# Patient Record
Sex: MALE | Race: WHITE | NOT HISPANIC OR LATINO
[De-identification: names, ages, dates, MRNs, and addresses within clinical notes are randomized per-mention and may not be internally consistent; named-entity substitution may affect disease eponyms.]

---

## 2017-03-07 ENCOUNTER — APPOINTMENT (OUTPATIENT)
Dept: INTERNAL MEDICINE | Facility: CLINIC | Age: 67
End: 2017-03-07

## 2017-03-15 ENCOUNTER — APPOINTMENT (OUTPATIENT)
Dept: INTERNAL MEDICINE | Facility: CLINIC | Age: 67
End: 2017-03-15

## 2017-03-15 ENCOUNTER — LABORATORY RESULT (OUTPATIENT)
Age: 67
End: 2017-03-15

## 2017-03-15 VITALS
WEIGHT: 170 LBS | DIASTOLIC BLOOD PRESSURE: 80 MMHG | SYSTOLIC BLOOD PRESSURE: 130 MMHG | RESPIRATION RATE: 14 BRPM | BODY MASS INDEX: 27.32 KG/M2 | OXYGEN SATURATION: 95 % | HEART RATE: 80 BPM | HEIGHT: 66 IN | TEMPERATURE: 98.5 F

## 2017-03-15 DIAGNOSIS — M54.2 CERVICALGIA: ICD-10-CM

## 2017-03-15 DIAGNOSIS — I26.99 OTHER PULMONARY EMBOLISM W/OUT ACUTE COR PULMONALE: ICD-10-CM

## 2017-03-16 LAB
ALBUMIN SERPL ELPH-MCNC: 4.1 G/DL
ALP BLD-CCNC: 30 U/L
ALT SERPL-CCNC: 29 U/L
ANION GAP SERPL CALC-SCNC: 17 MMOL/L
AST SERPL-CCNC: 17 U/L
BASOPHILS # BLD AUTO: 0 K/UL
BASOPHILS NFR BLD AUTO: 0 %
BILIRUB SERPL-MCNC: 0.4 MG/DL
BUN SERPL-MCNC: 25 MG/DL
CALCIUM SERPL-MCNC: 8.9 MG/DL
CHLORIDE SERPL-SCNC: 98 MMOL/L
CHOLEST SERPL-MCNC: 327 MG/DL
CHOLEST/HDLC SERPL: 4.1 RATIO
CO2 SERPL-SCNC: 24 MMOL/L
CREAT SERPL-MCNC: 0.71 MG/DL
EOSINOPHIL # BLD AUTO: 0 K/UL
EOSINOPHIL NFR BLD AUTO: 0 %
GLUCOSE SERPL-MCNC: 80 MG/DL
HBA1C MFR BLD HPLC: 6.4 %
HCT VFR BLD CALC: 49.9 %
HDLC SERPL-MCNC: 79 MG/DL
HGB BLD-MCNC: 16.1 G/DL
LDLC SERPL CALC-MCNC: 205 MG/DL
LYMPHOCYTES # BLD AUTO: 3.46 K/UL
LYMPHOCYTES NFR BLD AUTO: 22.7 %
MAN DIFF?: NORMAL
MCHC RBC-ENTMCNC: 31.6 PG
MCHC RBC-ENTMCNC: 32.3 GM/DL
MCV RBC AUTO: 97.8 FL
MONOCYTES # BLD AUTO: 2.91 K/UL
MONOCYTES NFR BLD AUTO: 19.1 %
NEUTROPHILS # BLD AUTO: 8.18 K/UL
NEUTROPHILS NFR BLD AUTO: 53.7 %
PLATELET # BLD AUTO: 205 K/UL
POTASSIUM SERPL-SCNC: 3.6 MMOL/L
PROT SERPL-MCNC: 6.2 G/DL
PSA FREE FLD-MCNC: 6.3 %
PSA FREE SERPL-MCNC: 2.81 NG/ML
PSA SERPL-MCNC: 44.38 NG/ML
RBC # BLD: 5.1 M/UL
RBC # FLD: 14.3 %
SODIUM SERPL-SCNC: 139 MMOL/L
TRIGL SERPL-MCNC: 214 MG/DL
WBC # FLD AUTO: 15.23 K/UL

## 2017-04-26 ENCOUNTER — APPOINTMENT (OUTPATIENT)
Dept: UROLOGY | Facility: CLINIC | Age: 67
End: 2017-04-26

## 2017-05-15 ENCOUNTER — APPOINTMENT (OUTPATIENT)
Dept: INTERNAL MEDICINE | Facility: CLINIC | Age: 67
End: 2017-05-15

## 2017-05-19 ENCOUNTER — EMERGENCY (EMERGENCY)
Facility: HOSPITAL | Age: 67
LOS: 1 days | Discharge: HOME | End: 2017-05-19
Attending: INTERNAL MEDICINE | Admitting: INTERNAL MEDICINE
Payer: MEDICARE

## 2017-05-19 VITALS
HEART RATE: 114 BPM | RESPIRATION RATE: 16 BRPM | HEIGHT: 66 IN | DIASTOLIC BLOOD PRESSURE: 82 MMHG | OXYGEN SATURATION: 96 % | SYSTOLIC BLOOD PRESSURE: 122 MMHG | TEMPERATURE: 99 F | WEIGHT: 169.98 LBS

## 2017-05-19 VITALS
DIASTOLIC BLOOD PRESSURE: 80 MMHG | HEART RATE: 106 BPM | SYSTOLIC BLOOD PRESSURE: 125 MMHG | OXYGEN SATURATION: 97 % | TEMPERATURE: 99 F | RESPIRATION RATE: 16 BRPM

## 2017-05-19 PROCEDURE — 99282 EMERGENCY DEPT VISIT SF MDM: CPT

## 2017-05-19 PROCEDURE — 99284 EMERGENCY DEPT VISIT MOD MDM: CPT

## 2017-05-19 NOTE — ED PROVIDER NOTE - PHYSICAL EXAMINATION
left lower medial buttock area with slightly tender and erythematous,non fluctuant site...incision and drainage done but absolutely no pus noted,only bleeding from incision via #11 blade.packed to control bleeding.

## 2017-05-19 NOTE — ED PROVIDER NOTE - OBJECTIVE STATEMENT
66 yo wm,very poor historian,c/o 1 week of pain ? left buttock. feels this is similar to instance in past.denies fever or dm.wants "drain".doesnt know if he has had mrsa in past instance.allergic to pcn and sulfa.

## 2017-05-19 NOTE — ED ADULT NURSE NOTE - OBJECTIVE STATEMENT
Patient walked into ER c/o pain to left buttock for a week.  area very red looking and painful to touch.

## 2017-09-05 ENCOUNTER — EMERGENCY (EMERGENCY)
Facility: HOSPITAL | Age: 67
LOS: 1 days | Discharge: HOME | End: 2017-09-05
Attending: INTERNAL MEDICINE | Admitting: INTERNAL MEDICINE
Payer: MEDICARE

## 2017-09-05 VITALS
DIASTOLIC BLOOD PRESSURE: 78 MMHG | OXYGEN SATURATION: 98 % | SYSTOLIC BLOOD PRESSURE: 133 MMHG | WEIGHT: 160.06 LBS | HEART RATE: 109 BPM | TEMPERATURE: 98 F | HEIGHT: 66 IN

## 2017-09-05 VITALS
RESPIRATION RATE: 16 BRPM | OXYGEN SATURATION: 98 % | DIASTOLIC BLOOD PRESSURE: 76 MMHG | HEART RATE: 92 BPM | SYSTOLIC BLOOD PRESSURE: 130 MMHG

## 2017-09-05 PROCEDURE — 10060 I&D ABSCESS SIMPLE/SINGLE: CPT

## 2017-09-05 PROCEDURE — 99283 EMERGENCY DEPT VISIT LOW MDM: CPT | Mod: 25

## 2017-09-05 NOTE — ED PROVIDER NOTE - OBJECTIVE STATEMENT
68 y/o M pt presents to ED c/o abscess and  infection to  right lower back. He had previous abscess to same area which required  I&D. no hx of diabetes. no fevers, no chills. no further complaints at this time. 68 y/o M pt presents to ED c/o  infection to right lower back. He had previous abscess but not near this area which required  I&D. no hx of diabetes. no fevers, no chills. no further complaints at this time.

## 2017-09-05 NOTE — ED ADULT NURSE NOTE - OBJECTIVE STATEMENT
Amb to ED. Pt c/o rt lower back tenderness for past 4 days with area of redness that is warm to the touch.

## 2017-09-05 NOTE — ED PROVIDER NOTE - MEDICAL DECISION MAKING DETAILS
5x2 inch abscess right lower back above buttock...incised and drained.. copious amount of pus..will return for packing check tomorrow.pt didn't want to be on ab and will reconsider when he has wound check with me tomorrow

## 2017-09-05 NOTE — ED PROVIDER NOTE - NS_ ATTENDINGSCRIBEDETAILS _ED_A_ED_FT
Anam Lim MD - The scribe's documentation has been prepared under my direction and personally reviewed by me in its entirety. I confirm that the note above accurately reflects all work, treatment, procedures, and medical decision making performed by me.

## 2017-09-06 ENCOUNTER — EMERGENCY (EMERGENCY)
Facility: HOSPITAL | Age: 67
LOS: 1 days | End: 2017-09-06
Attending: INTERNAL MEDICINE | Admitting: INTERNAL MEDICINE
Payer: MEDICARE

## 2017-09-06 VITALS
SYSTOLIC BLOOD PRESSURE: 121 MMHG | HEIGHT: 66 IN | OXYGEN SATURATION: 97 % | RESPIRATION RATE: 16 BRPM | TEMPERATURE: 98 F | HEART RATE: 88 BPM | DIASTOLIC BLOOD PRESSURE: 74 MMHG | WEIGHT: 160.06 LBS

## 2017-09-06 PROCEDURE — G0463: CPT

## 2017-09-06 NOTE — ED PROVIDER NOTE - PHYSICAL EXAMINATION
wound has mild amount of serous material only. induration and erythema is much less. wound has mild amount of serous material only draining from it. induration and erythema are much less.

## 2017-09-06 NOTE — ED PROVIDER NOTE - MEDICAL DECISION MAKING DETAILS
abscess I and d yesterday...wound much improved today..no more pus..no need to repack.can f/u with pmd as nec.

## 2017-09-06 NOTE — ED ADULT NURSE NOTE - OBJECTIVE STATEMENT
pt for wound check s/p i&d of abcess yesterday wound to right flank no d/c now area looks good no warmth or erythema no pains

## 2017-09-06 NOTE — ED PROVIDER NOTE - OBJECTIVE STATEMENT
66 y/o M pt with history of HTN who was seen in the ED yesterday for an abscess on his right lower back as it's approaching the buttock that was incised and drained (300cc of pus) and packed. Packing placed yesterday was accidentally removed by the pt. Pt was told to come back to the ED today for a wound check. Denies fever, pain, vomiting, diarrhea. No further complaints at this time. 66 y/o M pt with history of HTN who was seen in the ED yesterday for an abscess on his right lower back  that was incised and drained of copious pus and packed. Packing placed yesterday was accidentally removed by the pt. Pt was told to come back to the ED today for a wound check. Denies fever, pain, vomiting, diarrhea. No further complaints at this time.

## 2017-09-07 LAB
-  AMPICILLIN/SULBACTAM: SIGNIFICANT CHANGE UP
-  CEFAZOLIN: SIGNIFICANT CHANGE UP
-  CIPROFLOXACIN: SIGNIFICANT CHANGE UP
-  CLINDAMYCIN: SIGNIFICANT CHANGE UP
-  ERYTHROMYCIN: SIGNIFICANT CHANGE UP
-  GENTAMICIN: SIGNIFICANT CHANGE UP
-  LEVOFLOXACIN: SIGNIFICANT CHANGE UP
-  MOXIFLOXACIN(AEROBIC): SIGNIFICANT CHANGE UP
-  OXACILLIN: SIGNIFICANT CHANGE UP
-  PENICILLIN: SIGNIFICANT CHANGE UP
-  RIFAMPIN: SIGNIFICANT CHANGE UP
-  TETRACYCLINE: SIGNIFICANT CHANGE UP
-  TRIMETHOPRIM/SULFAMETHOXAZOLE: SIGNIFICANT CHANGE UP
-  VANCOMYCIN: SIGNIFICANT CHANGE UP
CULTURE RESULTS: SIGNIFICANT CHANGE UP
METHOD TYPE: SIGNIFICANT CHANGE UP
ORGANISM # SPEC MICROSCOPIC CNT: SIGNIFICANT CHANGE UP
ORGANISM # SPEC MICROSCOPIC CNT: SIGNIFICANT CHANGE UP
SPECIMEN SOURCE: SIGNIFICANT CHANGE UP

## 2018-01-29 ENCOUNTER — APPOINTMENT (OUTPATIENT)
Dept: INTERNAL MEDICINE | Facility: CLINIC | Age: 68
End: 2018-01-29
Payer: MEDICARE

## 2018-01-29 VITALS
RESPIRATION RATE: 14 BRPM | TEMPERATURE: 97.5 F | HEART RATE: 87 BPM | DIASTOLIC BLOOD PRESSURE: 80 MMHG | WEIGHT: 177 LBS | HEIGHT: 66 IN | OXYGEN SATURATION: 98 % | BODY MASS INDEX: 28.45 KG/M2 | SYSTOLIC BLOOD PRESSURE: 140 MMHG

## 2018-01-29 PROCEDURE — 99214 OFFICE O/P EST MOD 30 MIN: CPT

## 2018-01-29 RX ORDER — RIVAROXABAN 20 MG/1
20 TABLET, FILM COATED ORAL
Qty: 30 | Refills: 2 | Status: DISCONTINUED | COMMUNITY
Start: 2017-03-15 | End: 2018-01-29

## 2018-01-30 LAB
25(OH)D3 SERPL-MCNC: 29.6 NG/ML
ALBUMIN SERPL ELPH-MCNC: 4.1 G/DL
ALP BLD-CCNC: 38 U/L
ALT SERPL-CCNC: 23 U/L
ANION GAP SERPL CALC-SCNC: 12 MMOL/L
APPEARANCE: CLEAR
AST SERPL-CCNC: 19 U/L
BASOPHILS # BLD AUTO: 0.01 K/UL
BASOPHILS NFR BLD AUTO: 0.1 %
BILIRUB SERPL-MCNC: 0.4 MG/DL
BILIRUBIN URINE: NEGATIVE
BLOOD URINE: NEGATIVE
BUN SERPL-MCNC: 33 MG/DL
CALCIUM SERPL-MCNC: 8.5 MG/DL
CHLORIDE SERPL-SCNC: 104 MMOL/L
CHOLEST SERPL-MCNC: 301 MG/DL
CHOLEST/HDLC SERPL: 3.9 RATIO
CO2 SERPL-SCNC: 25 MMOL/L
COLOR: YELLOW
CREAT SERPL-MCNC: 0.72 MG/DL
EOSINOPHIL # BLD AUTO: 0.04 K/UL
EOSINOPHIL NFR BLD AUTO: 0.3 %
FOLATE SERPL-MCNC: 14.7 NG/ML
GLUCOSE QUALITATIVE U: NEGATIVE MG/DL
GLUCOSE SERPL-MCNC: 120 MG/DL
HBA1C MFR BLD HPLC: 6 %
HCT VFR BLD CALC: 47.7 %
HDLC SERPL-MCNC: 78 MG/DL
HGB BLD-MCNC: 15.3 G/DL
IMM GRANULOCYTES NFR BLD AUTO: 0.9 %
KETONES URINE: NEGATIVE
LDLC SERPL CALC-MCNC: 211 MG/DL
LEUKOCYTE ESTERASE URINE: NEGATIVE
LYMPHOCYTES # BLD AUTO: 0.82 K/UL
LYMPHOCYTES NFR BLD AUTO: 7 %
MAN DIFF?: NORMAL
MCHC RBC-ENTMCNC: 31.1 PG
MCHC RBC-ENTMCNC: 32.1 GM/DL
MCV RBC AUTO: 97 FL
MONOCYTES # BLD AUTO: 0.1 K/UL
MONOCYTES NFR BLD AUTO: 0.9 %
NEUTROPHILS # BLD AUTO: 10.62 K/UL
NEUTROPHILS NFR BLD AUTO: 90.8 %
NITRITE URINE: NEGATIVE
PH URINE: 5.5
PLATELET # BLD AUTO: 171 K/UL
POTASSIUM SERPL-SCNC: 4.5 MMOL/L
PROT SERPL-MCNC: 6.9 G/DL
PROTEIN URINE: NEGATIVE MG/DL
PSA SERPL-MCNC: 61.97 NG/ML
RBC # BLD: 4.92 M/UL
RBC # FLD: 15.2 %
SODIUM SERPL-SCNC: 141 MMOL/L
SPECIFIC GRAVITY URINE: 1.03
TRIGL SERPL-MCNC: 61 MG/DL
TSH SERPL-ACNC: 2.61 UIU/ML
URATE SERPL-MCNC: 5.6 MG/DL
UROBILINOGEN URINE: NEGATIVE MG/DL
VIT B12 SERPL-MCNC: 402 PG/ML
WBC # FLD AUTO: 11.7 K/UL

## 2018-03-30 ENCOUNTER — RX RENEWAL (OUTPATIENT)
Age: 68
End: 2018-03-30

## 2018-04-20 ENCOUNTER — RX RENEWAL (OUTPATIENT)
Age: 68
End: 2018-04-20

## 2018-07-03 ENCOUNTER — MEDICATION RENEWAL (OUTPATIENT)
Age: 68
End: 2018-07-03

## 2018-07-16 ENCOUNTER — APPOINTMENT (OUTPATIENT)
Dept: INTERNAL MEDICINE | Facility: CLINIC | Age: 68
End: 2018-07-16
Payer: MEDICARE

## 2018-07-16 ENCOUNTER — LABORATORY RESULT (OUTPATIENT)
Age: 68
End: 2018-07-16

## 2018-07-16 VITALS
HEART RATE: 92 BPM | TEMPERATURE: 98.6 F | HEIGHT: 66 IN | RESPIRATION RATE: 14 BRPM | SYSTOLIC BLOOD PRESSURE: 140 MMHG | DIASTOLIC BLOOD PRESSURE: 92 MMHG | BODY MASS INDEX: 28.45 KG/M2 | WEIGHT: 177 LBS | OXYGEN SATURATION: 98 %

## 2018-07-16 VITALS — SYSTOLIC BLOOD PRESSURE: 152 MMHG | DIASTOLIC BLOOD PRESSURE: 100 MMHG

## 2018-07-16 DIAGNOSIS — I82.409 ACUTE EMBOLISM AND THROMBOSIS OF UNSPECIFIED DEEP VEINS OF UNSPECIFIED LOWER EXTREMITY: ICD-10-CM

## 2018-07-16 PROBLEM — I10 ESSENTIAL (PRIMARY) HYPERTENSION: Chronic | Status: ACTIVE | Noted: 2017-09-05

## 2018-07-16 LAB — GLUCOSE BLDC GLUCOMTR-MCNC: 104

## 2018-07-16 PROCEDURE — 82962 GLUCOSE BLOOD TEST: CPT

## 2018-07-16 PROCEDURE — 99214 OFFICE O/P EST MOD 30 MIN: CPT | Mod: 25

## 2018-07-16 NOTE — PHYSICAL EXAM
[No Acute Distress] : no acute distress [Well Nourished] : well nourished [Well Developed] : well developed [Normal Outer Ear/Nose] : the outer ears and nose were normal in appearance [Normal Oropharynx] : the oropharynx was normal [Normal TMs] : both tympanic membranes were normal [Supple] : supple [No Lymphadenopathy] : no lymphadenopathy [No Respiratory Distress] : no respiratory distress  [Clear to Auscultation] : lungs were clear to auscultation bilaterally [No Accessory Muscle Use] : no accessory muscle use [Normal Rate] : normal rate  [Regular Rhythm] : with a regular rhythm [Normal S1, S2] : normal S1 and S2 [Soft] : abdomen soft [Non Tender] : non-tender

## 2018-07-16 NOTE — HISTORY OF PRESENT ILLNESS
[Congestion] : congestion [FreeTextEntry8] : c/o ears feeling clogged today. Has had allergy, runny nose. No cough or s/t.\par \par He has h/o DVT right leg and chronic edema now. Denies pain. \par \par His BP is good at home 120-130/. He doesn't feel good today and nervous in office.

## 2018-07-17 LAB
25(OH)D3 SERPL-MCNC: 41.4 NG/ML
ALBUMIN SERPL ELPH-MCNC: 4 G/DL
ALP BLD-CCNC: 31 U/L
ALT SERPL-CCNC: 19 U/L
ANION GAP SERPL CALC-SCNC: 12 MMOL/L
AST SERPL-CCNC: 19 U/L
BASOPHILS # BLD AUTO: 0.03 K/UL
BASOPHILS NFR BLD AUTO: 0.3 %
BILIRUB SERPL-MCNC: 0.6 MG/DL
BUN SERPL-MCNC: 15 MG/DL
CALCIUM SERPL-MCNC: 8.6 MG/DL
CHLORIDE SERPL-SCNC: 98 MMOL/L
CHOLEST SERPL-MCNC: 259 MG/DL
CHOLEST/HDLC SERPL: 3.7 RATIO
CO2 SERPL-SCNC: 22 MMOL/L
CREAT SERPL-MCNC: 0.72 MG/DL
EOSINOPHIL # BLD AUTO: 0.11 K/UL
EOSINOPHIL NFR BLD AUTO: 1.2 %
FOLATE SERPL-MCNC: 11 NG/ML
GLUCOSE SERPL-MCNC: 105 MG/DL
HBA1C MFR BLD HPLC: 6 %
HCT VFR BLD CALC: 44.6 %
HDLC SERPL-MCNC: 70 MG/DL
HGB BLD-MCNC: 15.2 G/DL
IMM GRANULOCYTES NFR BLD AUTO: 1.2 %
LDLC SERPL CALC-MCNC: 170 MG/DL
LYMPHOCYTES # BLD AUTO: 2.03 K/UL
LYMPHOCYTES NFR BLD AUTO: 21.8 %
MAGNESIUM SERPL-MCNC: 1.8 MG/DL
MAN DIFF?: NORMAL
MCHC RBC-ENTMCNC: 32 PG
MCHC RBC-ENTMCNC: 34.1 GM/DL
MCV RBC AUTO: 93.9 FL
MONOCYTES # BLD AUTO: 0.86 K/UL
MONOCYTES NFR BLD AUTO: 9.2 %
NEUTROPHILS # BLD AUTO: 6.16 K/UL
NEUTROPHILS NFR BLD AUTO: 66.3 %
PLATELET # BLD AUTO: 162 K/UL
POTASSIUM SERPL-SCNC: 3.9 MMOL/L
PROT SERPL-MCNC: 5.9 G/DL
PSA SERPL-MCNC: 63.5 NG/ML
RBC # BLD: 4.75 M/UL
RBC # FLD: 14.2 %
SODIUM SERPL-SCNC: 132 MMOL/L
THYROGLOB AB SERPL-ACNC: <20 IU/ML
THYROPEROXIDASE AB SERPL IA-ACNC: <10 IU/ML
TRIGL SERPL-MCNC: 93 MG/DL
TSH SERPL-ACNC: 4.3 UIU/ML
URATE SERPL-MCNC: 4.3 MG/DL
VIT B12 SERPL-MCNC: 423 PG/ML
WBC # FLD AUTO: 9.3 K/UL

## 2018-07-18 ENCOUNTER — APPOINTMENT (OUTPATIENT)
Dept: INTERNAL MEDICINE | Facility: CLINIC | Age: 68
End: 2018-07-18

## 2018-08-27 ENCOUNTER — MEDICATION RENEWAL (OUTPATIENT)
Age: 68
End: 2018-08-27

## 2018-08-27 ENCOUNTER — APPOINTMENT (OUTPATIENT)
Dept: INTERNAL MEDICINE | Facility: CLINIC | Age: 68
End: 2018-08-27
Payer: MEDICARE

## 2018-08-27 VITALS
HEART RATE: 96 BPM | OXYGEN SATURATION: 98 % | BODY MASS INDEX: 28.61 KG/M2 | SYSTOLIC BLOOD PRESSURE: 130 MMHG | DIASTOLIC BLOOD PRESSURE: 80 MMHG | WEIGHT: 178 LBS | RESPIRATION RATE: 14 BRPM | TEMPERATURE: 98 F | HEIGHT: 66 IN

## 2018-08-27 DIAGNOSIS — M26.609 UNSPECIFIED TEMPOROMANDIBULAR JOINT DISORDER: ICD-10-CM

## 2018-08-27 PROCEDURE — 99214 OFFICE O/P EST MOD 30 MIN: CPT

## 2018-08-27 NOTE — PHYSICAL EXAM

## 2018-10-10 ENCOUNTER — APPOINTMENT (OUTPATIENT)
Dept: INTERNAL MEDICINE | Facility: CLINIC | Age: 68
End: 2018-10-10
Payer: MEDICARE

## 2018-10-10 ENCOUNTER — LABORATORY RESULT (OUTPATIENT)
Age: 68
End: 2018-10-10

## 2018-10-10 VITALS
BODY MASS INDEX: 25.71 KG/M2 | TEMPERATURE: 97.7 F | OXYGEN SATURATION: 98 % | SYSTOLIC BLOOD PRESSURE: 160 MMHG | HEART RATE: 106 BPM | RESPIRATION RATE: 14 BRPM | WEIGHT: 160 LBS | HEIGHT: 66 IN | DIASTOLIC BLOOD PRESSURE: 90 MMHG

## 2018-10-10 PROCEDURE — G0439: CPT

## 2018-10-10 PROCEDURE — 96372 THER/PROPH/DIAG INJ SC/IM: CPT

## 2018-10-10 RX ORDER — CYANOCOBALAMIN 1000 UG/ML
1000 INJECTION INTRAMUSCULAR; SUBCUTANEOUS
Qty: 0 | Refills: 0 | Status: COMPLETED | OUTPATIENT
Start: 2018-10-10

## 2018-10-10 RX ADMIN — CYANOCOBALAMIN 0 MCG/ML: 1000 INJECTION INTRAMUSCULAR; SUBCUTANEOUS at 00:00

## 2018-10-10 NOTE — HEALTH RISK ASSESSMENT
[] : No [No falls in past year] : Patient reported no falls in the past year [0] : 2) Feeling down, depressed, or hopeless: Not at all (0) [None] : None [] :  [Feels Safe at Home] : Feels safe at home [Fully functional (bathing, dressing, toileting, transferring, walking, feeding)] : Fully functional (bathing, dressing, toileting, transferring, walking, feeding) [Fully functional (using the telephone, shopping, preparing meals, housekeeping, doing laundry, using] : Fully functional and needs no help or supervision to perform IADLs (using the telephone, shopping, preparing meals, housekeeping, doing laundry, using transportation, managing medications and managing finances) [Reports changes in hearing] : Reports no changes in hearing [Reports changes in vision] : Reports no changes in vision [Reports changes in dental health] : Reports no changes in dental health

## 2018-10-10 NOTE — PHYSICAL EXAM
[No Acute Distress] : no acute distress [Well Nourished] : well nourished [Well Developed] : well developed [Well-Appearing] : well-appearing [Normal Sclera/Conjunctiva] : normal sclera/conjunctiva [PERRL] : pupils equal round and reactive to light [Normal Outer Ear/Nose] : the outer ears and nose were normal in appearance [Normal Oropharynx] : the oropharynx was normal [Normal TMs] : both tympanic membranes were normal [No JVD] : no jugular venous distention [Supple] : supple [No Lymphadenopathy] : no lymphadenopathy [Thyroid Normal, No Nodules] : the thyroid was normal and there were no nodules present [No Respiratory Distress] : no respiratory distress  [Clear to Auscultation] : lungs were clear to auscultation bilaterally [No Accessory Muscle Use] : no accessory muscle use [Normal Rate] : normal rate  [Regular Rhythm] : with a regular rhythm [Normal S1, S2] : normal S1 and S2 [No Murmur] : no murmur heard [No Edema] : there was no peripheral edema [Soft] : abdomen soft [Non Tender] : non-tender [Normal Sphincter Tone] : normal sphincter tone [Prostate Tenderness] : the prostate was not tender [No Joint Swelling] : no joint swelling [Grossly Normal Strength/Tone] : grossly normal strength/tone [Normal Gait] : normal gait [Coordination Grossly Intact] : coordination grossly intact [No Focal Deficits] : no focal deficits [Deep Tendon Reflexes (DTR)] : deep tendon reflexes were 2+ and symmetric [Normal Affect] : the affect was normal [Normal Insight/Judgement] : insight and judgment were intact [FreeTextEntry1] : external hemorrhoids - no bleeding noted

## 2018-10-11 LAB
ALBUMIN SERPL ELPH-MCNC: 3.4 G/DL
ALP BLD-CCNC: 63 U/L
ALT SERPL-CCNC: 19 U/L
ANION GAP SERPL CALC-SCNC: 13 MMOL/L
AST SERPL-CCNC: 19 U/L
BASOPHILS # BLD AUTO: 0.02 K/UL
BASOPHILS NFR BLD AUTO: 0.1 %
BILIRUB SERPL-MCNC: 0.6 MG/DL
BUN SERPL-MCNC: 23 MG/DL
CALCIUM SERPL-MCNC: 8.5 MG/DL
CHLORIDE SERPL-SCNC: 101 MMOL/L
CHOLEST SERPL-MCNC: 279 MG/DL
CHOLEST/HDLC SERPL: 3.8 RATIO
CO2 SERPL-SCNC: 24 MMOL/L
CREAT SERPL-MCNC: 0.9 MG/DL
EOSINOPHIL # BLD AUTO: 0.11 K/UL
EOSINOPHIL NFR BLD AUTO: 0.8 %
FOLATE SERPL-MCNC: 10.9 NG/ML
GLUCOSE SERPL-MCNC: 81 MG/DL
HBA1C MFR BLD HPLC: 6.2 %
HCT VFR BLD CALC: 47.9 %
HDLC SERPL-MCNC: 73 MG/DL
HGB BLD-MCNC: 15.5 G/DL
IMM GRANULOCYTES NFR BLD AUTO: 2.1 %
LDLC SERPL CALC-MCNC: 174 MG/DL
LYMPHOCYTES # BLD AUTO: 3 K/UL
LYMPHOCYTES NFR BLD AUTO: 21.3 %
MAN DIFF?: NORMAL
MCHC RBC-ENTMCNC: 31.7 PG
MCHC RBC-ENTMCNC: 32.4 GM/DL
MCV RBC AUTO: 98 FL
MONOCYTES # BLD AUTO: 1.3 K/UL
MONOCYTES NFR BLD AUTO: 9.2 %
NEUTROPHILS # BLD AUTO: 9.33 K/UL
NEUTROPHILS NFR BLD AUTO: 66.5 %
PLATELET # BLD AUTO: 218 K/UL
POTASSIUM SERPL-SCNC: 3.8 MMOL/L
PROT SERPL-MCNC: 6 G/DL
PSA SERPL-MCNC: 75.45 NG/ML
RBC # BLD: 4.89 M/UL
RBC # FLD: 14.3 %
SODIUM SERPL-SCNC: 138 MMOL/L
TRIGL SERPL-MCNC: 159 MG/DL
TSH SERPL-ACNC: 6.44 UIU/ML
VIT B12 SERPL-MCNC: 412 PG/ML
WBC # FLD AUTO: 14.06 K/UL

## 2018-10-15 ENCOUNTER — EMERGENCY (EMERGENCY)
Facility: HOSPITAL | Age: 68
LOS: 1 days | Discharge: HOME | End: 2018-10-15
Attending: EMERGENCY MEDICINE | Admitting: EMERGENCY MEDICINE
Payer: MEDICARE

## 2018-10-15 VITALS
WEIGHT: 160.06 LBS | HEART RATE: 89 BPM | SYSTOLIC BLOOD PRESSURE: 144 MMHG | OXYGEN SATURATION: 98 % | TEMPERATURE: 98 F | RESPIRATION RATE: 16 BRPM | DIASTOLIC BLOOD PRESSURE: 89 MMHG | HEIGHT: 66 IN

## 2018-10-15 VITALS
SYSTOLIC BLOOD PRESSURE: 138 MMHG | HEART RATE: 82 BPM | RESPIRATION RATE: 15 BRPM | OXYGEN SATURATION: 99 % | DIASTOLIC BLOOD PRESSURE: 75 MMHG

## 2018-10-15 DIAGNOSIS — K62.5 HEMORRHAGE OF ANUS AND RECTUM: ICD-10-CM

## 2018-10-15 LAB
ALBUMIN SERPL ELPH-MCNC: 3.1 G/DL — LOW (ref 3.3–5)
ALP SERPL-CCNC: 66 U/L — SIGNIFICANT CHANGE UP (ref 30–120)
ALT FLD-CCNC: 27 U/L DA — SIGNIFICANT CHANGE UP (ref 10–60)
ANION GAP SERPL CALC-SCNC: 8 MMOL/L — SIGNIFICANT CHANGE UP (ref 5–17)
APTT BLD: 28.8 SEC — SIGNIFICANT CHANGE UP (ref 27.5–37.4)
AST SERPL-CCNC: 16 U/L — SIGNIFICANT CHANGE UP (ref 10–40)
BASOPHILS # BLD AUTO: 0 K/UL — SIGNIFICANT CHANGE UP (ref 0–0.2)
BASOPHILS NFR BLD AUTO: 0 % — SIGNIFICANT CHANGE UP (ref 0–2)
BILIRUB SERPL-MCNC: 0.6 MG/DL — SIGNIFICANT CHANGE UP (ref 0.2–1.2)
BUN SERPL-MCNC: 23 MG/DL — SIGNIFICANT CHANGE UP (ref 7–23)
CALCIUM SERPL-MCNC: 8.5 MG/DL — SIGNIFICANT CHANGE UP (ref 8.4–10.5)
CHLORIDE SERPL-SCNC: 102 MMOL/L — SIGNIFICANT CHANGE UP (ref 96–108)
CO2 SERPL-SCNC: 26 MMOL/L — SIGNIFICANT CHANGE UP (ref 22–31)
CREAT SERPL-MCNC: 0.77 MG/DL — SIGNIFICANT CHANGE UP (ref 0.5–1.3)
EOSINOPHIL # BLD AUTO: 0 K/UL — SIGNIFICANT CHANGE UP (ref 0–0.5)
EOSINOPHIL NFR BLD AUTO: 0 % — SIGNIFICANT CHANGE UP (ref 0–6)
GLUCOSE SERPL-MCNC: 125 MG/DL — HIGH (ref 70–99)
HCT VFR BLD CALC: 44.4 % — SIGNIFICANT CHANGE UP (ref 39–50)
HGB BLD-MCNC: 14.8 G/DL — SIGNIFICANT CHANGE UP (ref 13–17)
INR BLD: 1.05 RATIO — SIGNIFICANT CHANGE UP (ref 0.88–1.16)
LYMPHOCYTES # BLD AUTO: 0.46 K/UL — LOW (ref 1–3.3)
LYMPHOCYTES # BLD AUTO: 4 % — LOW (ref 13–44)
MCHC RBC-ENTMCNC: 31.6 PG — SIGNIFICANT CHANGE UP (ref 27–34)
MCHC RBC-ENTMCNC: 33.3 GM/DL — SIGNIFICANT CHANGE UP (ref 32–36)
MCV RBC AUTO: 94.9 FL — SIGNIFICANT CHANGE UP (ref 80–100)
METAMYELOCYTES # FLD: 2 % — HIGH (ref 0–0)
MONOCYTES # BLD AUTO: 0.11 K/UL — SIGNIFICANT CHANGE UP (ref 0–0.9)
MONOCYTES NFR BLD AUTO: 1 % — LOW (ref 2–14)
NEUTROPHILS # BLD AUTO: 10.69 K/UL — HIGH (ref 1.8–7.4)
NEUTROPHILS NFR BLD AUTO: 92 % — HIGH (ref 43–77)
NEUTS BAND # BLD: 1 % — SIGNIFICANT CHANGE UP (ref 0–8)
NRBC # BLD: 0 — SIGNIFICANT CHANGE UP
NRBC # BLD: SIGNIFICANT CHANGE UP /100 WBCS (ref 0–0)
OB PNL STL: POSITIVE
PLAT MORPH BLD: NORMAL — SIGNIFICANT CHANGE UP
PLATELET # BLD AUTO: 190 K/UL — SIGNIFICANT CHANGE UP (ref 150–400)
POTASSIUM SERPL-MCNC: 4.3 MMOL/L — SIGNIFICANT CHANGE UP (ref 3.5–5.3)
POTASSIUM SERPL-SCNC: 4.3 MMOL/L — SIGNIFICANT CHANGE UP (ref 3.5–5.3)
PROT SERPL-MCNC: 6.2 G/DL — SIGNIFICANT CHANGE UP (ref 6–8.3)
PROTHROM AB SERPL-ACNC: 11.5 SEC — SIGNIFICANT CHANGE UP (ref 9.8–12.7)
RBC # BLD: 4.68 M/UL — SIGNIFICANT CHANGE UP (ref 4.2–5.8)
RBC # FLD: 13.4 % — SIGNIFICANT CHANGE UP (ref 10.3–14.5)
RBC BLD AUTO: NORMAL — SIGNIFICANT CHANGE UP
SODIUM SERPL-SCNC: 136 MMOL/L — SIGNIFICANT CHANGE UP (ref 135–145)
WBC # BLD: 11.49 K/UL — HIGH (ref 3.8–10.5)
WBC # FLD AUTO: 11.49 K/UL — HIGH (ref 3.8–10.5)

## 2018-10-15 PROCEDURE — 99284 EMERGENCY DEPT VISIT MOD MDM: CPT

## 2018-10-15 PROCEDURE — 85610 PROTHROMBIN TIME: CPT

## 2018-10-15 PROCEDURE — 80053 COMPREHEN METABOLIC PANEL: CPT

## 2018-10-15 PROCEDURE — 85730 THROMBOPLASTIN TIME PARTIAL: CPT

## 2018-10-15 PROCEDURE — 82272 OCCULT BLD FECES 1-3 TESTS: CPT

## 2018-10-15 PROCEDURE — 36415 COLL VENOUS BLD VENIPUNCTURE: CPT

## 2018-10-15 PROCEDURE — 85027 COMPLETE CBC AUTOMATED: CPT

## 2018-10-15 RX ORDER — HYDROCORTISONE 1 %
1 OINTMENT (GRAM) TOPICAL
Qty: 7 | Refills: 0
Start: 2018-10-15 | End: 2018-10-21

## 2018-10-15 RX ORDER — LISINOPRIL 2.5 MG/1
1 TABLET ORAL
Qty: 0 | Refills: 0 | COMMUNITY

## 2018-10-15 NOTE — ED PROVIDER NOTE - NONTENDER LOCATION
periumbilical/left costovertebral angle/left upper quadrant/right upper quadrant/left lower quadrant/right lower quadrant/right costovertebral angle

## 2018-10-15 NOTE — ED PROVIDER NOTE - OBJECTIVE STATEMENT
67 y/o M pt with hx of HTN and hemorrhoids presents to the ED c/o intermittent rectal bleeding with bowel movements for 4 days. Pt states that he noticed blood in the toilet bowl and mixed with the stool. He has not had a colonoscopy recently. Denies abd pain, diarrhea, constipation, nausea, vomiting, fever, or chills. No other complaints at this time.     Dr. Jewell- du

## 2018-10-15 NOTE — ED PROVIDER NOTE - MEDICAL DECISION MAKING DETAILS
69 y/o M pt c/o intermittent rectal bleeding with bowel movements for a couple of days.   Plan: labs, gi

## 2018-10-15 NOTE — ED ADULT NURSE NOTE - OBJECTIVE STATEMENT
patient has c/o rectal bleeding x 4 days, normal bm, denies dizziness or sob, no hx of rectal bleeding, skin is warm to touch and intact. MD at bedside, will continue to monitor.

## 2018-10-15 NOTE — CONSULT NOTE ADULT - PROBLEM SELECTOR RECOMMENDATION 9
Penny-Colace (50/8.6) 2 tabs po bid  Miralax or Glycolax podow, # 527g/1bottle, sig. 17g , dissolve in 1 glass of water, po daily, prn    Analpram-HC (1* cortison, 1* pramoxine) rectal cream  Anosol-HC (1*) rectal cream daily to bid (after BM and after shower)  AnaMantle HC (lidocaine 3*, Hydrocortidsone 0.5*) cream x 30, tobi 1 pr daily    Senna 1-2 tabs qhs and colace 100mg three times a day  More vagetables and fluits, less fine foods.  Drink more water and excercise more  Avoid long time sitting position  Manaul message lower rectal-johnny area when taking shower.  TUCKs medicated pads and sits marker study  If symptoms persist will need colorectal surgery follow up      colonsaocvopy as outpt

## 2018-10-15 NOTE — CONSULT NOTE ADULT - SUBJECTIVE AND OBJECTIVE BOX
Chief Complaint:  Patient is a 68y old  Male who presents with a chief complaint of   · Chief Complaint: The patient is a 68y Male complaining of rectal bleeding.	  · HPI Objective Statement: 67 y/o M pt with hx of HTN and hemorrhoids presents to the ED c/o intermittent rectal bleeding with bowel movements for 4 days. Pt states that he noticed blood in the toilet bowl and mixed with the stool. He has not had a colonoscopy recently. Denies abd pain, diarrhea, constipation, nausea, vomiting, fever, or chills. No other complaints at this time.    Dr. Jewell- pmd	  · Presenting Symptoms: rectal bleeding	  · Negative Findings: no chills, no diarrhea, no fever, no nausea, no vomiting, no abd pain, constipation	  · Location: rectum	  · Timing: gradual onset, intermittent	  · Duration: day(s)  4	  · Quality: blood in the toilet bowl	  · Severity: MODERATE	  · Context: unknown	  · Recent Exposure To: none known	  · Aggravated Factors: none	  · Relieving Factors: none	  never had colonosocpy done no weight loss no melena    Allergies:  Bactrim (Anaphylaxis)  penicillin (Anaphylaxis)  penicillin (Unknown)  sulfa drugs (Unknown)      Medications:      PMHX/PSHX:  Hypertension  Hypertension  Abscess  No significant past surgical history  No significant past surgical history      Family history:      Social History:     ROS:     General:  No wt loss, fevers, chills, night sweats, fatigue,   Eyes:  Good vision, no reported pain  ENT:  No sore throat, pain, runny nose, dysphagia  CV:  No pain, palpitations, hypo/hypertension  Resp:  No dyspnea, cough, tachypnea, wheezing  GI:  No pain, No nausea, No vomiting, No diarrhea, No constipation, No weight loss, No fever, No pruritis, No rectal bleeding, No tarry stools, No dysphagia,  :  No pain, bleeding, incontinence, nocturia  Muscle:  No pain, weakness  Neuro:  No weakness, tingling, memory problems  Psych:  No fatigue, insomnia, mood problems, depression  Endocrine:  No polyuria, polydipsia, cold/heat intolerance  Heme:  No petechiae, ecchymosis, easy bruisability  Skin:  No rash, tattoos, scars, edema      PHYSICAL EXAM:   Vital Signs:  Vital Signs Last 24 Hrs  T(C): 36.7 (15 Oct 2018 15:50), Max: 36.7 (15 Oct 2018 15:50)  T(F): 98 (15 Oct 2018 15:50), Max: 98 (15 Oct 2018 15:50)  HR: 89 (15 Oct 2018 15:50) (89 - 89)  BP: 144/89 (15 Oct 2018 15:50) (144/89 - 144/89)  BP(mean): --  RR: 16 (15 Oct 2018 15:50) (16 - 16)  SpO2: 98% (15 Oct 2018 15:50) (98% - 98%)  Daily Height in cm: 167.64 (15 Oct 2018 15:50)    Daily     GENERAL:  Appears stated age, well-groomed, well-nourished, no distress  HEENT:  NC/AT,  conjunctivae clear and pink, no thyromegaly, nodules, adenopathy, no JVD, sclera -anicteric  CHEST:  Full & symmetric excursion, no increased effort, breath sounds clear  HEART:  Regular rhythm, S1, S2, no murmur/rub/S3/S4, no abdominal bruit, no edema  ABDOMEN:  Soft, non-tender, non-distended, normoactive bowel sounds,  no masses ,no hepato-splenomegaly, no signs of chronic liver disease  EXTEREMITIES:  no cyanosis,clubbing or edema  SKIN:  No rash/erythema/ecchymoses/petechiae/wounds/abscess/warm/dry  NEURO:  Alert, oriented, no asterixis, no tremor, no encephalopathy    LABS:                        14.8   11.49 )-----------( 190      ( 15 Oct 2018 17:05 )             44.4     10-15    136  |  102  |  23  ----------------------------<  125<H>  4.3   |  26  |  0.77    Ca    8.5      15 Oct 2018 17:05    TPro  6.2  /  Alb  3.1<L>  /  TBili  0.6  /  DBili  x   /  AST  16  /  ALT  27  /  AlkPhos  66  10-15    LIVER FUNCTIONS - ( 15 Oct 2018 17:05 )  Alb: 3.1 g/dL / Pro: 6.2 g/dL / ALK PHOS: 66 U/L / ALT: 27 U/L DA / AST: 16 U/L / GGT: x           PT/INR - ( 15 Oct 2018 17:05 )   PT: 11.5 sec;   INR: 1.05 ratio         PTT - ( 15 Oct 2018 17:05 )  PTT:28.8 sec        Imaging:

## 2018-10-17 ENCOUNTER — APPOINTMENT (OUTPATIENT)
Dept: INTERNAL MEDICINE | Facility: CLINIC | Age: 68
End: 2018-10-17
Payer: MEDICARE

## 2018-10-17 ENCOUNTER — NON-APPOINTMENT (OUTPATIENT)
Age: 68
End: 2018-10-17

## 2018-10-17 ENCOUNTER — APPOINTMENT (OUTPATIENT)
Dept: COLORECTAL SURGERY | Facility: CLINIC | Age: 68
End: 2018-10-17
Payer: MEDICARE

## 2018-10-17 VITALS
WEIGHT: 160 LBS | DIASTOLIC BLOOD PRESSURE: 90 MMHG | RESPIRATION RATE: 14 BRPM | TEMPERATURE: 98.9 F | BODY MASS INDEX: 25.71 KG/M2 | HEART RATE: 118 BPM | HEIGHT: 66 IN | OXYGEN SATURATION: 94 % | SYSTOLIC BLOOD PRESSURE: 160 MMHG

## 2018-10-17 VITALS — DIASTOLIC BLOOD PRESSURE: 90 MMHG | SYSTOLIC BLOOD PRESSURE: 158 MMHG

## 2018-10-17 DIAGNOSIS — R00.0 TACHYCARDIA, UNSPECIFIED: ICD-10-CM

## 2018-10-17 PROCEDURE — 46221 LIGATION OF HEMORRHOID(S): CPT

## 2018-10-17 PROCEDURE — 99213 OFFICE O/P EST LOW 20 MIN: CPT | Mod: 25

## 2018-10-17 PROCEDURE — 93000 ELECTROCARDIOGRAM COMPLETE: CPT

## 2018-10-17 PROCEDURE — 99203 OFFICE O/P NEW LOW 30 MIN: CPT

## 2018-10-17 NOTE — HISTORY OF PRESENT ILLNESS
[FreeTextEntry8] : rectal bleeding\par \par Pt went to the hospital 2 days ago for rectal bleeding. He continues to have it. No N/V. He is eating well. Had BM today and had red blood in the toilet. \par \par No chest pain or sob.

## 2018-10-17 NOTE — PHYSICAL EXAM
[No Acute Distress] : no acute distress [Well Nourished] : well nourished [Well Developed] : well developed [No Respiratory Distress] : no respiratory distress  [Clear to Auscultation] : lungs were clear to auscultation bilaterally [Normal Rate] : normal rate  [Regular Rhythm] : with a regular rhythm [No Edema] : there was no peripheral edema [Soft] : abdomen soft [Non Tender] : non-tender [Normal Affect] : the affect was normal [Normal Insight/Judgement] : insight and judgment were intact

## 2018-11-06 ENCOUNTER — APPOINTMENT (OUTPATIENT)
Dept: COLORECTAL SURGERY | Facility: CLINIC | Age: 68
End: 2018-11-06
Payer: MEDICARE

## 2018-11-06 VITALS
DIASTOLIC BLOOD PRESSURE: 105 MMHG | BODY MASS INDEX: 25.71 KG/M2 | SYSTOLIC BLOOD PRESSURE: 172 MMHG | HEART RATE: 87 BPM | TEMPERATURE: 98.1 F | WEIGHT: 160 LBS | HEIGHT: 66 IN

## 2018-11-06 PROCEDURE — 46221 LIGATION OF HEMORRHOID(S): CPT

## 2018-11-06 PROCEDURE — 99214 OFFICE O/P EST MOD 30 MIN: CPT | Mod: 25

## 2018-11-14 ENCOUNTER — APPOINTMENT (OUTPATIENT)
Dept: INTERNAL MEDICINE | Facility: CLINIC | Age: 68
End: 2018-11-14
Payer: MEDICARE

## 2018-11-14 ENCOUNTER — MEDICATION RENEWAL (OUTPATIENT)
Age: 68
End: 2018-11-14

## 2018-11-14 VITALS
RESPIRATION RATE: 14 BRPM | OXYGEN SATURATION: 98 % | WEIGHT: 167 LBS | BODY MASS INDEX: 26.84 KG/M2 | TEMPERATURE: 97.7 F | HEART RATE: 102 BPM | DIASTOLIC BLOOD PRESSURE: 82 MMHG | HEIGHT: 66 IN | SYSTOLIC BLOOD PRESSURE: 150 MMHG

## 2018-11-14 VITALS — SYSTOLIC BLOOD PRESSURE: 146 MMHG | DIASTOLIC BLOOD PRESSURE: 86 MMHG

## 2018-11-14 PROCEDURE — 99214 OFFICE O/P EST MOD 30 MIN: CPT

## 2018-11-14 NOTE — PHYSICAL EXAM
[No Acute Distress] : no acute distress [Well Nourished] : well nourished [Well Developed] : well developed [No Respiratory Distress] : no respiratory distress  [Clear to Auscultation] : lungs were clear to auscultation bilaterally [Normal Rate] : normal rate  [Regular Rhythm] : with a regular rhythm [No Edema] : there was no peripheral edema [Soft] : abdomen soft [Non Tender] : non-tender [Normal Gait] : normal gait [Coordination Grossly Intact] : coordination grossly intact [No Focal Deficits] : no focal deficits [de-identified] : no calf edema or tenderness [de-identified] : points to right hip and lateral right thigh and lower right back where pain is. good rom of hip.

## 2018-11-14 NOTE — HISTORY OF PRESENT ILLNESS
[FreeTextEntry8] : cc: leg pain\par \par c/o leg pain for 1-2 weeks. It is in right leg from the hip. h/o DVT right leg. Denies any pain or swelling in the calf. \par \par BP at home has been 120-130/. \par \par His cholesterol is high. He wants me to speak to his daughter before he will start medication. I tried to call while he was in the office and there was no answer. \par \par His rectal bleeding has stopped and he plans to f/u with colorectal specialist. \par \par

## 2018-12-04 ENCOUNTER — APPOINTMENT (OUTPATIENT)
Dept: COLORECTAL SURGERY | Facility: CLINIC | Age: 68
End: 2018-12-04
Payer: MEDICARE

## 2018-12-04 VITALS
WEIGHT: 167 LBS | BODY MASS INDEX: 26.84 KG/M2 | DIASTOLIC BLOOD PRESSURE: 92 MMHG | HEART RATE: 97 BPM | HEIGHT: 66 IN | TEMPERATURE: 98.1 F | SYSTOLIC BLOOD PRESSURE: 166 MMHG

## 2018-12-04 PROCEDURE — 99214 OFFICE O/P EST MOD 30 MIN: CPT | Mod: 25

## 2018-12-04 PROCEDURE — 46221 LIGATION OF HEMORRHOID(S): CPT

## 2019-01-07 ENCOUNTER — APPOINTMENT (OUTPATIENT)
Dept: COLORECTAL SURGERY | Facility: CLINIC | Age: 69
End: 2019-01-07
Payer: MEDICARE

## 2019-01-07 VITALS
DIASTOLIC BLOOD PRESSURE: 104 MMHG | SYSTOLIC BLOOD PRESSURE: 180 MMHG | WEIGHT: 167 LBS | BODY MASS INDEX: 26.84 KG/M2 | TEMPERATURE: 97.8 F | RESPIRATION RATE: 14 BRPM | HEART RATE: 97 BPM | HEIGHT: 66 IN

## 2019-01-07 PROCEDURE — 46221 LIGATION OF HEMORRHOID(S): CPT

## 2019-01-07 PROCEDURE — 99213 OFFICE O/P EST LOW 20 MIN: CPT | Mod: 25

## 2019-01-07 NOTE — PHYSICAL EXAM
[Normal rectal exam] : exam was normal [Reduce Spontaneously] : a spontaneously reducible (grade II) [Skin Tags] : there were no residual hemorrhoidal skin tags seen [Normal] : was normal [None] : there was no rectal mass  [Respiratory Effort] : normal respiratory effort [Calm] : calm [de-identified] : grade 2 internal hemorrhoids, mildly friable [de-identified] : well appearing, in no distress [de-identified] : normocephalic, atraumatic [de-identified] : moves extremities without difficulty [de-identified] : warm and dry [de-identified] : alert and oriented x 3

## 2019-01-07 NOTE — HISTORY OF PRESENT ILLNESS
[FreeTextEntry1] : 68 year old male who presents for follow up of rectal bleeding from hemorrhoids. He has undergone hemorrhoid banding with improvement in symptoms but noticed recurrent bleeding several days prior. He denies constipation or straining to evacuate stools. He states BMs are passed daily and are soft in consistency. No significant hemorrhoidal burning or itching is reported.

## 2019-01-07 NOTE — ASSESSMENT
[FreeTextEntry1] : Mr. De Paz presents to the office with rectal bleeding after his BMs. He denies constipation or hard stools. Anoscopy in office reveals mildly inflamed internal hemorrhoids and the most prominent column appeared to be in the right posterior location. A band was applied to that site and he was advised on what to expect post procedure. We will also renew his proctosol cream to help allevaite any inflammation that may be contributing to his hemorrhoidal bleeding. He will followup in office in 2 -3 weeks' time if needed.

## 2019-01-07 NOTE — PROCEDURE
[FreeTextEntry1] : Risks and benefit of hemorrhoid banding reviewed. A right posterior hemorrhoid column was banded above the dentate line. He tolerated the procedure

## 2019-01-21 ENCOUNTER — RX RENEWAL (OUTPATIENT)
Age: 69
End: 2019-01-21

## 2019-01-21 ENCOUNTER — APPOINTMENT (OUTPATIENT)
Dept: COLORECTAL SURGERY | Facility: CLINIC | Age: 69
End: 2019-01-21
Payer: MEDICARE

## 2019-01-21 VITALS
DIASTOLIC BLOOD PRESSURE: 90 MMHG | HEIGHT: 66 IN | WEIGHT: 167 LBS | TEMPERATURE: 98.1 F | SYSTOLIC BLOOD PRESSURE: 178 MMHG | HEART RATE: 97 BPM | BODY MASS INDEX: 26.84 KG/M2

## 2019-01-21 PROCEDURE — 99213 OFFICE O/P EST LOW 20 MIN: CPT | Mod: 25

## 2019-01-21 PROCEDURE — XXXXX: CPT

## 2019-01-21 PROCEDURE — 46221 LIGATION OF HEMORRHOID(S): CPT

## 2019-01-21 NOTE — PHYSICAL EXAM
[Normal rectal exam] : exam was normal [Reduce Spontaneously] : a spontaneously reducible (grade II) [Skin Tags] : there were no residual hemorrhoidal skin tags seen [Normal] : was normal [None] : there was no rectal mass  [Respiratory Effort] : normal respiratory effort [Calm] : calm [de-identified] : grade 2 internal hemorrhoids, mildly friable in right anterior position [de-identified] : well appearing, in no distress [de-identified] : normocephalic, atraumatic [de-identified] : moves extremities without difficulty [de-identified] : warm and dry [de-identified] : alert and oriented x 3

## 2019-01-21 NOTE — PROCEDURE
[FreeTextEntry1] : Risks and benefit of hemorrhoid banding reviewed. Right lateral hemorrhoid column was banded above the dentate line x 2. He tolerated the procedure

## 2019-01-21 NOTE — HISTORY OF PRESENT ILLNESS
[FreeTextEntry1] : 68 year old male who presents for follow up of rectal bleeding from hemorrhoids. He has undergone hemorrhoid banding with improvement in symptoms but noticed recurrent bleeding x 1 episode last week. . He denies constipation or straining to evacuate stools. He states BMs are passed daily and are soft in consistency. No significant hemorrhoidal burning or itching is reported.

## 2019-02-27 ENCOUNTER — APPOINTMENT (OUTPATIENT)
Dept: COLORECTAL SURGERY | Facility: CLINIC | Age: 69
End: 2019-02-27
Payer: MEDICARE

## 2019-02-27 VITALS
BODY MASS INDEX: 26.84 KG/M2 | HEIGHT: 66 IN | WEIGHT: 167 LBS | DIASTOLIC BLOOD PRESSURE: 95 MMHG | RESPIRATION RATE: 14 BRPM | HEART RATE: 84 BPM | TEMPERATURE: 98.1 F | SYSTOLIC BLOOD PRESSURE: 170 MMHG

## 2019-02-27 PROCEDURE — 99213 OFFICE O/P EST LOW 20 MIN: CPT | Mod: 25

## 2019-02-27 PROCEDURE — 46221 LIGATION OF HEMORRHOID(S): CPT

## 2019-02-27 PROCEDURE — XXXXX: CPT

## 2019-02-27 NOTE — PROCEDURE
[FreeTextEntry1] : Risks and benefit of hemorrhoid banding reviewed. Right posterior hemorrhoid column was banded above the dentate line x 2. He tolerated the procedure

## 2019-02-27 NOTE — HISTORY OF PRESENT ILLNESS
[FreeTextEntry1] : 68 year old male who presents for follow up of rectal bleeding from hemorrhoids. He has undergone hemorrhoid banding with improvement in symptoms but noticed recurrent bleeding over the last 2 days. He states that he did have to strain a little more than usual to evacuate stools.

## 2019-02-27 NOTE — PHYSICAL EXAM
[Normal rectal exam] : exam was normal [Reduce Spontaneously] : a spontaneously reducible (grade II) [Skin Tags] : there were no residual hemorrhoidal skin tags seen [Normal] : was normal [None] : there was no rectal mass  [Respiratory Effort] : normal respiratory effort [Calm] : calm [de-identified] : grade 2 internal hemorrhoids, friable and bleeding in right posterior location [de-identified] : well appearing, in no distress [de-identified] : normocephalic, atraumatic [de-identified] : moves extremities without difficulty [de-identified] : warm and dry [de-identified] : alert and oriented x 3

## 2019-02-27 NOTE — ASSESSMENT
[FreeTextEntry1] : Mr. De Paz presents to the office with rectal bleeding after his BMs. . Anoscopy in office reveals mildly inflamed internal hemorrhoids and the most prominent column appeared to be in the right posterior location. Two bands were applied to that site and he was advised on what to expect post procedure. We will renew his prescription for hydrocortisone cream to help with hemorrhoidal inflammation. He will followup in office in 2 -3 weeks' time if needed.

## 2019-03-11 ENCOUNTER — RX RENEWAL (OUTPATIENT)
Age: 69
End: 2019-03-11

## 2019-03-13 ENCOUNTER — APPOINTMENT (OUTPATIENT)
Dept: COLORECTAL SURGERY | Facility: CLINIC | Age: 69
End: 2019-03-13
Payer: MEDICARE

## 2019-03-13 PROCEDURE — XXXXX: CPT

## 2019-03-13 PROCEDURE — 99213 OFFICE O/P EST LOW 20 MIN: CPT | Mod: 25

## 2019-03-13 PROCEDURE — 46221 LIGATION OF HEMORRHOID(S): CPT

## 2019-03-13 NOTE — HISTORY OF PRESENT ILLNESS
[FreeTextEntry1] : 68 year old male who presents for follow up of rectal bleeding from hemorrhoids. He has undergone hemorrhoid banding with improvement in symptoms and notices only occasional bleeding. His BMs have been fairly regular.

## 2019-03-13 NOTE — PHYSICAL EXAM
[Normal rectal exam] : exam was normal [Reduce Spontaneously] : a spontaneously reducible (grade II) [Skin Tags] : there were no residual hemorrhoidal skin tags seen [Normal] : was normal [None] : there was no rectal mass  [Respiratory Effort] : normal respiratory effort [Calm] : calm [de-identified] : grade 2 internal hemorrhoids, friable and bleeding in right posterior location [de-identified] : well appearing, in no distress [de-identified] : normocephalic, atraumatic [de-identified] : moves extremities without difficulty [de-identified] : warm and dry [de-identified] : alert and oriented x 3

## 2019-03-13 NOTE — PROCEDURE
[FreeTextEntry1] : Risks and benefit of hemorrhoid banding reviewed. Right posterior and left lateral hemorrhoid column was banded above the dentate line x 2. He tolerated the procedure

## 2019-03-13 NOTE — ASSESSMENT
[FreeTextEntry1] : Mr. De Paz presents to the office with rectal bleeding after his BMs. Anoscopy in office reveals mildly inflamed internal hemorrhoids and the most prominent column appeared to be in the right posterior location. One band was applied to that site as well as the left lateral site and he was advised on what to expect post procedure. We will renew his prescription for hydrocortisone cream to help with hemorrhoidal inflammation. He will followup in office in 2 -3 weeks' time if needed.

## 2019-03-25 ENCOUNTER — MEDICATION RENEWAL (OUTPATIENT)
Age: 69
End: 2019-03-25

## 2019-04-03 ENCOUNTER — OTHER (OUTPATIENT)
Age: 69
End: 2019-04-03

## 2019-04-04 ENCOUNTER — APPOINTMENT (OUTPATIENT)
Dept: COLORECTAL SURGERY | Facility: CLINIC | Age: 69
End: 2019-04-04

## 2019-04-22 ENCOUNTER — MEDICATION RENEWAL (OUTPATIENT)
Age: 69
End: 2019-04-22

## 2019-04-29 ENCOUNTER — MEDICATION RENEWAL (OUTPATIENT)
Age: 69
End: 2019-04-29

## 2019-05-02 ENCOUNTER — APPOINTMENT (OUTPATIENT)
Dept: COLORECTAL SURGERY | Facility: CLINIC | Age: 69
End: 2019-05-02

## 2019-06-03 ENCOUNTER — MEDICATION RENEWAL (OUTPATIENT)
Age: 69
End: 2019-06-03

## 2019-06-18 ENCOUNTER — RX RENEWAL (OUTPATIENT)
Age: 69
End: 2019-06-18

## 2019-06-26 ENCOUNTER — RX RENEWAL (OUTPATIENT)
Age: 69
End: 2019-06-26

## 2019-06-26 ENCOUNTER — APPOINTMENT (OUTPATIENT)
Dept: COLORECTAL SURGERY | Facility: CLINIC | Age: 69
End: 2019-06-26

## 2019-07-01 ENCOUNTER — APPOINTMENT (OUTPATIENT)
Dept: COLORECTAL SURGERY | Facility: CLINIC | Age: 69
End: 2019-07-01

## 2019-07-09 ENCOUNTER — RX RENEWAL (OUTPATIENT)
Age: 69
End: 2019-07-09

## 2019-07-14 ENCOUNTER — INPATIENT (INPATIENT)
Facility: HOSPITAL | Age: 69
LOS: 0 days | Discharge: HOME | DRG: 352 | End: 2019-07-15
Attending: SPECIALIST | Admitting: SPECIALIST
Payer: COMMERCIAL

## 2019-07-14 ENCOUNTER — TRANSCRIPTION ENCOUNTER (OUTPATIENT)
Age: 69
End: 2019-07-14

## 2019-07-14 VITALS
WEIGHT: 160.06 LBS | SYSTOLIC BLOOD PRESSURE: 152 MMHG | DIASTOLIC BLOOD PRESSURE: 100 MMHG | TEMPERATURE: 99 F | HEIGHT: 66 IN | OXYGEN SATURATION: 100 % | RESPIRATION RATE: 18 BRPM | HEART RATE: 100 BPM

## 2019-07-14 DIAGNOSIS — K40.20 BILATERAL INGUINAL HERNIA, WITHOUT OBSTRUCTION OR GANGRENE, NOT SPECIFIED AS RECURRENT: ICD-10-CM

## 2019-07-14 LAB
ALBUMIN SERPL ELPH-MCNC: 3.4 G/DL — SIGNIFICANT CHANGE UP (ref 3.3–5)
ALP SERPL-CCNC: 58 U/L — SIGNIFICANT CHANGE UP (ref 30–120)
ALT FLD-CCNC: 30 U/L DA — SIGNIFICANT CHANGE UP (ref 10–60)
ANION GAP SERPL CALC-SCNC: 3 MMOL/L — LOW (ref 5–17)
APPEARANCE UR: CLEAR — SIGNIFICANT CHANGE UP
APTT BLD: 31.1 SEC — SIGNIFICANT CHANGE UP (ref 28.5–37)
AST SERPL-CCNC: 19 U/L — SIGNIFICANT CHANGE UP (ref 10–40)
BASOPHILS # BLD AUTO: 0.06 K/UL — SIGNIFICANT CHANGE UP (ref 0–0.2)
BASOPHILS NFR BLD AUTO: 0.5 % — SIGNIFICANT CHANGE UP (ref 0–2)
BILIRUB SERPL-MCNC: 0.8 MG/DL — SIGNIFICANT CHANGE UP (ref 0.2–1.2)
BILIRUB UR-MCNC: NEGATIVE — SIGNIFICANT CHANGE UP
BUN SERPL-MCNC: 21 MG/DL — SIGNIFICANT CHANGE UP (ref 7–23)
CALCIUM SERPL-MCNC: 8.3 MG/DL — LOW (ref 8.4–10.5)
CHLORIDE SERPL-SCNC: 99 MMOL/L — SIGNIFICANT CHANGE UP (ref 96–108)
CO2 SERPL-SCNC: 29 MMOL/L — SIGNIFICANT CHANGE UP (ref 22–31)
COLOR SPEC: YELLOW — SIGNIFICANT CHANGE UP
CREAT SERPL-MCNC: 0.71 MG/DL — SIGNIFICANT CHANGE UP (ref 0.5–1.3)
DIFF PNL FLD: NEGATIVE — SIGNIFICANT CHANGE UP
EOSINOPHIL # BLD AUTO: 0.21 K/UL — SIGNIFICANT CHANGE UP (ref 0–0.5)
EOSINOPHIL NFR BLD AUTO: 1.6 % — SIGNIFICANT CHANGE UP (ref 0–6)
GLUCOSE SERPL-MCNC: 118 MG/DL — HIGH (ref 70–99)
GLUCOSE UR QL: NEGATIVE MG/DL — SIGNIFICANT CHANGE UP
HCT VFR BLD CALC: 43 % — SIGNIFICANT CHANGE UP (ref 39–50)
HGB BLD-MCNC: 14.5 G/DL — SIGNIFICANT CHANGE UP (ref 13–17)
IMM GRANULOCYTES NFR BLD AUTO: 0.6 % — SIGNIFICANT CHANGE UP (ref 0–1.5)
INR BLD: 1.12 RATIO — SIGNIFICANT CHANGE UP (ref 0.88–1.16)
KETONES UR-MCNC: NEGATIVE — SIGNIFICANT CHANGE UP
LACTATE SERPL-SCNC: 1 MMOL/L — SIGNIFICANT CHANGE UP (ref 0.7–2)
LEUKOCYTE ESTERASE UR-ACNC: ABNORMAL
LYMPHOCYTES # BLD AUTO: 1.92 K/UL — SIGNIFICANT CHANGE UP (ref 1–3.3)
LYMPHOCYTES # BLD AUTO: 14.4 % — SIGNIFICANT CHANGE UP (ref 13–44)
MCHC RBC-ENTMCNC: 31.5 PG — SIGNIFICANT CHANGE UP (ref 27–34)
MCHC RBC-ENTMCNC: 33.7 GM/DL — SIGNIFICANT CHANGE UP (ref 32–36)
MCV RBC AUTO: 93.5 FL — SIGNIFICANT CHANGE UP (ref 80–100)
MONOCYTES # BLD AUTO: 0.68 K/UL — SIGNIFICANT CHANGE UP (ref 0–0.9)
MONOCYTES NFR BLD AUTO: 5.1 % — SIGNIFICANT CHANGE UP (ref 2–14)
NEUTROPHILS # BLD AUTO: 10.36 K/UL — HIGH (ref 1.8–7.4)
NEUTROPHILS NFR BLD AUTO: 77.8 % — HIGH (ref 43–77)
NITRITE UR-MCNC: NEGATIVE — SIGNIFICANT CHANGE UP
NRBC # BLD: 0 /100 WBCS — SIGNIFICANT CHANGE UP (ref 0–0)
PH UR: 7 — SIGNIFICANT CHANGE UP (ref 5–8)
PLATELET # BLD AUTO: 190 K/UL — SIGNIFICANT CHANGE UP (ref 150–400)
POTASSIUM SERPL-MCNC: 3.6 MMOL/L — SIGNIFICANT CHANGE UP (ref 3.5–5.3)
POTASSIUM SERPL-SCNC: 3.6 MMOL/L — SIGNIFICANT CHANGE UP (ref 3.5–5.3)
PROT SERPL-MCNC: 6.3 G/DL — SIGNIFICANT CHANGE UP (ref 6–8.3)
PROT UR-MCNC: NEGATIVE MG/DL — SIGNIFICANT CHANGE UP
PROTHROM AB SERPL-ACNC: 12.3 SEC — SIGNIFICANT CHANGE UP (ref 10–12.9)
RBC # BLD: 4.6 M/UL — SIGNIFICANT CHANGE UP (ref 4.2–5.8)
RBC # FLD: 13 % — SIGNIFICANT CHANGE UP (ref 10.3–14.5)
SODIUM SERPL-SCNC: 131 MMOL/L — LOW (ref 135–145)
SP GR SPEC: 1.01 — SIGNIFICANT CHANGE UP (ref 1.01–1.02)
UROBILINOGEN FLD QL: NEGATIVE MG/DL — SIGNIFICANT CHANGE UP
WBC # BLD: 13.31 K/UL — HIGH (ref 3.8–10.5)
WBC # FLD AUTO: 13.31 K/UL — HIGH (ref 3.8–10.5)

## 2019-07-14 PROCEDURE — 99284 EMERGENCY DEPT VISIT MOD MDM: CPT

## 2019-07-14 PROCEDURE — 99222 1ST HOSP IP/OBS MODERATE 55: CPT

## 2019-07-14 PROCEDURE — 71045 X-RAY EXAM CHEST 1 VIEW: CPT | Mod: 26

## 2019-07-14 PROCEDURE — 74176 CT ABD & PELVIS W/O CONTRAST: CPT | Mod: 26

## 2019-07-14 PROCEDURE — 93010 ELECTROCARDIOGRAM REPORT: CPT

## 2019-07-14 RX ORDER — SODIUM CHLORIDE 9 MG/ML
1000 INJECTION INTRAMUSCULAR; INTRAVENOUS; SUBCUTANEOUS ONCE
Refills: 0 | Status: COMPLETED | OUTPATIENT
Start: 2019-07-14 | End: 2019-07-14

## 2019-07-14 RX ORDER — LISINOPRIL 2.5 MG/1
20 TABLET ORAL DAILY
Refills: 0 | Status: DISCONTINUED | OUTPATIENT
Start: 2019-07-14 | End: 2019-07-15

## 2019-07-14 RX ORDER — SODIUM CHLORIDE 9 MG/ML
1000 INJECTION INTRAMUSCULAR; INTRAVENOUS; SUBCUTANEOUS
Refills: 0 | Status: DISCONTINUED | OUTPATIENT
Start: 2019-07-14 | End: 2019-07-15

## 2019-07-14 RX ORDER — PANTOPRAZOLE SODIUM 20 MG/1
40 TABLET, DELAYED RELEASE ORAL
Refills: 0 | Status: DISCONTINUED | OUTPATIENT
Start: 2019-07-14 | End: 2019-07-15

## 2019-07-14 RX ADMIN — SODIUM CHLORIDE 1000 MILLILITER(S): 9 INJECTION INTRAMUSCULAR; INTRAVENOUS; SUBCUTANEOUS at 13:51

## 2019-07-14 RX ADMIN — SODIUM CHLORIDE 1000 MILLILITER(S): 9 INJECTION INTRAMUSCULAR; INTRAVENOUS; SUBCUTANEOUS at 15:20

## 2019-07-14 NOTE — ED PROVIDER NOTE - CARE PLAN
Principal Discharge DX:	Bilateral inguinal hernia without obstruction or gangrene, recurrence not specified

## 2019-07-14 NOTE — H&P ADULT - NSHPPHYSICALEXAM_GEN_ALL_CORE
Afeb  /100   BMI 25  Incarcerated RIH which I was able to reduce.    Large chronic LIH which is nonreducible but nontender  Abd: nontender, nondistended

## 2019-07-14 NOTE — ED PROVIDER NOTE - OBJECTIVE STATEMENT
Patient present to ED with CT results from 7/14/19, IMPRESSION: large inguinal hernias, left >right. Right inguinal hernia contains loop of small bowel as well as fat. oral contrast is seen proximal and distal to hernia. The left inguinal hernia contains a portion  of the bladder proximally as well as loops of non-distended small bowel and fat (distal to the bladder). 68 yo male present to ED c/o right inguinal pain. Patient saw his PCP Dr. Lopez today. Dr Lopez sent patient to [facility] for CT abdomen and pelvis with oral contrast. Patient present to ED with CT results from 7/14/19, IMPRESSION: large inguinal hernias, left >right. Right inguinal hernia contains loop of small bowel as well as fat. oral contrast is seen proximal and distal to hernia. The left inguinal hernia contains a portion  of the bladder proximally as well as loops of non-distended small bowel and fat (distal to the bladder). patient denies fever chills, n/v/d/c. Last BM this morning and was normal. NO urinary symptoms. No frequency, hesitancy or dysuria, normal urine flow.

## 2019-07-14 NOTE — ED PROVIDER NOTE - GASTROINTESTINAL NEGATIVE STATEMENT, MLM
no abdominal pain, no bloating, no constipation, no diarrhea, no nausea and no vomiting. moving bowels normally.

## 2019-07-14 NOTE — H&P ADULT - HISTORY OF PRESENT ILLNESS
Pt is a 69 year old man who had onset this morning, as he was going to work, of right groin bulge and swelling.  He has a chronic painless left inguinal hernia but has never been aware of a RIH   No nausea

## 2019-07-14 NOTE — ED PROVIDER NOTE - CLINICAL SUMMARY MEDICAL DECISION MAKING FREE TEXT BOX
69 male with b/l inguinal hernia with right groin pain. CT as per HPI. Labs, consult surgery Dr. Haley who will see patient in ED.

## 2019-07-14 NOTE — H&P ADULT - NSHPLABSRESULTS_GEN_ALL_CORE
WBC 13  CT done as outpatient and repeated here just now: loop of small intestine in RIH without obstruction, bowel,fat and bladder in large LIH  lactate wnl

## 2019-07-14 NOTE — ED PROVIDER NOTE - PROGRESS NOTE DETAILS
Bozena OSUNA for ED Attending Dr. Fitzgerald: 68 y/o male with PMHx of HTN presents to the ED c/o R sided groin pain beginning this AM consistent with hernia. +mild chills. Seen by PCP Dr. Lopez who sent pt for CT scan which showed b/l inguinal hernias. Denies N/V, fever, change in BMs. Allergic to Penicillin, Sulfa. Nonsmoker.  On exam, Well developed, well nourished, NAD. abd soft, nontender, nondistended RUQ, RLQ, LUQ, LLQ. no CVAT. TTP R inguinal hernia. Bozena OSUNA for ED Attending Dr. Fitzgerald: 68 y/o male with PMHx of HTN presents to the ED c/o R sided groin pain beginning this AM at site of hernia. +mild chills. Seen by PCP Dr. Lopez who sent pt for CT scan which showed b/l inguinal hernias. Denies N/V, fever, change in BMs. Allergic to Penicillin, Sulfa. Nonsmoker.  On exam, Well developed, well nourished, NAD. abd soft, nontender, nondistended RUQ, RLQ, LUQ, LLQ. no CVAT. TTP R inguinal hernia.

## 2019-07-14 NOTE — ED PROVIDER NOTE - CHPI ED SYMPTOMS NEG
no fever/no diarrhea/no vomiting/no hematuria/no nausea/no blood in stool/no abdominal distension/no dysuria/no burning urination/no chills

## 2019-07-14 NOTE — H&P ADULT - ASSESSMENT
Incarcerated RIH, now reduced, but likely to reincarcerate.  Pt wants to have surgery and wants to stay to have it done tomorrow.  Today is Sunday and I am on call to 3 ER's, and cannot do non-emergency surgery today.

## 2019-07-15 ENCOUNTER — TRANSCRIPTION ENCOUNTER (OUTPATIENT)
Age: 69
End: 2019-07-15

## 2019-07-15 ENCOUNTER — RESULT REVIEW (OUTPATIENT)
Age: 69
End: 2019-07-15

## 2019-07-15 VITALS
TEMPERATURE: 98 F | SYSTOLIC BLOOD PRESSURE: 179 MMHG | RESPIRATION RATE: 16 BRPM | HEART RATE: 58 BPM | OXYGEN SATURATION: 97 % | DIASTOLIC BLOOD PRESSURE: 97 MMHG

## 2019-07-15 LAB
ANION GAP SERPL CALC-SCNC: 7 MMOL/L — SIGNIFICANT CHANGE UP (ref 5–17)
BUN SERPL-MCNC: 15 MG/DL — SIGNIFICANT CHANGE UP (ref 7–23)
CALCIUM SERPL-MCNC: 8.5 MG/DL — SIGNIFICANT CHANGE UP (ref 8.4–10.5)
CHLORIDE SERPL-SCNC: 105 MMOL/L — SIGNIFICANT CHANGE UP (ref 96–108)
CO2 SERPL-SCNC: 28 MMOL/L — SIGNIFICANT CHANGE UP (ref 22–31)
CREAT SERPL-MCNC: 0.62 MG/DL — SIGNIFICANT CHANGE UP (ref 0.5–1.3)
GLUCOSE SERPL-MCNC: 95 MG/DL — SIGNIFICANT CHANGE UP (ref 70–99)
HCT VFR BLD CALC: 43.4 % — SIGNIFICANT CHANGE UP (ref 39–50)
HGB BLD-MCNC: 14.3 G/DL — SIGNIFICANT CHANGE UP (ref 13–17)
MCHC RBC-ENTMCNC: 31.2 PG — SIGNIFICANT CHANGE UP (ref 27–34)
MCHC RBC-ENTMCNC: 32.9 GM/DL — SIGNIFICANT CHANGE UP (ref 32–36)
MCV RBC AUTO: 94.6 FL — SIGNIFICANT CHANGE UP (ref 80–100)
NRBC # BLD: 0 /100 WBCS — SIGNIFICANT CHANGE UP (ref 0–0)
PLATELET # BLD AUTO: 185 K/UL — SIGNIFICANT CHANGE UP (ref 150–400)
POTASSIUM SERPL-MCNC: 3.9 MMOL/L — SIGNIFICANT CHANGE UP (ref 3.5–5.3)
POTASSIUM SERPL-SCNC: 3.9 MMOL/L — SIGNIFICANT CHANGE UP (ref 3.5–5.3)
RBC # BLD: 4.59 M/UL — SIGNIFICANT CHANGE UP (ref 4.2–5.8)
RBC # FLD: 13 % — SIGNIFICANT CHANGE UP (ref 10.3–14.5)
SODIUM SERPL-SCNC: 140 MMOL/L — SIGNIFICANT CHANGE UP (ref 135–145)
WBC # BLD: 8.19 K/UL — SIGNIFICANT CHANGE UP (ref 3.8–10.5)
WBC # FLD AUTO: 8.19 K/UL — SIGNIFICANT CHANGE UP (ref 3.8–10.5)

## 2019-07-15 PROCEDURE — 49505 PRP I/HERN INIT REDUC >5 YR: CPT | Mod: AS

## 2019-07-15 PROCEDURE — 80048 BASIC METABOLIC PNL TOTAL CA: CPT

## 2019-07-15 PROCEDURE — 80053 COMPREHEN METABOLIC PANEL: CPT

## 2019-07-15 PROCEDURE — C1781: CPT

## 2019-07-15 PROCEDURE — 85610 PROTHROMBIN TIME: CPT

## 2019-07-15 PROCEDURE — 85027 COMPLETE CBC AUTOMATED: CPT

## 2019-07-15 PROCEDURE — 88302 TISSUE EXAM BY PATHOLOGIST: CPT

## 2019-07-15 PROCEDURE — 99285 EMERGENCY DEPT VISIT HI MDM: CPT | Mod: 25

## 2019-07-15 PROCEDURE — 86900 BLOOD TYPING SEROLOGIC ABO: CPT

## 2019-07-15 PROCEDURE — 36415 COLL VENOUS BLD VENIPUNCTURE: CPT

## 2019-07-15 PROCEDURE — 83605 ASSAY OF LACTIC ACID: CPT

## 2019-07-15 PROCEDURE — 86901 BLOOD TYPING SEROLOGIC RH(D): CPT

## 2019-07-15 PROCEDURE — 88302 TISSUE EXAM BY PATHOLOGIST: CPT | Mod: 26

## 2019-07-15 PROCEDURE — 85730 THROMBOPLASTIN TIME PARTIAL: CPT

## 2019-07-15 PROCEDURE — 86850 RBC ANTIBODY SCREEN: CPT

## 2019-07-15 PROCEDURE — 81001 URINALYSIS AUTO W/SCOPE: CPT

## 2019-07-15 PROCEDURE — 55520 REMOVAL OF SPERM CORD LESION: CPT | Mod: AS,59

## 2019-07-15 PROCEDURE — 99232 SBSQ HOSP IP/OBS MODERATE 35: CPT

## 2019-07-15 PROCEDURE — 93005 ELECTROCARDIOGRAM TRACING: CPT

## 2019-07-15 PROCEDURE — 74176 CT ABD & PELVIS W/O CONTRAST: CPT

## 2019-07-15 PROCEDURE — 93010 ELECTROCARDIOGRAM REPORT: CPT

## 2019-07-15 PROCEDURE — 71045 X-RAY EXAM CHEST 1 VIEW: CPT

## 2019-07-15 RX ORDER — HYDROMORPHONE HYDROCHLORIDE 2 MG/ML
0.5 INJECTION INTRAMUSCULAR; INTRAVENOUS; SUBCUTANEOUS
Refills: 0 | Status: DISCONTINUED | OUTPATIENT
Start: 2019-07-15 | End: 2019-07-15

## 2019-07-15 RX ORDER — SODIUM CHLORIDE 9 MG/ML
1000 INJECTION, SOLUTION INTRAVENOUS
Refills: 0 | Status: DISCONTINUED | OUTPATIENT
Start: 2019-07-15 | End: 2019-07-15

## 2019-07-15 RX ORDER — HYDRALAZINE HCL 50 MG
25 TABLET ORAL EVERY 6 HOURS
Refills: 0 | Status: DISCONTINUED | OUTPATIENT
Start: 2019-07-15 | End: 2019-07-15

## 2019-07-15 RX ORDER — ONDANSETRON 8 MG/1
4 TABLET, FILM COATED ORAL ONCE
Refills: 0 | Status: DISCONTINUED | OUTPATIENT
Start: 2019-07-15 | End: 2019-07-15

## 2019-07-15 RX ORDER — OXYCODONE AND ACETAMINOPHEN 5; 325 MG/1; MG/1
1 TABLET ORAL EVERY 4 HOURS
Refills: 0 | Status: DISCONTINUED | OUTPATIENT
Start: 2019-07-15 | End: 2019-07-15

## 2019-07-15 RX ADMIN — LISINOPRIL 20 MILLIGRAM(S): 2.5 TABLET ORAL at 06:12

## 2019-07-15 RX ADMIN — HYDROMORPHONE HYDROCHLORIDE 0.5 MILLIGRAM(S): 2 INJECTION INTRAMUSCULAR; INTRAVENOUS; SUBCUTANEOUS at 13:45

## 2019-07-15 RX ADMIN — SODIUM CHLORIDE 80 MILLILITER(S): 9 INJECTION INTRAMUSCULAR; INTRAVENOUS; SUBCUTANEOUS at 06:12

## 2019-07-15 RX ADMIN — SODIUM CHLORIDE 75 MILLILITER(S): 9 INJECTION, SOLUTION INTRAVENOUS at 13:36

## 2019-07-15 RX ADMIN — PANTOPRAZOLE SODIUM 40 MILLIGRAM(S): 20 TABLET, DELAYED RELEASE ORAL at 06:12

## 2019-07-15 RX ADMIN — HYDROMORPHONE HYDROCHLORIDE 0.5 MILLIGRAM(S): 2 INJECTION INTRAMUSCULAR; INTRAVENOUS; SUBCUTANEOUS at 13:27

## 2019-07-15 NOTE — PROGRESS NOTE ADULT - SUBJECTIVE AND OBJECTIVE BOX
CC.  Groin pain  HPI.  Patient reports pain is controlled.  Offers no other complaints              Constitutional: No fever, fatigue or weight loss.  Skin: No rash.  Eyes: No recent vision problems or eye pain.  ENT: No congestion, ear pain, or sore throat.  Endocrine: No thyroid problems.  Cardiovascular: No chest pain or palpation.  Respiratory: No cough, shortness of breath, congestion, or wheezing.  Gastrointestinal: No abdominal pain, nausea, vomiting, or diarrhea.  Genitourinary: No dysuria.  Musculoskeletal: No joint swelling.  Neurologic: No headache.      Vital Signs Last 24 Hrs  T(C): 36.7 (07-15-19 @ 07:09), Max: 37 (19 @ 13:01)  T(F): 98 (07-15-19 @ 07:09), Max: 98.6 (19 @ 13:01)  HR: 78 (07-15-19 @ 07:09) (68 - 100)  BP: 173/81 (07-15-19 @ 07:09) (152/100 - 173/81)  BP(mean): --  RR: 16 (07-15-19 @ 07:09) (16 - 18)  SpO2: 98% (07-15-19 @ 07:09) (97% - 100%)        PHYSICAL EXAM-  GENERAL: NAD, well-groomed, well-developed  HEAD:  Atraumatic, Normocephalic  EYES: EOMI, PERRLA, conjunctiva and sclera clear  NECK: Supple, No JVD, Normal thyroid  NERVOUS SYSTEM:  Alert & Oriented X3, Motor Strength 5/5 B/L upper and lower extremities; DTRs 2+ intact and symmetric  CHEST/LUNG: Clear to percussion bilaterally; No rales, rhonchi, wheezing, or rubs  HEART: Regular rate and rhythm; No murmurs, rubs, or gallops  ABDOMEN: Soft, Nontender, Nondistended; Bowel sounds present  EXTREMITIES:  2+ Peripheral Pulses, No clubbing, cyanosis, or edema  Right inguinal hernia.  reducible                                  14.3   8.19  )-----------( 185      ( 15 Jul 2019 06:20 )             43.4     07-15    140  |  105  |  15  ----------------------------<  95  3.9   |  28  |  0.62    Ca    8.5      15 Jul 2019 06:20    TPro  6.3  /  Alb  3.4  /  TBili  0.8  /  DBili  x   /  AST  19  /  ALT  30  /  AlkPhos  58  07-14          Urinalysis Basic - ( 2019 14:23 )    Color: Yellow / Appearance: Clear / S.010 / pH: x  Gluc: x / Ketone: Negative  / Bili: Negative / Urobili: Negative mg/dL   Blood: x / Protein: Negative mg/dL / Nitrite: Negative   Leuk Esterase: Trace / RBC: 0-2 /HPF / WBC 3-5   Sq Epi: x / Non Sq Epi: Occasional / Bacteria: Few      PT/INR - ( 2019 13:32 )   PT: 12.3 sec;   INR: 1.12 ratio         PTT - ( 2019 13:32 )  PTT:31.1 sec        MEDICATIONS  (STANDING):  lisinopril 20 milliGRAM(s) Oral daily  pantoprazole    Tablet 40 milliGRAM(s) Oral before breakfast  sodium chloride 0.9%. 1000 milliLiter(s) (80 mL/Hr) IV Continuous <Continuous>    MEDICATIONS  (PRN):          RADIOLOGY RESULTS:

## 2019-07-15 NOTE — CONSULT NOTE ADULT - SUBJECTIVE AND OBJECTIVE BOX
CC.  Right inguinal pain  HPI.  Patient is 68 yo male with hx of HTN, and HLD presenting with right inguinal pain that has been going on for the past several day.  Patient saw his PCP Dr. Lopez today. Dr Lopez sent patient to [facility] for CT abdomen and pelvis with oral contrast. Patient present to ED with CT results from 19, IMPRESSION: large inguinal hernias, left >right. Right inguinal hernia contains loop of small bowel as well as fat. oral contrast is seen proximal and distal to hernia. The left inguinal hernia contains a portion  of the bladder proximally as well as loops of non-distended small bowel and fat (distal to the bladder). patient denies fever chills, n/v/d/c. Last BM this morning and was normal. NO urinary symptoms. No frequency, hesitancy or dysuria, normal urine flow.    Seens by surgery, and scheduled for hernia repair in the am    Constitutional: No fever, fatigue or weight loss.  Skin: No rash.  Eyes: No recent vision problems or eye pain.  ENT: No congestion, ear pain, or sore throat.  Endocrine: No thyroid problems.  Cardiovascular: No chest pain or palpation.  Respiratory: No cough, shortness of breath, congestion, or wheezing.  Gastrointestinal: No abdominal pain, nausea, vomiting, or diarrhea.  Genitourinary: No dysuria.  Musculoskeletal: No joint swelling.  Neurologic: No headache.       PAST MEDICAL/SURGICAL/FAMILY/SOCIAL HISTORY:    Past Medical History:  Abscess    Hypertension    Hypertension.  GERD  Medication  Prilosec 20 mg po daily  lisinopril 2o mg po daily    allergy UVALDO LONGO works as [employer] denies any ETOH/Tob/Illicit drug usage    Vital Signs Last 24 Hrs  T(C): 37 (2019 13:01), Max: 37 (2019 13:01)  T(F): 98.6 (2019 13:01), Max: 98.6 (2019 13:01)  HR: 100 (2019 13:) (100 - 100)  BP: 152/100 (2019 13:01) (152/100 - 152/100)  BP(mean): --  RR: 18 (2019 13:01) (18 - 18)  SpO2: 100% (2019 13:01) (100% - 100%)    PHYSICAL EXAM-  GENERAL: NAD, well-groomed, well-developed  HEAD:  Atraumatic, Normocephalic  EYES: EOMI, PERRLA, conjunctiva and sclera clear  NECK: Supple, No JVD, Normal thyroid  NERVOUS SYSTEM:  Alert & Oriented X3, Motor Strength 5/5 B/L upper and lower extremities; DTRs 2+ intact and symmetric  CHEST/LUNG: Clear to percussion bilaterally; No rales, rhonchi, wheezing, or rubs  HEART: Regular rate and rhythm; No murmurs, rubs, or gallops  ABDOMEN: Soft, Nontender, Nondistended; Bowel sounds present  EXTREMITIES:  2+ Peripheral Pulses, No clubbing, cyanosis, or edema  Right inguinal hernia.  reducible                              14.5   13.31 )-----------( 190      ( 2019 13:32 )             43.0     07-14    131<L>  |  99  |  21  ----------------------------<  118<H>  3.6   |  29  |  0.71    Ca    8.3<L>      2019 13:32    TPro  6.3  /  Alb  3.4  /  TBili  0.8  /  DBili  x   /  AST  19  /  ALT  30  /  AlkPhos  58  07-14          Urinalysis Basic - ( 2019 14:23 )    Color: Yellow / Appearance: Clear / S.010 / pH: x  Gluc: x / Ketone: Negative  / Bili: Negative / Urobili: Negative mg/dL   Blood: x / Protein: Negative mg/dL / Nitrite: Negative   Leuk Esterase: Trace / RBC: 0-2 /HPF / WBC 3-5   Sq Epi: x / Non Sq Epi: Occasional / Bacteria: Few      PT/INR - ( 2019 13:32 )   PT: 12.3 sec;   INR: 1.12 ratio         PTT - ( 2019 13:32 )  PTT:31.1 sec      Imaging Personally Reviewed:     [x ] YES  [ ] NO    Consultant(s) Notes Reviewed:  [x ] YES  [ ] NO    Care Discussed with Consultants/Other Providers [x ] YES  [ ] No medical contraindication for discharge
History of Present Illness: The patient is a 69 year old male with a history of HTN who presents with groin pain. He was found to have an incarcerated hernia and plan is for surgery. He has no other complaints. He denies chest pain, shortness of breath, dizziness, palpitations. No prior cardiac testing.    Past Medical/Surgical History:  HTN    Medications:  Home Medications:  lisinopril 20 mg oral tablet: 1 tab(s) orally once a day (14 Jul 2019 13:04)  PriLOSEC 40 mg oral delayed release capsule: 1 cap(s) orally once a day (14 Jul 2019 13:04)      Family History: Non-contributory family history of premature cardiovascular atherosclerotic disease    Social History: No tobacco, alcohol or drug use    Review of Systems:  General: No fevers, chills, weight loss or gain  Skin: No rashes, color changes  Cardiovascular: No chest pain, orthopnea  Respiratory: No shortness of breath, cough  Gastrointestinal: No nausea, abdominal pain  Genitourinary: No incontinence, pain with urination  Musculoskeletal: No pain, swelling, decreased range of motion  Neurological: No headache, weakness  Psychiatric: No depression, anxiety  Endocrine: No weight loss or gain, increased thirst  All other systems are comprehensively negative.    Physical Exam:  Vitals:        Vital Signs Last 24 Hrs  T(C): 36.7 (15 Jul 2019 07:09), Max: 37 (14 Jul 2019 13:01)  T(F): 98 (15 Jul 2019 07:09), Max: 98.6 (14 Jul 2019 13:01)  HR: 78 (15 Jul 2019 07:09) (68 - 100)  BP: 173/81 (15 Jul 2019 07:09) (152/100 - 173/81)  BP(mean): --  RR: 16 (15 Jul 2019 07:09) (16 - 18)  SpO2: 98% (15 Jul 2019 07:09) (97% - 100%)  General: NAD  HEENT: MMM  Neck: No JVD, no carotid bruit  Lungs: CTAB  CV: RRR, nl S1/S2, no M/R/G  Abdomen: S/NT/ND, +BS  Extremities: No LE edema, no cyanosis  Neuro: AAOx3, non-focal  Skin: No rash    Labs:                        14.3   8.19  )-----------( 185      ( 15 Jul 2019 06:20 )             43.4     07-15    140  |  105  |  15  ----------------------------<  95  3.9   |  28  |  0.62    Ca    8.5      15 Jul 2019 06:20    TPro  6.3  /  Alb  3.4  /  TBili  0.8  /  DBili  x   /  AST  19  /  ALT  30  /  AlkPhos  58  07-14        PT/INR - ( 14 Jul 2019 13:32 )   PT: 12.3 sec;   INR: 1.12 ratio         PTT - ( 14 Jul 2019 13:32 )  PTT:31.1 sec    ECG: NSR, normal axis, no ST abnormality

## 2019-07-15 NOTE — DISCHARGE NOTE PROVIDER - NSDCACTIVITY_GEN_ALL_CORE
No heavy lifting/straining/Do not make important decisions/Showering allowed/Do not drive or operate machinery

## 2019-07-15 NOTE — DISCHARGE NOTE PROVIDER - HOSPITAL COURSE
70 y/o male admitted through ER for incarcerated RIGHT hernia.  Seen by Dr. Haley who was able to reduce the hernia. Patient had uneventful surgical course. Following post op recovery, patient will be discharged with follow-p on Tuesday 7/23 with Dr. Haley. 70 y/o male admitted through ER for incarcerated RIGHT hernia.  Seen by Dr. Haley who was able to reduce the RIGHT inguinal hernia. Patient had uneventful surgical course. Following post op recovery, patient will be discharged with follow-p on Tuesday 7/23 with Dr. Haley. 70 y/o male admitted through ER for incarcerated RIGHT hernia.  Seen by Dr. Haley who was able to reduce the RIGHT inguinal hernia. Patient had uneventful surgical course, after RIGHT inguinal hernia was repaired with mesh patch and plug, and removal of large lipoma of cord. Following post op recovery, patient will be discharged with follow-up on Tuesday 7/23 with Dr. Haley. 70 y/o male admitted through ER for incarcerated RIGHT inguinal hernia.  Seen by Dr. Haley who was able to reduce the RIGHT inguinal hernia. Patient had uneventful surgical course, after RIGHT inguinal hernia was repaired with mesh patch and plug, and removal of large lipoma of cord. Following post op recovery, patient will be discharged with follow-up on Tuesday 7/23 with Dr. Haley.

## 2019-07-15 NOTE — DISCHARGE NOTE NURSING/CASE MANAGEMENT/SOCIAL WORK - NSDCDPATPORTLINK_GEN_ALL_CORE
You can access the "[employer] Patient Portal, offered by [facility], by registering with the following website: [email or web address]

## 2019-07-15 NOTE — PROGRESS NOTE ADULT - ASSESSMENT
Patient is 70 yo male presenting with     1. Inguinal hernia.  continue with pain management, and further care as per surgery  EKG shows NSR  Otherwise patient is still active.  walks daily w/o any symptoms  Cardiology consult for clearance    2. HTN. Continue with home medications, and Monitor BP      3. GERD Continue with PPI    Plan of care was discussed with patient  in great details, All questions were answered to their satisfication.  Seems to understand, and in agreement

## 2019-07-15 NOTE — BRIEF OPERATIVE NOTE - NSICDXBRIEFPROCEDURE_GEN_ALL_CORE_FT
PROCEDURES:  Excision, lipoma, spermatic cord 15-Jul-2019 13:26:03 Large lipoma RIGHT side Rika Miller  Repair, hernia, inguinal, open, using mesh, adult 15-Jul-2019 13:25:50 RIGHT side Rika Miller

## 2019-07-15 NOTE — DISCHARGE NOTE PROVIDER - NSDCFUADDINST_GEN_ALL_CORE_FT
Keep incision site dry    May shower after 48 hours.  Cover dressing with plastic to keep it dry when showering. DO NOT get dressing wet.  Dr. Haley will remove dressing in office    Follow all verbal and written instructions. Take medications as prescribed. DO NOT drive, operate machinery, and/or make important decisions while on prescription pain medication. DO NOT hesitate to call Doctor's office with questions or concerns. Certain prescription pain medication can cause constipation; take a stool softener such as Colace 100mg 3 x a day, to avoid the constipating effects of prescription pain medication.    For the next 24-48 hours while awake, alternate ice pack 15 minutes on & 15 minutes off.  Keep site dry. Keep incision site dry    May shower after 48 hours.  Cover dressing with plastic to keep it dry when showering. DO NOT get dressing wet.  Dr. Haley will remove dressing in office    Follow all verbal and written instructions. Take medications as prescribed. DO NOT drive, operate machinery, and/or make important decisions while on prescription pain medication. DO NOT hesitate to call Doctor's office with questions or concerns. Certain prescription pain medication can cause constipation; take a stool softener such as Colace 100mg 3 x a day, to avoid the constipating effects of prescription pain medication.    Oxycodone/acetaminophen prescription ordered for SEVERE pain if needed.    For MODERATE pain take Advil 200mg - 2 tabs with Tylenol 325 mg - 2 tabs, both every  4-6 hours    For the next 24-48 hours while awake, alternate ice pack 15 minutes on & 15 minutes off.  Keep site dry.

## 2019-07-15 NOTE — DISCHARGE NOTE PROVIDER - CARE PROVIDER_API CALL
Russ Haley)  [employer] [facility]  [address], [address]  [place] [address]  Phone: [phone]  Fax: [phone]  Follow Up Time:

## 2019-07-15 NOTE — DISCHARGE NOTE PROVIDER - NSDCCPTREATMENT_GEN_ALL_CORE_FT
PRINCIPAL PROCEDURE  Procedure: Repair, hernia, inguinal, open, using mesh, adult  Findings and Treatment: RIGHT side - with excision of large lipoma of RIGHT cord

## 2019-07-15 NOTE — CONSULT NOTE ADULT - ASSESSMENT
Patient is 68 yo male presenting with     1. Inguinal hernia.  continue with pain management, and further care as per surgery  EKG for medical clearance  Otherwise patient is still active.  walks daily w/o any symptoms    2. HTN. Continue with home medications, and Monitor BP      3. GERD Continue with PPI    Plan of care was discussed with patient  in great details, All questions were answered to their satisfication.  Seems to understand, and in agreement
The patient is a 69 year old male with a history of HTN who is admitted with incarcerated hernia.    Plan:  - ECG with no evidence of ischemia/infarction  - Continue lisinopril 20 mg daily  - Add prn hydralazine  - The patient is asymptomatic from a cardiac standpoint and there are no active cardiac issues. He is low/intermediate risk for cardiac events for an intermediate risk surgery. He is optimized to proceed without additional cardiac testing.

## 2019-07-15 NOTE — DISCHARGE NOTE PROVIDER - NSDCCPCAREPLAN_GEN_ALL_CORE_FT
PRINCIPAL DISCHARGE DIAGNOSIS  Diagnosis: Right inguinal hernia  Assessment and Plan of Treatment: PRINCIPAL DISCHARGE DIAGNOSIS  Diagnosis: Right inguinal hernia  Assessment and Plan of Treatment: Large lipoma of cord

## 2019-07-16 ENCOUNTER — RX RENEWAL (OUTPATIENT)
Age: 69
End: 2019-07-16

## 2019-07-24 ENCOUNTER — APPOINTMENT (OUTPATIENT)
Dept: INTERNAL MEDICINE | Facility: CLINIC | Age: 69
End: 2019-07-24
Payer: MEDICARE

## 2019-07-24 VITALS
BODY MASS INDEX: 25.88 KG/M2 | OXYGEN SATURATION: 97 % | HEART RATE: 86 BPM | RESPIRATION RATE: 14 BRPM | WEIGHT: 161 LBS | TEMPERATURE: 98 F | SYSTOLIC BLOOD PRESSURE: 160 MMHG | HEIGHT: 66 IN | DIASTOLIC BLOOD PRESSURE: 90 MMHG

## 2019-07-24 PROCEDURE — 99495 TRANSJ CARE MGMT MOD F2F 14D: CPT

## 2019-07-24 NOTE — PHYSICAL EXAM
[de-identified] : well healing right inguinal hernia surgical site [Coordination Grossly Intact] : coordination grossly intact [Normal] : soft, non-tender, non-distended, no masses palpated, no HSM and normal bowel sounds [de-identified] : moderate kyphosis of the spine

## 2019-07-24 NOTE — HISTORY OF PRESENT ILLNESS
[Post-hospitalization from ___ Hospital] : Post-hospitalization from [unfilled] Hospital [Discharged on ___] : discharged on [unfilled] [Admitted on: ___] : The patient was admitted on [unfilled] [Patient Contacted By: ____] : and contacted by [unfilled] [FreeTextEntry2] : Pt went to [facility] on 7/14 and needed hernia surgery. He was noted to have multiple thoracolumbar compression fxs on ct of abd/pelvis. He denies any pain.

## 2019-08-05 ENCOUNTER — MEDICATION RENEWAL (OUTPATIENT)
Age: 69
End: 2019-08-05

## 2019-09-09 ENCOUNTER — RX RENEWAL (OUTPATIENT)
Age: 69
End: 2019-09-09

## 2019-09-24 ENCOUNTER — RX RENEWAL (OUTPATIENT)
Age: 69
End: 2019-09-24

## 2019-10-07 ENCOUNTER — APPOINTMENT (OUTPATIENT)
Dept: INTERNAL MEDICINE | Facility: CLINIC | Age: 69
End: 2019-10-07
Payer: MEDICARE

## 2019-10-07 VITALS — SYSTOLIC BLOOD PRESSURE: 200 MMHG | DIASTOLIC BLOOD PRESSURE: 118 MMHG

## 2019-10-07 VITALS
OXYGEN SATURATION: 99 % | SYSTOLIC BLOOD PRESSURE: 190 MMHG | HEIGHT: 66 IN | TEMPERATURE: 98 F | RESPIRATION RATE: 14 BRPM | HEART RATE: 88 BPM | DIASTOLIC BLOOD PRESSURE: 110 MMHG

## 2019-10-07 PROCEDURE — 99214 OFFICE O/P EST MOD 30 MIN: CPT

## 2019-10-07 NOTE — PHYSICAL EXAM
[Normal Sclera/Conjunctiva] : normal sclera/conjunctiva [PERRL] : pupils equal round and reactive to light [EOMI] : extraocular movements intact [No Edema] : there was no peripheral edema [Normal Affect] : the affect was normal [Normal] : soft, non-tender, non-distended, no masses palpated, no HSM and normal bowel sounds [Normal Insight/Judgement] : insight and judgment were intact

## 2019-10-07 NOTE — HISTORY OF PRESENT ILLNESS
[de-identified] : c/o his BP being high. He went to urgent care today for a cough. His BP was very high. He has been coughing for 3 days. He was given rx for prednisone 10 mg 2 daily for 5 days. His BP was 194/109 at [facility] today and it was 182/116 9/28/19. He feels a little dizzy. He took tylenol today for headache. No chest pain or sob. He was in [facility] today and sent her to be seen.  [FreeTextEntry1] : BP check

## 2019-10-08 LAB
25(OH)D3 SERPL-MCNC: 44.3 NG/ML
ALBUMIN SERPL ELPH-MCNC: 4.4 G/DL
ALP BLD-CCNC: 69 U/L
ALT SERPL-CCNC: 29 U/L
ANION GAP SERPL CALC-SCNC: 15 MMOL/L
APPEARANCE: CLEAR
AST SERPL-CCNC: 26 U/L
B2 MICROGLOB SERPL-MCNC: 2 MG/L
BASOPHILS # BLD AUTO: 0.06 K/UL
BASOPHILS NFR BLD AUTO: 0.6 %
BILIRUB SERPL-MCNC: 0.4 MG/DL
BILIRUBIN URINE: NEGATIVE
BLOOD URINE: NEGATIVE
BUN SERPL-MCNC: 29 MG/DL
CALCIUM SERPL-MCNC: 9 MG/DL
CHLORIDE SERPL-SCNC: 103 MMOL/L
CHOLEST SERPL-MCNC: 233 MG/DL
CHOLEST/HDLC SERPL: 3.4 RATIO
CO2 SERPL-SCNC: 22 MMOL/L
COLOR: COLORLESS
CREAT SERPL-MCNC: 0.79 MG/DL
EOSINOPHIL # BLD AUTO: 0.29 K/UL
EOSINOPHIL NFR BLD AUTO: 2.8 %
ESTIMATED AVERAGE GLUCOSE: 120 MG/DL
FOLATE SERPL-MCNC: 12.4 NG/ML
GLUCOSE QUALITATIVE U: NEGATIVE
GLUCOSE SERPL-MCNC: 104 MG/DL
HBA1C MFR BLD HPLC: 5.8 %
HCT VFR BLD CALC: 48.5 %
HDLC SERPL-MCNC: 68 MG/DL
HGB BLD-MCNC: 15.6 G/DL
IMM GRANULOCYTES NFR BLD AUTO: 0.6 %
KETONES URINE: NEGATIVE
LDLC SERPL CALC-MCNC: 147 MG/DL
LEUKOCYTE ESTERASE URINE: NEGATIVE
LYMPHOCYTES # BLD AUTO: 1.15 K/UL
LYMPHOCYTES NFR BLD AUTO: 11.2 %
MAN DIFF?: NORMAL
MCHC RBC-ENTMCNC: 31.3 PG
MCHC RBC-ENTMCNC: 32.2 GM/DL
MCV RBC AUTO: 97.2 FL
MONOCYTES # BLD AUTO: 0.19 K/UL
MONOCYTES NFR BLD AUTO: 1.9 %
NEUTROPHILS # BLD AUTO: 8.52 K/UL
NEUTROPHILS NFR BLD AUTO: 82.9 %
NITRITE URINE: NEGATIVE
PH URINE: 6
PLATELET # BLD AUTO: 191 K/UL
POTASSIUM SERPL-SCNC: 4.1 MMOL/L
PROT SERPL-MCNC: 6.2 G/DL
PROTEIN URINE: NEGATIVE
PSA SERPL-MCNC: 113 NG/ML
RBC # BLD: 4.99 M/UL
RBC # FLD: 14.3 %
SODIUM SERPL-SCNC: 139 MMOL/L
SPECIFIC GRAVITY URINE: 1.01
TRIGL SERPL-MCNC: 89 MG/DL
TSH SERPL-ACNC: 2.61 UIU/ML
URATE SERPL-MCNC: 3.8 MG/DL
UROBILINOGEN URINE: NORMAL
VIT B12 SERPL-MCNC: 531 PG/ML
WBC # FLD AUTO: 10.27 K/UL

## 2019-10-09 LAB
ALBUMIN MFR SERPL ELPH: 63.6 %
ALBUMIN SERPL-MCNC: 3.9 G/DL
ALBUMIN/GLOB SERPL: 1.7 RATIO
ALPHA1 GLOB MFR SERPL ELPH: 5.1 %
ALPHA1 GLOB SERPL ELPH-MCNC: 0.3 G/DL
ALPHA2 GLOB MFR SERPL ELPH: 11.4 %
ALPHA2 GLOB SERPL ELPH-MCNC: 0.7 G/DL
B-GLOBULIN MFR SERPL ELPH: 12.4 %
B-GLOBULIN SERPL ELPH-MCNC: 0.8 G/DL
DEPRECATED KAPPA LC FREE/LAMBDA SER: 2.55 RATIO
GAMMA GLOB FLD ELPH-MCNC: 0.5 G/DL
GAMMA GLOB MFR SERPL ELPH: 7.5 %
INTERPRETATION SERPL IEP-IMP: NORMAL
KAPPA LC CSF-MCNC: 0.4 MG/DL
KAPPA LC SERPL-MCNC: 1.02 MG/DL
PROT SERPL-MCNC: 6.2 G/DL
PROT SERPL-MCNC: 6.2 G/DL

## 2019-10-10 ENCOUNTER — APPOINTMENT (OUTPATIENT)
Dept: INTERNAL MEDICINE | Facility: CLINIC | Age: 69
End: 2019-10-10
Payer: MEDICARE

## 2019-10-10 VITALS — DIASTOLIC BLOOD PRESSURE: 100 MMHG | SYSTOLIC BLOOD PRESSURE: 178 MMHG

## 2019-10-10 VITALS
TEMPERATURE: 97.8 F | BODY MASS INDEX: 25.39 KG/M2 | RESPIRATION RATE: 14 BRPM | HEART RATE: 102 BPM | OXYGEN SATURATION: 97 % | SYSTOLIC BLOOD PRESSURE: 144 MMHG | HEIGHT: 66 IN | WEIGHT: 158 LBS | DIASTOLIC BLOOD PRESSURE: 82 MMHG

## 2019-10-10 VITALS — SYSTOLIC BLOOD PRESSURE: 178 MMHG | DIASTOLIC BLOOD PRESSURE: 100 MMHG

## 2019-10-10 PROCEDURE — 99214 OFFICE O/P EST MOD 30 MIN: CPT

## 2019-10-10 RX ORDER — MELOXICAM 15 MG/1
15 TABLET ORAL
Qty: 20 | Refills: 0 | Status: DISCONTINUED | COMMUNITY
Start: 2018-08-27 | End: 2019-10-10

## 2019-10-10 NOTE — HISTORY OF PRESENT ILLNESS
[FreeTextEntry1] : BP check [de-identified] : Pt is here to recheck his BP. He is taking 3 meds now for BP. He feels good except for some allergy and sinus congestion. He is eating better and drinks green tea to make his cholesterol go down. The readings at [facility] for BP are 140-145/.

## 2019-10-14 ENCOUNTER — RX RENEWAL (OUTPATIENT)
Age: 69
End: 2019-10-14

## 2019-10-21 ENCOUNTER — RX RENEWAL (OUTPATIENT)
Age: 69
End: 2019-10-21

## 2019-10-21 ENCOUNTER — OTHER (OUTPATIENT)
Age: 69
End: 2019-10-21

## 2019-11-06 ENCOUNTER — APPOINTMENT (OUTPATIENT)
Dept: COLORECTAL SURGERY | Facility: CLINIC | Age: 69
End: 2019-11-06
Payer: MEDICARE

## 2019-11-06 VITALS
DIASTOLIC BLOOD PRESSURE: 93 MMHG | WEIGHT: 158 LBS | HEIGHT: 66 IN | TEMPERATURE: 97.6 F | BODY MASS INDEX: 25.39 KG/M2 | HEART RATE: 93 BPM | SYSTOLIC BLOOD PRESSURE: 140 MMHG

## 2019-11-06 PROCEDURE — 46221 LIGATION OF HEMORRHOID(S): CPT

## 2019-11-06 PROCEDURE — 99214 OFFICE O/P EST MOD 30 MIN: CPT | Mod: 25

## 2019-11-07 NOTE — HISTORY OF PRESENT ILLNESS
[FreeTextEntry1] : 69-year-old male with a history of prolapsing bleeding internal hemorrhoids presented for followup. He isn't complaining of significant rectal bleeding which is painless. He's had previous banding procedures done with good outcomes.

## 2019-11-07 NOTE — ASSESSMENT
[FreeTextEntry1] : Mr. De Paz presents to the office with rectal bleeding after his BMs. Anoscopy in office reveals inflamed internal hemorrhoids. Discussed with patient that risks of procedure include but not limited to bleeding, infection, urinary retention, and less likely pelvic sepsis. After informed consent was obtained, anoscopy was performed with a lighted anoscope which demonstrated multiple large grade 3 internal hemorrhoids. Rubber band ligation procedure was done to 2 internal hemorrhoids above the dentate line without any complications. Patient was given post rubber band ligation instructions The patient tolerated the procedure well and was advised to perform sitz baths for discomfort. Patient advised to call the office if experiencing fever, pain or trouble urinating.\par He'll followup in 3 weeks\par

## 2019-11-07 NOTE — PHYSICAL EXAM
[Reduce Spontaneously] : a spontaneously reducible (grade II) [Tender, Swollen] : tender, swollen [Normal] : was normal [Respiratory Effort] : normal respiratory effort [Normal Rate and Rhythm] : normal rate and rhythm [Alert] : alert [No Rash or Lesion] : No rash or lesion [Oriented to Person] : oriented to person [Oriented to Place] : oriented to place [Calm] : calm [Abdomen Masses] : No abdominal masses [Tender] : nontender [Thrombosed] : that was not thrombosed [JVD] : no jugular venous distention  [de-identified] : nad [de-identified] :  normocephalic, atraumatic.

## 2019-11-10 ENCOUNTER — EMERGENCY (EMERGENCY)
Facility: HOSPITAL | Age: 69
LOS: 1 days | Discharge: HOME | End: 2019-11-10
Attending: EMERGENCY MEDICINE | Admitting: EMERGENCY MEDICINE
Payer: MEDICARE

## 2019-11-10 VITALS
OXYGEN SATURATION: 98 % | DIASTOLIC BLOOD PRESSURE: 87 MMHG | RESPIRATION RATE: 16 BRPM | TEMPERATURE: 98 F | HEART RATE: 88 BPM | SYSTOLIC BLOOD PRESSURE: 137 MMHG | WEIGHT: 160.06 LBS | HEIGHT: 66 IN

## 2019-11-10 VITALS — HEART RATE: 78 BPM | SYSTOLIC BLOOD PRESSURE: 118 MMHG | DIASTOLIC BLOOD PRESSURE: 78 MMHG | RESPIRATION RATE: 18 BRPM

## 2019-11-10 LAB
ALBUMIN SERPL ELPH-MCNC: 3.6 G/DL — SIGNIFICANT CHANGE UP (ref 3.3–5)
ALP SERPL-CCNC: 53 U/L — SIGNIFICANT CHANGE UP (ref 30–120)
ALT FLD-CCNC: 27 U/L DA — SIGNIFICANT CHANGE UP (ref 10–60)
ANION GAP SERPL CALC-SCNC: 9 MMOL/L — SIGNIFICANT CHANGE UP (ref 5–17)
APTT BLD: 28.7 SEC — SIGNIFICANT CHANGE UP (ref 28.5–37)
AST SERPL-CCNC: 17 U/L — SIGNIFICANT CHANGE UP (ref 10–40)
BASOPHILS # BLD AUTO: 0.07 K/UL — SIGNIFICANT CHANGE UP (ref 0–0.2)
BASOPHILS NFR BLD AUTO: 0.6 % — SIGNIFICANT CHANGE UP (ref 0–2)
BILIRUB SERPL-MCNC: 0.5 MG/DL — SIGNIFICANT CHANGE UP (ref 0.2–1.2)
BUN SERPL-MCNC: 36 MG/DL — HIGH (ref 7–23)
CALCIUM SERPL-MCNC: 8.7 MG/DL — SIGNIFICANT CHANGE UP (ref 8.4–10.5)
CHLORIDE SERPL-SCNC: 100 MMOL/L — SIGNIFICANT CHANGE UP (ref 96–108)
CO2 SERPL-SCNC: 27 MMOL/L — SIGNIFICANT CHANGE UP (ref 22–31)
CREAT SERPL-MCNC: 0.83 MG/DL — SIGNIFICANT CHANGE UP (ref 0.5–1.3)
EOSINOPHIL # BLD AUTO: 0.22 K/UL — SIGNIFICANT CHANGE UP (ref 0–0.5)
EOSINOPHIL NFR BLD AUTO: 1.9 % — SIGNIFICANT CHANGE UP (ref 0–6)
GLUCOSE SERPL-MCNC: 99 MG/DL — SIGNIFICANT CHANGE UP (ref 70–99)
HCT VFR BLD CALC: 44.6 % — SIGNIFICANT CHANGE UP (ref 39–50)
HGB BLD-MCNC: 14.9 G/DL — SIGNIFICANT CHANGE UP (ref 13–17)
IMM GRANULOCYTES NFR BLD AUTO: 1.1 % — SIGNIFICANT CHANGE UP (ref 0–1.5)
INR BLD: 1.08 RATIO — SIGNIFICANT CHANGE UP (ref 0.88–1.16)
LYMPHOCYTES # BLD AUTO: 28.1 % — SIGNIFICANT CHANGE UP (ref 13–44)
LYMPHOCYTES # BLD AUTO: 3.21 K/UL — SIGNIFICANT CHANGE UP (ref 1–3.3)
MCHC RBC-ENTMCNC: 30.7 PG — SIGNIFICANT CHANGE UP (ref 27–34)
MCHC RBC-ENTMCNC: 33.4 GM/DL — SIGNIFICANT CHANGE UP (ref 32–36)
MCV RBC AUTO: 92 FL — SIGNIFICANT CHANGE UP (ref 80–100)
MONOCYTES # BLD AUTO: 0.87 K/UL — SIGNIFICANT CHANGE UP (ref 0–0.9)
MONOCYTES NFR BLD AUTO: 7.6 % — SIGNIFICANT CHANGE UP (ref 2–14)
NEUTROPHILS # BLD AUTO: 6.93 K/UL — SIGNIFICANT CHANGE UP (ref 1.8–7.4)
NEUTROPHILS NFR BLD AUTO: 60.7 % — SIGNIFICANT CHANGE UP (ref 43–77)
NRBC # BLD: 0 /100 WBCS — SIGNIFICANT CHANGE UP (ref 0–0)
PLATELET # BLD AUTO: 215 K/UL — SIGNIFICANT CHANGE UP (ref 150–400)
POTASSIUM SERPL-MCNC: 3.7 MMOL/L — SIGNIFICANT CHANGE UP (ref 3.5–5.3)
POTASSIUM SERPL-SCNC: 3.7 MMOL/L — SIGNIFICANT CHANGE UP (ref 3.5–5.3)
PROT SERPL-MCNC: 6.4 G/DL — SIGNIFICANT CHANGE UP (ref 6–8.3)
PROTHROM AB SERPL-ACNC: 11.8 SEC — SIGNIFICANT CHANGE UP (ref 10–12.9)
RBC # BLD: 4.85 M/UL — SIGNIFICANT CHANGE UP (ref 4.2–5.8)
RBC # FLD: 13.3 % — SIGNIFICANT CHANGE UP (ref 10.3–14.5)
SODIUM SERPL-SCNC: 136 MMOL/L — SIGNIFICANT CHANGE UP (ref 135–145)
WBC # BLD: 11.43 K/UL — HIGH (ref 3.8–10.5)
WBC # FLD AUTO: 11.43 K/UL — HIGH (ref 3.8–10.5)

## 2019-11-10 PROCEDURE — 85610 PROTHROMBIN TIME: CPT

## 2019-11-10 PROCEDURE — 99284 EMERGENCY DEPT VISIT MOD MDM: CPT | Mod: 25

## 2019-11-10 PROCEDURE — 74177 CT ABD & PELVIS W/CONTRAST: CPT

## 2019-11-10 PROCEDURE — 85730 THROMBOPLASTIN TIME PARTIAL: CPT

## 2019-11-10 PROCEDURE — 99284 EMERGENCY DEPT VISIT MOD MDM: CPT

## 2019-11-10 PROCEDURE — 80053 COMPREHEN METABOLIC PANEL: CPT

## 2019-11-10 PROCEDURE — 85027 COMPLETE CBC AUTOMATED: CPT

## 2019-11-10 PROCEDURE — 74177 CT ABD & PELVIS W/CONTRAST: CPT | Mod: 26

## 2019-11-10 PROCEDURE — 36415 COLL VENOUS BLD VENIPUNCTURE: CPT

## 2019-11-10 RX ORDER — HYDROCORTISONE 1 %
1 OINTMENT (GRAM) TOPICAL
Qty: 28 | Refills: 0
Start: 2019-11-10 | End: 2019-11-23

## 2019-11-10 RX ORDER — SODIUM CHLORIDE 9 MG/ML
3 INJECTION INTRAMUSCULAR; INTRAVENOUS; SUBCUTANEOUS ONCE
Refills: 0 | Status: COMPLETED | OUTPATIENT
Start: 2019-11-10 | End: 2019-11-10

## 2019-11-10 RX ADMIN — SODIUM CHLORIDE 3 MILLILITER(S): 9 INJECTION INTRAMUSCULAR; INTRAVENOUS; SUBCUTANEOUS at 15:49

## 2019-11-10 NOTE — ED ADULT NURSE NOTE - ED STAT RN HANDOFF DETAILS
Endorse pt to next shift RN. Awaiting reevaluation and disposition. Awaiting Cat Scan abd and pelvis.

## 2019-11-10 NOTE — ED PROVIDER NOTE - GASTROINTESTINAL, MLM
+Small non thrombosed external +hemorrhoids. No active bleeding. Dry blood around area of rectum. +Small non thrombosed external +hemorrhoids. No active bleeding. Dry blood around area of rectum.  No significant internal hemorrhoid noted on exam

## 2019-11-10 NOTE — ED PROVIDER NOTE - OBJECTIVE STATEMENT
68 y/o male with a PMHx of abscess, htn, and gerd, presents to the ED c/o hemorrhoids bleeding for 4 days. Pt went to see his provider, and found 4 hemorrhoids. Denies lightheadedness, diarrhea, constipation, or other sx. Never had a colonoscopy. Provider: Dr. Rey. No other complaints at this time. 68 y/o male with a PMHx of abscess, htn, and gerd, presents to the ED c/o rectal bleeding for 4 days. Reports that he has had prior similar episodes of rectal bleeding and that those episodes were related to hemorrhoids which he had banded. Only notes the bleeding after having a bowel movement and reports that it is bright red.  Pt has not followed with GI.   Denies lightheadedness, diarrhea, constipation, N/V, CP, SOB, abd pain. Never had a colonoscopy. Provider: Dr. Rey. No other complaints at this time.  Pt is not on blood thinners

## 2019-11-10 NOTE — ED ADULT NURSE NOTE - CHPI ED NUR SYMPTOMS NEG
no fever/no nausea/no dizziness/no vomiting/no chills/no weakness/no decreased eating/drinking/no tingling

## 2019-11-10 NOTE — ED PROVIDER NOTE - CARE PROVIDER_API CALL
[employer] ()  Internal Medicine  [address] [place] [address]  Phone: [phone]  Fax: [phone]  Follow Up Time: 4-6 Days

## 2019-11-10 NOTE — ED ADULT NURSE NOTE - PMH
Abscess    Hypertension    Hypertension Abscess    GERD (gastroesophageal reflux disease)    Hypertension    Hypertension

## 2019-11-10 NOTE — ED ADULT NURSE NOTE - NSIMPLEMENTINTERV_GEN_ALL_ED
Implemented All Universal Safety Interventions:  [place] to call system. Call bell, personal items and telephone within reach. Instruct patient to call for assistance. Room bathroom lighting operational. Non-slip footwear when patient is off stretcher. Physically safe environment: no spills, clutter or unnecessary equipment. Stretcher in lowest position, wheels locked, appropriate side rails in place.

## 2019-11-10 NOTE — ED PROVIDER NOTE - CLINICAL SUMMARY MEDICAL DECISION MAKING FREE TEXT BOX
Concern for possible GI bleed versus high bleed. Plan: Will obtain labs, CT scan, and monitor. Concern for possible GI bleed versus high hemorrhoid bleed. Plan: Will obtain labs, CT scan, and monitor. Advised that bleeding could still be secondary to internal hemorrhoids not noted on exam, stable at this time

## 2019-11-10 NOTE — ED PROVIDER NOTE - PATIENT PORTAL LINK FT
You can access the FollowMyHealth Patient Portal offered by [facility] by registering at the following website: [email or web address]. By joining [employer]’s FollowMyHealth portal, you will also be able to view your health information using other applications (apps) compatible with our system.

## 2019-11-11 PROBLEM — K21.9 GASTRO-ESOPHAGEAL REFLUX DISEASE WITHOUT ESOPHAGITIS: Chronic | Status: ACTIVE | Noted: 2019-11-10

## 2019-11-13 ENCOUNTER — APPOINTMENT (OUTPATIENT)
Dept: COLORECTAL SURGERY | Facility: CLINIC | Age: 69
End: 2019-11-13
Payer: MEDICARE

## 2019-11-13 VITALS
DIASTOLIC BLOOD PRESSURE: 99 MMHG | WEIGHT: 158 LBS | HEIGHT: 66 IN | RESPIRATION RATE: 14 BRPM | TEMPERATURE: 97.9 F | HEART RATE: 93 BPM | BODY MASS INDEX: 25.39 KG/M2 | SYSTOLIC BLOOD PRESSURE: 180 MMHG

## 2019-11-13 PROCEDURE — 99214 OFFICE O/P EST MOD 30 MIN: CPT | Mod: 25

## 2019-11-13 PROCEDURE — 46221 LIGATION OF HEMORRHOID(S): CPT

## 2019-11-13 NOTE — HISTORY OF PRESENT ILLNESS
[FreeTextEntry1] : Mr. King presents to the office for follow up. Approximately one week earlier, he was seen in office by Dr. Chow for rectal bleeding and underwent rubber band ligation. Five days later, because of continued rectal bleeding, he sought urgent evaluation at [place] ED. At that visit, his hemoglobin was noted to be 16 and a CTA A/P was negative for any evidence of active bleeding. Since that visit 2 days earlier, he reports resolution of rectal bleeding. After the ED visit, I did speak personally with the patient's daughter over the telephone regarding his hemorrhoidal disease as well as options for ongoing treatment and care. We had tentatively agreed that surgical treatment may be warranted, and she was to accompany him to the office visit to help facilitate this, however, she is not with him today at time of evaluation.\par He states that his last colonoscopy was recent, within the last year.

## 2019-11-13 NOTE — PHYSICAL EXAM
[Normal rectal exam] : exam was normal [Reduce Spontaneously] : a spontaneously reducible (grade II) [Normal] : was normal [None] : there was no rectal abscess [Alert] : alert [No Rash or Lesion] : No rash or lesion [Oriented to Place] : oriented to place [Oriented to Person] : oriented to person [Oriented to Time] : oriented to time [Calm] : calm [de-identified] : No apparent distress [Skin Tags] : there were no residual hemorrhoidal skin tags seen [de-identified] : Normocephalic atraumatic [de-identified] : Moving all extremities x4

## 2019-11-13 NOTE — ASSESSMENT
[FreeTextEntry1] : Mr. De Paz presented to the office for followup after ED evaluation for rectal bleeding. In office today, he underwent additional rubber band ligation to the posterior column. He was advised to expect residual rectal bleeding for the following week, and if persistent or worsening, to return to the office for evaluation rather than return to the ED. He is requesting creams and suppositories, and I will renew these. At this juncture, he does not appear willing to proceed with surgery, and I am not willing to proceed with discussion and scheduling of surgery unless a family member is present to help guide and facilitate this. He is to followup if and when symptomatic in the near future.

## 2019-11-18 ENCOUNTER — APPOINTMENT (OUTPATIENT)
Dept: COLORECTAL SURGERY | Facility: CLINIC | Age: 69
End: 2019-11-18

## 2019-11-19 ENCOUNTER — APPOINTMENT (OUTPATIENT)
Dept: COLORECTAL SURGERY | Facility: CLINIC | Age: 69
End: 2019-11-19

## 2019-11-19 ENCOUNTER — APPOINTMENT (OUTPATIENT)
Dept: COLORECTAL SURGERY | Facility: CLINIC | Age: 69
End: 2019-11-19
Payer: MEDICARE

## 2019-11-19 VITALS
HEART RATE: 130 BPM | TEMPERATURE: 97.7 F | HEIGHT: 66 IN | SYSTOLIC BLOOD PRESSURE: 149 MMHG | WEIGHT: 158 LBS | BODY MASS INDEX: 25.39 KG/M2 | DIASTOLIC BLOOD PRESSURE: 95 MMHG | RESPIRATION RATE: 14 BRPM

## 2019-11-19 DIAGNOSIS — Z12.11 ENCOUNTER FOR SCREENING FOR MALIGNANT NEOPLASM OF COLON: ICD-10-CM

## 2019-11-19 PROCEDURE — 99215 OFFICE O/P EST HI 40 MIN: CPT | Mod: 25

## 2019-11-19 PROCEDURE — 46221 LIGATION OF HEMORRHOID(S): CPT

## 2019-11-19 NOTE — ASSESSMENT
[FreeTextEntry1] : 69-year-old male with an extensive history of perirectal bleeding presents for followup. Exam, he has large internal hemorrhoids. However he continues to have large volume per blood per rectum and has never had a colonoscopy because he has refused until now. Recommend colonoscopy at this time.\par \par The risks/benefits/alternatives for a colonoscopy were discussed. The risks and benefits of colorectal cancer screening with colonoscopy was discussed including, but not limited to, bleeding, infection, perforation of colon, injury to surrounding organs and missed polyp or cancer. These include a less than 1% risk of bleeding should any polyps be biopsied and/or removed. There is also a less than 0.1% risk of perforation. The patient understands the need to adhere to a clear liquid diet the day prior to procedure as well as having to perform a bowel prep (clenipiq) in order to allow for adequate visualization of the mucosal surfaces. Followup colonoscopies will be scheduled based on the findings that are seen at the time of the procedure. Patient understands and is agreeable, and will proceed with consent and scheduling.\par \par \par Patient will need the following preoperative testings: \par has recent lab\par \par Patient will need the following preoperative risk assessment evaluation:\par internal medicine\par

## 2019-11-19 NOTE — PHYSICAL EXAM
[Nonprolapsing] : a nonprolapsing (grade I) [Normal Rate and Rhythm] : normal rate and rhythm [Respiratory Effort] : normal respiratory effort [No Rash or Lesion] : No rash or lesion [Alert] : alert [Oriented to Person] : oriented to person [Calm] : calm [Oriented to Place] : oriented to place [Abdomen Masses] : No abdominal masses [JVD] : no jugular venous distention  [Tender, Swollen] : nontender, non-swollen [Tender] : nontender [de-identified] :  normocephalic, atraumatic. [de-identified] : NAD

## 2019-11-19 NOTE — HISTORY OF PRESENT ILLNESS
[FreeTextEntry1] : 69-year-old male presented for followup for rectal bleeding. He's been treated multiple times with rubber band ligation as well as suppositories. He continues to have significant red blood per rectum. The patient never had a colonoscopy.

## 2019-11-20 ENCOUNTER — APPOINTMENT (OUTPATIENT)
Dept: INTERNAL MEDICINE | Facility: CLINIC | Age: 69
End: 2019-11-20
Payer: MEDICARE

## 2019-11-20 ENCOUNTER — NON-APPOINTMENT (OUTPATIENT)
Age: 69
End: 2019-11-20

## 2019-11-20 VITALS
WEIGHT: 16 LBS | TEMPERATURE: 97.9 F | BODY MASS INDEX: 2.57 KG/M2 | RESPIRATION RATE: 14 BRPM | DIASTOLIC BLOOD PRESSURE: 98 MMHG | OXYGEN SATURATION: 97 % | SYSTOLIC BLOOD PRESSURE: 148 MMHG | HEIGHT: 66 IN | HEART RATE: 109 BPM

## 2019-11-20 VITALS — BODY MASS INDEX: 30.03 KG/M2 | HEIGHT: 60.5 IN | WEIGHT: 157 LBS

## 2019-11-20 DIAGNOSIS — K62.5 HEMORRHAGE OF ANUS AND RECTUM: ICD-10-CM

## 2019-11-20 DIAGNOSIS — Z86.79 PERSONAL HISTORY OF OTHER DISEASES OF THE CIRCULATORY SYSTEM: ICD-10-CM

## 2019-11-20 PROCEDURE — 99214 OFFICE O/P EST MOD 30 MIN: CPT | Mod: 25

## 2019-11-20 PROCEDURE — 93000 ELECTROCARDIOGRAM COMPLETE: CPT

## 2019-11-20 RX ORDER — OXYCODONE AND ACETAMINOPHEN 5; 325 MG/1; MG/1
5-325 TABLET ORAL
Qty: 12 | Refills: 0 | Status: DISCONTINUED | COMMUNITY
Start: 2019-07-15 | End: 2019-11-20

## 2019-11-20 RX ORDER — PREDNISONE 10 MG/1
10 TABLET ORAL
Qty: 10 | Refills: 0 | Status: DISCONTINUED | COMMUNITY
Start: 2019-10-07 | End: 2019-11-20

## 2019-11-20 RX ORDER — HYDROCORTISONE ACETATE 25 MG/1
25 SUPPOSITORY RECTAL
Qty: 10 | Refills: 0 | Status: DISCONTINUED | COMMUNITY
Start: 2019-11-13 | End: 2019-11-20

## 2019-11-20 NOTE — PHYSICAL EXAM
[No Edema] : there was no peripheral edema [Normal] : soft, non-tender, non-distended, no masses palpated, no HSM and normal bowel sounds [No Rash] : no rash [Coordination Grossly Intact] : coordination grossly intact [de-identified] : kyphosis

## 2019-11-20 NOTE — HISTORY OF PRESENT ILLNESS
[No Pertinent Cardiac History] : no history of aortic stenosis, atrial fibrillation, coronary artery disease, recent myocardial infarction, or implantable device/pacemaker [No Pertinent Pulmonary History] : no history of asthma, COPD, sleep apnea, or smoking [No Adverse Anesthesia Reaction] : no adverse anesthesia reaction in self or family member [(Patient denies any chest pain, claudication, dyspnea on exertion, orthopnea, palpitations or syncope)] : Patient denies any chest pain, claudication, dyspnea on exertion, orthopnea, palpitations or syncope [Chronic Anticoagulation] : no chronic anticoagulation [Chronic Kidney Disease] : no chronic kidney disease [Diabetes] : no diabetes [FreeTextEntry1] : colonoscopy [FreeTextEntry2] : 11/20/19 [FreeTextEntry4] : Pt c/o cold symptoms and postnasal drip for a few days. He was coughing all night. Has some mucus. He is requesting something for cough and antibiotic if symptoms persist.

## 2019-11-21 ENCOUNTER — RESULT REVIEW (OUTPATIENT)
Age: 69
End: 2019-11-21

## 2019-12-04 ENCOUNTER — MEDICATION RENEWAL (OUTPATIENT)
Age: 69
End: 2019-12-04

## 2019-12-10 ENCOUNTER — OTHER (OUTPATIENT)
Age: 69
End: 2019-12-10

## 2019-12-30 ENCOUNTER — APPOINTMENT (OUTPATIENT)
Dept: COLORECTAL SURGERY | Facility: CLINIC | Age: 69
End: 2019-12-30
Payer: MEDICARE

## 2019-12-30 VITALS
TEMPERATURE: 98.2 F | BODY MASS INDEX: 26.16 KG/M2 | WEIGHT: 157 LBS | DIASTOLIC BLOOD PRESSURE: 80 MMHG | HEIGHT: 65 IN | SYSTOLIC BLOOD PRESSURE: 126 MMHG

## 2019-12-30 PROCEDURE — 99213 OFFICE O/P EST LOW 20 MIN: CPT

## 2020-01-01 NOTE — ED PROVIDER NOTE - CPE EDP GASTRO NORM
Chief Complaint   Patient presents with    Other     rash- diaper area, has had thrush      1. Have you been to the ER, urgent care clinic since your last visit? Hospitalized since your last visit?no    2. Have you seen or consulted any other health care providers outside of the [address] [place] [address] since your last visit? Include any pap smears or colon screening.  no normal...

## 2020-01-06 ENCOUNTER — RX RENEWAL (OUTPATIENT)
Age: 70
End: 2020-01-06

## 2020-01-08 ENCOUNTER — RX RENEWAL (OUTPATIENT)
Age: 70
End: 2020-01-08

## 2020-02-10 ENCOUNTER — RX RENEWAL (OUTPATIENT)
Age: 70
End: 2020-02-10

## 2020-05-05 ENCOUNTER — EMERGENCY (EMERGENCY)
Facility: HOSPITAL | Age: 70
LOS: 1 days | Discharge: HOME | End: 2020-05-05
Attending: EMERGENCY MEDICINE | Admitting: EMERGENCY MEDICINE
Payer: MEDICARE

## 2020-05-05 VITALS
RESPIRATION RATE: 20 BRPM | WEIGHT: 160.06 LBS | SYSTOLIC BLOOD PRESSURE: 134 MMHG | HEART RATE: 80 BPM | HEIGHT: 66 IN | DIASTOLIC BLOOD PRESSURE: 64 MMHG | OXYGEN SATURATION: 90 % | TEMPERATURE: 99 F

## 2020-05-05 VITALS
TEMPERATURE: 98 F | HEART RATE: 88 BPM | OXYGEN SATURATION: 99 % | DIASTOLIC BLOOD PRESSURE: 76 MMHG | RESPIRATION RATE: 16 BRPM | SYSTOLIC BLOOD PRESSURE: 129 MMHG

## 2020-05-05 LAB
ALBUMIN SERPL ELPH-MCNC: 3.7 G/DL — SIGNIFICANT CHANGE UP (ref 3.3–5)
ALP SERPL-CCNC: 48 U/L — SIGNIFICANT CHANGE UP (ref 30–120)
ALT FLD-CCNC: 20 U/L DA — SIGNIFICANT CHANGE UP (ref 10–60)
ANION GAP SERPL CALC-SCNC: 14 MMOL/L — SIGNIFICANT CHANGE UP (ref 5–17)
APTT BLD: 29 SEC — SIGNIFICANT CHANGE UP (ref 28.5–37)
AST SERPL-CCNC: 15 U/L — SIGNIFICANT CHANGE UP (ref 10–40)
BASOPHILS # BLD AUTO: 0.05 K/UL — SIGNIFICANT CHANGE UP (ref 0–0.2)
BASOPHILS NFR BLD AUTO: 0.3 % — SIGNIFICANT CHANGE UP (ref 0–2)
BILIRUB SERPL-MCNC: 0.5 MG/DL — SIGNIFICANT CHANGE UP (ref 0.2–1.2)
BUN SERPL-MCNC: 44 MG/DL — HIGH (ref 7–23)
CALCIUM SERPL-MCNC: 8.9 MG/DL — SIGNIFICANT CHANGE UP (ref 8.4–10.5)
CHLORIDE SERPL-SCNC: 100 MMOL/L — SIGNIFICANT CHANGE UP (ref 96–108)
CO2 SERPL-SCNC: 22 MMOL/L — SIGNIFICANT CHANGE UP (ref 22–31)
CREAT SERPL-MCNC: 0.98 MG/DL — SIGNIFICANT CHANGE UP (ref 0.5–1.3)
EOSINOPHIL # BLD AUTO: 0.11 K/UL — SIGNIFICANT CHANGE UP (ref 0–0.5)
EOSINOPHIL NFR BLD AUTO: 0.8 % — SIGNIFICANT CHANGE UP (ref 0–6)
GLUCOSE SERPL-MCNC: 129 MG/DL — HIGH (ref 70–99)
HCT VFR BLD CALC: 38.8 % — LOW (ref 39–50)
HGB BLD-MCNC: 12.8 G/DL — LOW (ref 13–17)
IMM GRANULOCYTES NFR BLD AUTO: 0.5 % — SIGNIFICANT CHANGE UP (ref 0–1.5)
INR BLD: 1.07 RATIO — SIGNIFICANT CHANGE UP (ref 0.88–1.16)
LYMPHOCYTES # BLD AUTO: 18.7 % — SIGNIFICANT CHANGE UP (ref 13–44)
LYMPHOCYTES # BLD AUTO: 2.67 K/UL — SIGNIFICANT CHANGE UP (ref 1–3.3)
MCHC RBC-ENTMCNC: 30.2 PG — SIGNIFICANT CHANGE UP (ref 27–34)
MCHC RBC-ENTMCNC: 33 GM/DL — SIGNIFICANT CHANGE UP (ref 32–36)
MCV RBC AUTO: 91.5 FL — SIGNIFICANT CHANGE UP (ref 80–100)
MONOCYTES # BLD AUTO: 0.67 K/UL — SIGNIFICANT CHANGE UP (ref 0–0.9)
MONOCYTES NFR BLD AUTO: 4.7 % — SIGNIFICANT CHANGE UP (ref 2–14)
NEUTROPHILS # BLD AUTO: 10.73 K/UL — HIGH (ref 1.8–7.4)
NEUTROPHILS NFR BLD AUTO: 75 % — SIGNIFICANT CHANGE UP (ref 43–77)
NRBC # BLD: 0 /100 WBCS — SIGNIFICANT CHANGE UP (ref 0–0)
PLATELET # BLD AUTO: 235 K/UL — SIGNIFICANT CHANGE UP (ref 150–400)
POTASSIUM SERPL-MCNC: 3.1 MMOL/L — LOW (ref 3.5–5.3)
POTASSIUM SERPL-SCNC: 3.1 MMOL/L — LOW (ref 3.5–5.3)
PROT SERPL-MCNC: 7.5 G/DL — SIGNIFICANT CHANGE UP (ref 6–8.3)
PROTHROM AB SERPL-ACNC: 11.9 SEC — SIGNIFICANT CHANGE UP (ref 10–12.9)
RBC # BLD: 4.24 M/UL — SIGNIFICANT CHANGE UP (ref 4.2–5.8)
RBC # FLD: 13.1 % — SIGNIFICANT CHANGE UP (ref 10.3–14.5)
SODIUM SERPL-SCNC: 136 MMOL/L — SIGNIFICANT CHANGE UP (ref 135–145)
WBC # BLD: 14.3 K/UL — HIGH (ref 3.8–10.5)
WBC # FLD AUTO: 14.3 K/UL — HIGH (ref 3.8–10.5)

## 2020-05-05 PROCEDURE — 36415 COLL VENOUS BLD VENIPUNCTURE: CPT

## 2020-05-05 PROCEDURE — 99284 EMERGENCY DEPT VISIT MOD MDM: CPT | Mod: 25

## 2020-05-05 PROCEDURE — 80053 COMPREHEN METABOLIC PANEL: CPT

## 2020-05-05 PROCEDURE — 99284 EMERGENCY DEPT VISIT MOD MDM: CPT

## 2020-05-05 PROCEDURE — 85027 COMPLETE CBC AUTOMATED: CPT

## 2020-05-05 PROCEDURE — 85610 PROTHROMBIN TIME: CPT

## 2020-05-05 PROCEDURE — 96374 THER/PROPH/DIAG INJ IV PUSH: CPT

## 2020-05-05 PROCEDURE — 85730 THROMBOPLASTIN TIME PARTIAL: CPT

## 2020-05-05 RX ORDER — MORPHINE SULFATE 50 MG/1
2 CAPSULE, EXTENDED RELEASE ORAL ONCE
Refills: 0 | Status: DISCONTINUED | OUTPATIENT
Start: 2020-05-05 | End: 2020-05-05

## 2020-05-05 RX ORDER — POTASSIUM CHLORIDE 20 MEQ
40 PACKET (EA) ORAL ONCE
Refills: 0 | Status: DISCONTINUED | OUTPATIENT
Start: 2020-05-05 | End: 2020-05-09

## 2020-05-05 RX ADMIN — MORPHINE SULFATE 2 MILLIGRAM(S): 50 CAPSULE, EXTENDED RELEASE ORAL at 11:25

## 2020-05-05 NOTE — CONSULT NOTE ADULT - ASSESSMENT
Longstanding left inguinal hernia.  Increased discomfort last 24 hours.  Fortunately, no associated systemic or GI or new  complaints.  Vitals non-suggestive.  Abdominal examination benign.  Hernia not immediately reducible;  However, with verbal reassurance and Trendelenberg positioning, the hernia proved easily and completely reducible on this examination;  No sedation was required and no extended maneuvers required.  Patient tolerated reduction well and was relieved of his recent discomfort.  As such:  -Clinically improved and surgically stable  -No indication by history/ exam of strangulation and contents remained in reduction after lengthy interaction  -In light of reassuring presentation and ease of reduction, no indication for immediate surgical intervention in present Covid-19 Pandemic  -However, patient aware that surgical repair has been recommended  -Risks, benefits, nature of this reviewed in brief- he is already aware from prior contralateral repair  -He has been advised to return to ER for any deterioration  -Otherwise, to follow-up with on-call surgeon or myself to arrange for an elective repair at earliest convenience.  He is pleased, agrees, denies further questions/ concerns and is able to verbalize the concerns and plains as outlined above.

## 2020-05-05 NOTE — ED PROVIDER NOTE - PATIENT PORTAL LINK FT
You can access the FollowMyHealth Patient Portal offered by [facility] by registering at the following website: [email or web address]. By joining [employer]’s FollowMyHealth portal, you will also be able to view your health information using other applications (apps) compatible with our system.

## 2020-05-05 NOTE — ED PROVIDER NOTE - OBJECTIVE STATEMENT
pt with hx left inguinal hernia c/o left groin pan and swelling since yest. no fever, chills, ha, diarrhea, constipation, n/v.  [facility]  [facility]

## 2020-05-05 NOTE — ED PROVIDER NOTE - CARE PROVIDER_API CALL
Magan Lubin)  Surgery  [address] [place] [address]  Phone: [phone]  Fax: [phone]  Follow Up Time: 1-3 Days

## 2020-05-05 NOTE — ED PROVIDER NOTE - PROGRESS NOTE DETAILS
valery (surg) seen eval pt, reduced hernia, cleared for d/c and outpt f/u with santiago valery (surg) seen eval pt, reduced hernia, cleared for d/c and outpt f/u with klonsky if able to tolerate po Reevaluated patient at bedside.  Patient feeling much improved, tolerating po.  Discussed the results of all diagnostic testing in ED and copies of all reports given.   An opportunity to ask questions was given.  Discussed the importance of prompt, close medical follow-up.  Patient will return with any changes, concerns or persistent / worsening symptoms.  Understanding of all instructions verbalized.

## 2020-05-05 NOTE — CONSULT NOTE ADULT - SUBJECTIVE AND OBJECTIVE BOX
HPI:    · Chief Complaint: The patient is a 70y Male complaining of groin pain.	  · HPI Objective Statement: pt with hx left inguinal hernia c/o left groin pan and swelling since yest. no fever, chills, ha, diarrhea, constipation, n/v.  [facility]	  · Presenting Symptoms: PAIN, TENDERNESS	  · Negative Findings: no chills, no diarrhea, no dysuria, no fever, no nausea, no vomiting	  · Location: abdomen	  · Laterality: left	  · Area: inguinal	  · Timing: constant	  · Duration: yesterday	  · Quality: aching	  · Severity: MODERATE	  · Context: unknown	  · Recent Exposure To: none known	  · Aggravated Factors: palpation	  · Relieving Factors: none	      PAST MEDICAL & SURGICAL HISTORY:  GERD (gastroesophageal reflux disease)  Hypertension  Hypertension  Abscess  No significant past surgical history  No significant past surgical history      REVIEW OF SYSTEMS-  General: Denies fever or chills or malaise.	    Skin/Breast: Denies wounds of lesions.  	  Ophthalmologic: Denies vision changes.  	  ENMT: Denies naso or oropharyngeal discharge.    Respiratory and Thorax: Denies shortness of breath.  	  Cardiovascular:	Denies chest pain.    Gastrointestinal:	GERD; otherwise see HPI.    Genitourinary:	Reports nocturia.    Musculoskeletal:	 "Arthritis."    Neurological: Denies deficits.    Psychiatric: Reports anxiety regarding this visit/ issue, but denies depression.    Hematology/Lymphatics:	 Denies easy bruising or bleeding.    Endocrine: Denies thirst or voice changes.    Allergic/Immunologic:	Multiple drug; denies environmental.      MEDICATIONS  (STANDING):  potassium chloride    Tablet ER 40 milliEquivalent(s) Oral once  "for heartburn..."  "for blood pressure"    Allergies  Bactrim (Anaphylaxis)  penicillin (Anaphylaxis)  penicillin (Unknown)  sulfa drugs (Unknown)      SOCIAL HISTORY: Denies tobacco, EtOH abuse or illicit drug use.      FAMILY HISTORY: Non-contributory.      Vital Signs Last 24 Hrs  T(F): 98.2 (05 May 2020 12:25), Max: 98.6 (05 May 2020 10:53)  HR: 88 (05 May 2020 12:25) (80 - 88)  BP: 129/76 (05 May 2020 12:25) (129/76 - 134/64)  RR: 16 (05 May 2020 12:25) (16 - 20)  SpO2: 99% (05 May 2020 12:25) (90% - 99%)      PHYSICAL EXAM-  Constitutional: Appears elderly, but not unwell.    Eyes: PERR.    ENMT: Moist mucosal membranes.    Neck: Supple.    Breasts: Normal male without visible/ palpable lesion.    Back: No CVAT.    Respiratory: Equal expansion bilaterally.    Cardiovascular: Pulse regular in rate and rhythm.    Gastrointestinal:  Full, obese, but soft and non tense and non tender.  Right groin with well-healed incision c/w reported prior repair.  Left groin with large, but soft left inguinal hernia, mild tenderness to palpation but free of overlying or surrounding skin/ soft tissue inflammatory changes.    Genitourinary: Penis free of lesions, testes free of masses, left testicle slightly smaller and more high riding compared to right.    Rectal: Deferred.    Extremities: Symmetric with diffuse arthritic changes.    Vascular: Brisk capillary refill.    Neurological: Alert and oriented times three.    Skin: No visible lesions or wounds.    Lymph Nodes: No cervical, supraclavicular, axillary or inguinal adenopathy.    Musculoskeletal: No casts, splints or braces.    Psychiatric: Appropriate and interactive.      LABS:                        12.8   14.30 )-----------( 235      ( 05 May 2020 11:26 )             38.8     05-05    136  |  100  |  44<H>  ----------------------------<  129<H>  3.1<L>   |  22  |  0.98    Ca    8.9      05 May 2020 11:26    TPro  7.5  /  Alb  3.7  /  TBili  0.5  /  DBili  x   /  AST  15  /  ALT  20  /  AlkPhos  48  05-05    PT/INR - ( 05 May 2020 11:26 )   PT: 11.9 sec;   INR: 1.07 ratio    PTT - ( 05 May 2020 11:26 )  PTT:29.0 sec      RADIOLOGY & ADDITIONAL STUDIES:      EXAM:  CT ABDOMEN AND PELVIS IC                        PROCEDURE DATE:  11/10/2019    INTERPRETATION:  CLINICAL HISTORY: Rectal bleeding.  TECHNIQUE:  CT scan of the abdomen and pelvis with IV contrast.  Transaxial images were acquired from the domes of the diaphragm to the   symphysis pubis with intravenous contrast. Oral contrast was withheld.   Coronal and sagittal images were also provided from the transaxial source   data. 90mLs of Omnipaque 300 was administered intravenously without   complication and 10 mLs was discarded.  COMPARISON: CT of the abdomen and pelvis from 7/14/2019  FINDINGS:   The heart is not enlarged. The lung bases are clear.   The large and small bowel are normal in caliber without obstruction.   There is no free intraperitoneal air or abdominal abscess.  The appendix   is normal. Descending and sigmoid colon diverticulosis. There is no   abnormal bowel wall thickening or inflammatory change. There is no gross   extravasation of IV contrast into bowel lumen, however, limited by single   portal venous phase imaging.  The liver, gallbladder, spleen, pancreas and adrenal glands are   unremarkable.  The kidneys enhance symmetrically without hydronephrosis.   The abdominal aorta is normal in caliber. There is no retroperitoneal,   pelvic or inguinal adenopathy. There is no ascites.  There is a left inguinal hernia containing portion of the bladder and   nonobstructed large bowel coronal unchanged. Right inguinal hernia is no   longer visualized. The urinary bladder is otherwise unremarkable.   Enlarged heterogeneous prostate gland measuring up to 5.8 cm  The visualized osseous structures demonstrate no acute abnormality.   Multiple compression fracture deformities in the lower thoracic and   lumbar spine appear grossly unchanged, most severe at L1.  IMPRESSION:   Source of rectal bleeding not elucidated on this single phase study. No   significant rectal wall thickening or perirectal inflammation.  Enlarged heterogeneous prostate gland. Correlation with PSA levels is   recommended.  PARKER CLARK M.D., RADIOLOGIST  This document has been electronically signed. Nov 10 2019  7:55PM

## 2020-05-12 ENCOUNTER — APPOINTMENT (OUTPATIENT)
Dept: SURGERY | Facility: CLINIC | Age: 70
End: 2020-05-12
Payer: MEDICARE

## 2020-05-12 VITALS
BODY MASS INDEX: 26.16 KG/M2 | WEIGHT: 157 LBS | SYSTOLIC BLOOD PRESSURE: 117 MMHG | DIASTOLIC BLOOD PRESSURE: 76 MMHG | TEMPERATURE: 97.6 F | HEIGHT: 65 IN

## 2020-05-12 PROCEDURE — 99203 OFFICE O/P NEW LOW 30 MIN: CPT

## 2020-05-12 NOTE — HISTORY OF PRESENT ILLNESS
[de-identified] : 70 year old gentleman, accompanied by daughter.  Present for evaluation of Chronic Left inguinal hernia.\par [facility] last week and reduced.  Sent for surgical eval to discuss elective repair.

## 2020-05-12 NOTE — ASSESSMENT
[FreeTextEntry1] : 70 year old with chronic Left inguinal/scrotal hernia.  Was believed to be incarcerated last week as he presented to [place] ED but fortunately was able to be reduced.   Previous CT images personally reviewed which ahd contained a small segment of  colon as well as a portion of urinary bladder.  \par \par Today he is asymptomatic and he presents today to discuss elective repair.\par He denies obstructive symptoms.  No nausea, vomiting or change in bowel habits.\par I've instructed the patient on how to reduce manually as it protrudes with standing or Valsalva.\par \par Surgical repair is certainly recommended.  Risk, Benefits, and Alternatives to surgery have been discussed.  This includes but is not limited to bleeding, infection, damage to adjacent structures, need for additional surgery or interventions, adverse effects of anesthesia such as cardio-respiratory complications, prolonged intubation, cardiac arrhythmia, arrest, and or death.  Risks of forgoing surgery have also been discussed including progression of, and/or worsening of current condition which may then require urgent or emergent treatment or surgery.\par Open repair of left inguinal hernia to be scheduled once COVID restrictions lifted.  Pre-op medical and cardiology evaluations would be appreciated.  Referred back to his PMD for clearance.\par \par Signs and symptoms of incarceration/strangulation/obstruction have been reviewed with the patient.  Should such symptoms present, urgent medical attention should be sought.  Contact my office immediately and or head to your nearest emergency room for evaluation and treatment.  This may require immediate surgical repair.\par \par

## 2020-05-12 NOTE — PHYSICAL EXAM
[Normal Breath Sounds] : Normal breath sounds [Normal Heart Sounds] : normal heart sounds [Normal Rate and Rhythm] : normal rate and rhythm [No Rash or Lesion] : No rash or lesion [Alert] : alert [Oriented to Place] : oriented to place [Oriented to Person] : oriented to person [Oriented to Time] : oriented to time [Calm] : calm [de-identified] : Healthy appearing gentleman in no distress.   [de-identified] : EVY MIRANDA EOMI [de-identified] : Soft, nontender nondistended, positive bowel sounds in all four quads.  No rebound or guarding.  Large but small necked left inguinal hernia extending into left scrotum.  Soft, non tender and reducible with some manipulation.  Well healed scar in right groin consistent with previous right inguinal hernia,  no evidence of recurrence.\par  [de-identified] : Testes descended bilaterally. [de-identified] : Ambulating without difficulty or assistance

## 2020-05-26 NOTE — ED PROCEDURE NOTE - NS_ ATTENDINGSCRIBEDETAILS _ED_A_ED_FT
Ok for flexeril 5mg 1 tab po TIDprn spasms #30 no refills and refer to [facility] for low back pain. Anam Lim MD - The scribe's documentation has been prepared under my direction and personally reviewed by me in its entirety. I confirm that the note above accurately reflects all work, treatment, procedures, and medical decision making performed by me.

## 2020-06-01 ENCOUNTER — OUTPATIENT (OUTPATIENT)
Dept: OUTPATIENT SERVICES | Facility: HOSPITAL | Age: 70
LOS: 1 days | End: 2020-06-01
Payer: MEDICARE

## 2020-06-01 ENCOUNTER — LABORATORY RESULT (OUTPATIENT)
Age: 70
End: 2020-06-01

## 2020-06-01 ENCOUNTER — APPOINTMENT (OUTPATIENT)
Dept: INTERNAL MEDICINE | Facility: CLINIC | Age: 70
End: 2020-06-01
Payer: MEDICARE

## 2020-06-01 ENCOUNTER — NON-APPOINTMENT (OUTPATIENT)
Age: 70
End: 2020-06-01

## 2020-06-01 VITALS
DIASTOLIC BLOOD PRESSURE: 76 MMHG | RESPIRATION RATE: 14 BRPM | HEART RATE: 74 BPM | SYSTOLIC BLOOD PRESSURE: 122 MMHG | OXYGEN SATURATION: 95 % | BODY MASS INDEX: 25.16 KG/M2 | TEMPERATURE: 97.3 F | WEIGHT: 151 LBS | HEIGHT: 65 IN

## 2020-06-01 VITALS — WEIGHT: 175.05 LBS | HEIGHT: 69 IN

## 2020-06-01 DIAGNOSIS — Z01.818 ENCOUNTER FOR OTHER PREPROCEDURAL EXAMINATION: ICD-10-CM

## 2020-06-01 DIAGNOSIS — Z87.898 PERSONAL HISTORY OF OTHER SPECIFIED CONDITIONS: ICD-10-CM

## 2020-06-01 DIAGNOSIS — K40.90 UNILATERAL INGUINAL HERNIA, WITHOUT OBSTRUCTION OR GANGRENE, NOT SPECIFIED AS RECURRENT: ICD-10-CM

## 2020-06-01 DIAGNOSIS — Z23 ENCOUNTER FOR IMMUNIZATION: ICD-10-CM

## 2020-06-01 DIAGNOSIS — Z86.39 PERSONAL HISTORY OF OTHER ENDOCRINE, NUTRITIONAL AND METABOLIC DISEASE: ICD-10-CM

## 2020-06-01 PROCEDURE — G0463: CPT

## 2020-06-01 PROCEDURE — 99214 OFFICE O/P EST MOD 30 MIN: CPT | Mod: 25

## 2020-06-01 PROCEDURE — 93000 ELECTROCARDIOGRAM COMPLETE: CPT

## 2020-06-01 RX ORDER — SODIUM PICOSULFATE, MAGNESIUM OXIDE, AND ANHYDROUS CITRIC ACID 10; 3.5; 12 MG/160ML; G/160ML; G/160ML
10-3.5-12 MG-GM LIQUID ORAL
Qty: 1 | Refills: 0 | Status: DISCONTINUED | COMMUNITY
Start: 2019-11-19 | End: 2020-06-01

## 2020-06-01 RX ORDER — FLUTICASONE PROPIONATE 50 UG/1
50 SPRAY, METERED NASAL DAILY
Qty: 48 | Refills: 0 | Status: DISCONTINUED | COMMUNITY
Start: 2020-02-10 | End: 2020-06-01

## 2020-06-01 RX ORDER — HYDROCORTISONE 25 MG/G
2.5 CREAM TOPICAL 3 TIMES DAILY
Qty: 1 | Refills: 6 | Status: DISCONTINUED | COMMUNITY
Start: 2019-11-13 | End: 2020-06-01

## 2020-06-01 RX ORDER — BENZONATATE 100 MG/1
100 CAPSULE ORAL 3 TIMES DAILY
Qty: 21 | Refills: 0 | Status: DISCONTINUED | COMMUNITY
Start: 2019-06-03 | End: 2020-06-01

## 2020-06-01 RX ORDER — HYDROCORTISONE ACETATE 25 MG/1
25 SUPPOSITORY RECTAL
Qty: 30 | Refills: 0 | Status: DISCONTINUED | COMMUNITY
Start: 2019-11-06 | End: 2020-06-01

## 2020-06-01 RX ORDER — HYDROCORTISONE 25 MG/G
2.5 CREAM TOPICAL
Qty: 1 | Refills: 6 | Status: DISCONTINUED | COMMUNITY
Start: 2018-10-10 | End: 2020-06-01

## 2020-06-01 NOTE — HISTORY OF PRESENT ILLNESS
[No Pertinent Cardiac History] : no history of aortic stenosis, atrial fibrillation, coronary artery disease, recent myocardial infarction, or implantable device/pacemaker [No Pertinent Pulmonary History] : no history of asthma, COPD, sleep apnea, or smoking [No Adverse Anesthesia Reaction] : no adverse anesthesia reaction in self or family member [(Patient denies any chest pain, claudication, dyspnea on exertion, orthopnea, palpitations or syncope)] : Patient denies any chest pain, claudication, dyspnea on exertion, orthopnea, palpitations or syncope [Chronic Anticoagulation] : no chronic anticoagulation [Chronic Kidney Disease] : no chronic kidney disease [Diabetes] : no diabetes [FreeTextEntry1] : inguinal hernia repair [FreeTextEntry3] : Dr Lubin [FreeTextEntry4] : Pt is scheduled for hernia repair and here for preop evaluation. Pt c/o discomfort in his groin. \par He has h/o DVT and PE in 2/2017.\par He had rectal bleeding in 2019 and had a colonscopy.

## 2020-06-01 NOTE — H&P PST ADULT - NSICDXPROBLEM_GEN_ALL_CORE_FT
PROBLEM DIAGNOSES  Problem: Inguinal hernia, left  Assessment and Plan: Open repair of left Inguinal hernia    69 y/o male interviewed over the phone due to COVID pandemic..Physical will be completed on the day of procedure. COVID testing will be done in [facility].Pre-procedure instructions given and pt verbalized understanding. PROBLEM DIAGNOSES  Problem: Inguinal hernia, left  Assessment and Plan:  Scheduled for open repair of left inguinal hernia on 6/9/20 with Dr Lubin. 69 y/o male interviewed over the phone due to COVID pandemic. History obtained from daughter. Physical will be completed on the day of procedure. COVID testing will be done in [facility]. Pre-procedure instructions given and pt verbalized understanding. Medical eval and labs advised.

## 2020-06-01 NOTE — H&P PST ADULT - NSICDXPASTMEDICALHX_GEN_ALL_CORE_FT
PAST MEDICAL HISTORY:  Abscess     GERD (gastroesophageal reflux disease)     Hypertension     Hypertension

## 2020-06-01 NOTE — H&P PST ADULT - NSICDXPROBLEM_GEN_ALL_CORE_FT
PROBLEM DIAGNOSES  Problem: Inguinal hernia, left  Assessment and Plan: Open repair of left Inguinal hernia    71 y/o male interviewed over the phone due to COVID pandemic..Physical will be completed on the day of procedure. COVID testing will be done in [facility].Pre-procedure instructions given and pt verbalized understanding.

## 2020-06-01 NOTE — PHYSICAL EXAM
[Normal] : no carotid or abdominal bruits heard, no varicosities, pedal pulses are present, no peripheral edema, no extremity clubbing or cyanosis and no palpable aorta [Normal Affect] : the affect was normal [de-identified] : kyphosis of his back

## 2020-06-01 NOTE — ASSESSMENT
[Patient Optimized for Surgery] : Patient optimized for surgery [FreeTextEntry4] : kyphosis\par he is advised to check bone density\par \par HTN\par continue meds\par good control\par \par elevated PSA\par levels have been very high\par he has seen urology in the past and is advised to go back\par \par inguinal hernia\par for surgery\par \par medically cleared for surgery

## 2020-06-01 NOTE — H&P PST ADULT - ASSESSMENT
69 y/o male with c/o of left inguinal hernia for a while. Seen by Dr Lubin. Scheduled for open repair of left inguinal hernia on 6/9/20 with Dr Lubin. 69 y/o male interviewed over the phone due to COVID pandemic. History obtained from daughter. Physical will be completed on the day of procedure. COVID testing will be done in [facility]. Pre-procedure instructions given and pt verbalized understanding. Medical eval and labs advised.   Pt lives alone , daughter requesting home care. Advised to contact surgeon's office. Also to speak to  on the day of procedure.

## 2020-06-02 DIAGNOSIS — R63.0 ANOREXIA: ICD-10-CM

## 2020-06-02 LAB
25(OH)D3 SERPL-MCNC: 69.5 NG/ML
ALBUMIN SERPL ELPH-MCNC: 4.3 G/DL
ALP BLD-CCNC: 41 U/L
ALT SERPL-CCNC: 8 U/L
ANION GAP SERPL CALC-SCNC: 18 MMOL/L
AST SERPL-CCNC: 11 U/L
BASOPHILS # BLD AUTO: 0.04 K/UL
BASOPHILS NFR BLD AUTO: 0.3 %
BILIRUB SERPL-MCNC: 0.3 MG/DL
BUN SERPL-MCNC: 70 MG/DL
CALCIUM SERPL-MCNC: 8.8 MG/DL
CHLORIDE SERPL-SCNC: 100 MMOL/L
CO2 SERPL-SCNC: 20 MMOL/L
CREAT SERPL-MCNC: 1.77 MG/DL
EOSINOPHIL # BLD AUTO: 0.05 K/UL
EOSINOPHIL NFR BLD AUTO: 0.3 %
ESTIMATED AVERAGE GLUCOSE: 137 MG/DL
FOLATE SERPL-MCNC: 9.9 NG/ML
GLUCOSE SERPL-MCNC: 101 MG/DL
HBA1C MFR BLD HPLC: 6.4 %
HCT VFR BLD CALC: 38.4 %
HGB BLD-MCNC: 11.8 G/DL
IMM GRANULOCYTES NFR BLD AUTO: 1 %
LYMPHOCYTES # BLD AUTO: 1.03 K/UL
LYMPHOCYTES NFR BLD AUTO: 6.9 %
MAN DIFF?: NORMAL
MCHC RBC-ENTMCNC: 30.3 PG
MCHC RBC-ENTMCNC: 30.7 GM/DL
MCV RBC AUTO: 98.5 FL
MONOCYTES # BLD AUTO: 0.11 K/UL
MONOCYTES NFR BLD AUTO: 0.7 %
NEUTROPHILS # BLD AUTO: 13.52 K/UL
NEUTROPHILS NFR BLD AUTO: 90.8 %
PLATELET # BLD AUTO: 311 K/UL
POTASSIUM SERPL-SCNC: 4.6 MMOL/L
PROT SERPL-MCNC: 6.4 G/DL
PSA SERPL-MCNC: 212 NG/ML
RBC # BLD: 3.9 M/UL
RBC # FLD: 13.9 %
SARS-COV-2 IGG SERPL IA-ACNC: 0.1 INDEX
SARS-COV-2 IGG SERPL QL IA: NEGATIVE
SODIUM SERPL-SCNC: 137 MMOL/L
TSH SERPL-ACNC: 6.44 UIU/ML
VIT B12 SERPL-MCNC: 607 PG/ML
WBC # FLD AUTO: 14.9 K/UL

## 2020-06-04 ENCOUNTER — APPOINTMENT (OUTPATIENT)
Dept: INTERNAL MEDICINE | Facility: CLINIC | Age: 70
End: 2020-06-04
Payer: MEDICARE

## 2020-06-04 VITALS
WEIGHT: 151 LBS | DIASTOLIC BLOOD PRESSURE: 70 MMHG | HEART RATE: 119 BPM | TEMPERATURE: 98.4 F | HEIGHT: 65 IN | SYSTOLIC BLOOD PRESSURE: 120 MMHG | RESPIRATION RATE: 14 BRPM | OXYGEN SATURATION: 98 % | BODY MASS INDEX: 25.16 KG/M2

## 2020-06-04 DIAGNOSIS — R79.89 OTHER SPECIFIED ABNORMAL FINDINGS OF BLOOD CHEMISTRY: ICD-10-CM

## 2020-06-04 PROCEDURE — 36415 COLL VENOUS BLD VENIPUNCTURE: CPT

## 2020-06-04 PROCEDURE — 99214 OFFICE O/P EST MOD 30 MIN: CPT | Mod: 25

## 2020-06-05 LAB
ALBUMIN MFR SERPL ELPH: 62.2 %
ALBUMIN SERPL ELPH-MCNC: 3.9 G/DL
ALBUMIN SERPL-MCNC: 3.7 G/DL
ALBUMIN/GLOB SERPL: 1.6 RATIO
ALP BLD-CCNC: 40 U/L
ALPHA1 GLOB MFR SERPL ELPH: 5.6 %
ALPHA1 GLOB SERPL ELPH-MCNC: 0.3 G/DL
ALPHA2 GLOB MFR SERPL ELPH: 13 %
ALPHA2 GLOB SERPL ELPH-MCNC: 0.8 G/DL
ALT SERPL-CCNC: 10 U/L
ANION GAP SERPL CALC-SCNC: 16 MMOL/L
APPEARANCE: ABNORMAL
AST SERPL-CCNC: 12 U/L
B-GLOBULIN MFR SERPL ELPH: 11.8 %
B-GLOBULIN SERPL ELPH-MCNC: 0.7 G/DL
B2 MICROGLOB SERPL-MCNC: 3.7 MG/L
BACTERIA: ABNORMAL
BASOPHILS # BLD AUTO: 0.05 K/UL
BASOPHILS NFR BLD AUTO: 0.3 %
BILIRUB SERPL-MCNC: 0.4 MG/DL
BILIRUBIN URINE: NEGATIVE
BLOOD URINE: NEGATIVE
BUN SERPL-MCNC: 71 MG/DL
CALCIUM SERPL-MCNC: 9.1 MG/DL
CHLORIDE SERPL-SCNC: 101 MMOL/L
CO2 SERPL-SCNC: 19 MMOL/L
COLOR: NORMAL
CREAT SERPL-MCNC: 1.55 MG/DL
CRP SERPL-MCNC: 1.73 MG/DL
DEPRECATED KAPPA LC FREE/LAMBDA SER: 2.51 RATIO
DEPRECATED KAPPA LC FREE/LAMBDA SER: 2.51 RATIO
EOSINOPHIL # BLD AUTO: 0.26 K/UL
EOSINOPHIL NFR BLD AUTO: 1.8 %
ERYTHROCYTE [SEDIMENTATION RATE] IN BLOOD BY WESTERGREN METHOD: 25 MM/HR
FERRITIN SERPL-MCNC: 221 NG/ML
FOLATE SERPL-MCNC: 7.3 NG/ML
GAMMA GLOB FLD ELPH-MCNC: 0.4 G/DL
GAMMA GLOB MFR SERPL ELPH: 7.4 %
GLUCOSE QUALITATIVE U: NEGATIVE
GLUCOSE SERPL-MCNC: 101 MG/DL
HCT VFR BLD CALC: 36.1 %
HGB BLD-MCNC: 11.3 G/DL
HYALINE CASTS: 1 /LPF
IGA SER QL IEP: 140 MG/DL
IGG SER QL IEP: 475 MG/DL
IGM SER QL IEP: 42 MG/DL
IMM GRANULOCYTES NFR BLD AUTO: 1 %
INTERPRETATION SERPL IEP-IMP: NORMAL
IRON SATN MFR SERPL: 19 %
IRON SERPL-MCNC: 58 UG/DL
KAPPA LC CSF-MCNC: 0.8 MG/DL
KAPPA LC CSF-MCNC: 0.8 MG/DL
KAPPA LC SERPL-MCNC: 2.01 MG/DL
KAPPA LC SERPL-MCNC: 2.01 MG/DL
KETONES URINE: NEGATIVE
LEUKOCYTE ESTERASE URINE: ABNORMAL
LYMPHOCYTES # BLD AUTO: 2.65 K/UL
LYMPHOCYTES NFR BLD AUTO: 18.4 %
M PROTEIN SPEC IFE-MCNC: NORMAL
MAN DIFF?: NORMAL
MCHC RBC-ENTMCNC: 30.5 PG
MCHC RBC-ENTMCNC: 31.3 GM/DL
MCV RBC AUTO: 97.3 FL
MICROSCOPIC-UA: NORMAL
MONOCYTES # BLD AUTO: 1.09 K/UL
MONOCYTES NFR BLD AUTO: 7.6 %
NEUTROPHILS # BLD AUTO: 10.2 K/UL
NEUTROPHILS NFR BLD AUTO: 70.9 %
NITRITE URINE: NEGATIVE
PH URINE: 5
PLATELET # BLD AUTO: 282 K/UL
POTASSIUM SERPL-SCNC: 3.7 MMOL/L
PROT SERPL-MCNC: 6 G/DL
PROTEIN URINE: NEGATIVE
RBC # BLD: 3.71 M/UL
RBC # BLD: 3.71 M/UL
RBC # FLD: 14.1 %
RED BLOOD CELLS URINE: 0 /HPF
RETICS # AUTO: 1.5 %
RETICS AGGREG/RBC NFR: 53.8 K/UL
SODIUM SERPL-SCNC: 136 MMOL/L
SPECIFIC GRAVITY URINE: 1.01
SQUAMOUS EPITHELIAL CELLS: 0 /HPF
TIBC SERPL-MCNC: 314 UG/DL
UIBC SERPL-MCNC: 256 UG/DL
UROBILINOGEN URINE: NORMAL
VIT B12 SERPL-MCNC: 509 PG/ML
WBC # FLD AUTO: 14.39 K/UL
WHITE BLOOD CELLS URINE: 31 /HPF

## 2020-06-05 NOTE — HISTORY OF PRESENT ILLNESS
[FreeTextEntry1] : f/u abnormal labs [de-identified] : Pt is here to repeat blood work. He has h/o vertebral compression fractures from lifting heavy weights years ago. No back pain. He does have hip pain which is the only area that bothers him.\par He has a large inguinal hernia and needs repair. \par He is now mildly anemic with elevated BUN and creat. He has increased his fluids over the last few days. \par He was advised in the fall to see a hematologist and urologist, but does not feel he needs to.

## 2020-06-05 NOTE — PHYSICAL EXAM
[No Edema] : there was no peripheral edema [Normal] : soft, non-tender, non-distended, no masses palpated, no HSM and normal bowel sounds [Normal Mood] : the mood was normal [de-identified] : left inguinal hernia [de-identified] : kyphosis

## 2020-06-06 ENCOUNTER — APPOINTMENT (OUTPATIENT)
Dept: ULTRASOUND IMAGING | Facility: CLINIC | Age: 70
End: 2020-06-06
Payer: MEDICARE

## 2020-06-06 ENCOUNTER — APPOINTMENT (OUTPATIENT)
Dept: RADIOLOGY | Facility: CLINIC | Age: 70
End: 2020-06-06
Payer: MEDICARE

## 2020-06-06 ENCOUNTER — OUTPATIENT (OUTPATIENT)
Dept: OUTPATIENT SERVICES | Facility: HOSPITAL | Age: 70
LOS: 1 days | End: 2020-06-06
Payer: MEDICARE

## 2020-06-06 DIAGNOSIS — R79.89 OTHER SPECIFIED ABNORMAL FINDINGS OF BLOOD CHEMISTRY: ICD-10-CM

## 2020-06-06 PROCEDURE — 76770 US EXAM ABDO BACK WALL COMP: CPT

## 2020-06-06 PROCEDURE — 71046 X-RAY EXAM CHEST 2 VIEWS: CPT | Mod: 26

## 2020-06-06 PROCEDURE — 76770 US EXAM ABDO BACK WALL COMP: CPT | Mod: 26

## 2020-06-06 PROCEDURE — 71046 X-RAY EXAM CHEST 2 VIEWS: CPT

## 2020-06-06 PROCEDURE — 77080 DXA BONE DENSITY AXIAL: CPT

## 2020-06-06 PROCEDURE — 77080 DXA BONE DENSITY AXIAL: CPT | Mod: 26

## 2020-06-10 ENCOUNTER — APPOINTMENT (OUTPATIENT)
Dept: INTERNAL MEDICINE | Facility: CLINIC | Age: 70
End: 2020-06-10
Payer: MEDICARE

## 2020-06-10 VITALS
WEIGHT: 151 LBS | RESPIRATION RATE: 14 BRPM | DIASTOLIC BLOOD PRESSURE: 70 MMHG | HEIGHT: 65 IN | SYSTOLIC BLOOD PRESSURE: 130 MMHG | TEMPERATURE: 98.3 F | OXYGEN SATURATION: 98 % | HEART RATE: 84 BPM | BODY MASS INDEX: 25.16 KG/M2

## 2020-06-10 PROCEDURE — 99213 OFFICE O/P EST LOW 20 MIN: CPT

## 2020-06-10 NOTE — PHYSICAL EXAM
[Normal] : soft, non-tender, non-distended, no masses palpated, no HSM and normal bowel sounds [de-identified] : kyphosis

## 2020-06-10 NOTE — HISTORY OF PRESENT ILLNESS
[FreeTextEntry1] : f/u labs [de-identified] : Pt is here to get labs done again. He has left inguinal hernia. He states he is eating and drinking better. \par He went to the orthopedist for his knees and he was advised to take glucosamine chondroitin which he is starting.\par He is waiting to get hernia surgery. I discussed his labs with his daughter and advised urology and hematology appts.

## 2020-06-12 LAB
ANION GAP SERPL CALC-SCNC: 17 MMOL/L
BASOPHILS # BLD AUTO: 0.07 K/UL
BASOPHILS NFR BLD AUTO: 0.6 %
BUN SERPL-MCNC: 85 MG/DL
CALCIUM SERPL-MCNC: 8.9 MG/DL
CHLORIDE SERPL-SCNC: 102 MMOL/L
CO2 SERPL-SCNC: 18 MMOL/L
CREAT SERPL-MCNC: 1.82 MG/DL
EOSINOPHIL # BLD AUTO: 0.3 K/UL
EOSINOPHIL NFR BLD AUTO: 2.7 %
GLUCOSE SERPL-MCNC: 105 MG/DL
HCT VFR BLD CALC: 36.4 %
HGB BLD-MCNC: 11.1 G/DL
IMM GRANULOCYTES NFR BLD AUTO: 0.8 %
LDH SERPL-CCNC: 146 U/L
LYMPHOCYTES # BLD AUTO: 3.52 K/UL
LYMPHOCYTES NFR BLD AUTO: 31.7 %
MAN DIFF?: NORMAL
MCHC RBC-ENTMCNC: 30.2 PG
MCHC RBC-ENTMCNC: 30.5 GM/DL
MCV RBC AUTO: 98.9 FL
MONOCYTES # BLD AUTO: 0.65 K/UL
MONOCYTES NFR BLD AUTO: 5.9 %
NEUTROPHILS # BLD AUTO: 6.47 K/UL
NEUTROPHILS NFR BLD AUTO: 58.3 %
PLATELET # BLD AUTO: 262 K/UL
POTASSIUM SERPL-SCNC: 3.8 MMOL/L
RBC # BLD: 3.68 M/UL
RBC # FLD: 14.6 %
SODIUM SERPL-SCNC: 138 MMOL/L
WBC # FLD AUTO: 11.1 K/UL

## 2020-07-06 ENCOUNTER — APPOINTMENT (OUTPATIENT)
Dept: UROLOGY | Facility: CLINIC | Age: 70
End: 2020-07-06
Payer: MEDICARE

## 2020-07-06 VITALS
DIASTOLIC BLOOD PRESSURE: 87 MMHG | HEIGHT: 65 IN | TEMPERATURE: 99 F | HEART RATE: 101 BPM | OXYGEN SATURATION: 100 % | BODY MASS INDEX: 25.99 KG/M2 | WEIGHT: 156 LBS | SYSTOLIC BLOOD PRESSURE: 147 MMHG

## 2020-07-06 PROCEDURE — 99204 OFFICE O/P NEW MOD 45 MIN: CPT

## 2020-07-06 NOTE — PHYSICAL EXAM
[General Appearance - Well Developed] : well developed [General Appearance - In No Acute Distress] : no acute distress [Normal Appearance] : normal appearance [] : no respiratory distress [Abdomen Soft] : soft [Abdomen Tenderness] : non-tender [Costovertebral Angle Tenderness] : no ~M costovertebral angle tenderness [Abdomen Mass (___ Cm)] : no abdominal mass palpated [Urethral Meatus] : meatus normal [Epididymis] : the epididymides were normal [Scrotum] : the scrotum was normal [Penis Abnormality] : normal uncircumcised penis [Testes Tenderness] : no tenderness of the testes [Testes Mass (___cm)] : there were no testicular masses [FreeTextEntry1] : Prostate partially palpated, non tender, asymmetrical:L>R, hard to palpate  [Normal Station and Gait] : the gait and station were normal for the patient's age [Skin Color & Pigmentation] : normal skin color and pigmentation [No Focal Deficits] : no focal deficits [Oriented To Time, Place, And Person] : oriented to person, place, and time [No Palpable Adenopathy] : no palpable adenopathy

## 2020-07-06 NOTE — ASSESSMENT
[FreeTextEntry1] : Benign Prostatic Hyperplasia:\par No bothersome lower urinary tract symptoms. \par \par Elevated PSA:\par Discussed with the patient that PSA is a substance produced by the prostate gland. Elevated PSA levels may indicate a noncancerous condition such as prostatitis, or an enlarged prostate but it may also indicate prostate cancer.\par Discussed PSA screening and latest recommendations/guidelines- USPTF and AUA. \par \par Recommended Transperineal Prostate Biopsy. \par Explained to the patient that the procedure would last approximately 15 minutes beginning first with a digital rectal exam followed by insertion of the ultrasound probe, injection of local anesthetic, and finally multiple samples would be obtained with a needle biopsy- transperineally.\par Reviewed the risks, benefits, and possible complications including inability to urinate, infection, and bleeding. The patient understood that blood in the urine, stool, and semen is common for several days to weeks, and that he should go immediately to the Emergency Room if he develops fever, chills, nausea or vomiting. \par All questions answered. The patient verbalized understanding, and has agreed to proceed. \par \par Order placed for Fleet's enema. Pt instructed to give himself enema the morning of the procedure. \par Pt instructed to stop taking ASA/Plavix/Coumadin/NSAIDs 1 week before appointment. \par \par Return to clinic for next available Prostate biopsy.

## 2020-07-06 NOTE — REVIEW OF SYSTEMS
[Feeling Tired] : feeling tired [both] : pain during and after intercourse [denies] : denies pain with orgasm [base] : pain in base of penis [Joint Swelling] : joint swelling [Joint Pain] : joint pain [Negative] : Heme/Lymph

## 2020-07-06 NOTE — HISTORY OF PRESENT ILLNESS
[FreeTextEntry1] : 71 yo male presents for Elevated PSA. \par Has history of Elevated PSA. Recent PSA high. \par Denies any recent unintentional weight loss, night sweats and new bone or back pain.\par Family history of Prostate cancer- no. \par Reports reasonable stream, urinates every few hours or so during the day. Nocturia of 1-2 x. \par Denies hesitancy, straining, intermittency, urgency, incontinence, sense of incomplete emptying.\par Denies dysuria, hematuria, lower abdominal or flank pain, fever, chills or rigors.\par \par As per Daughter: Pt has history of Elevated PSA and saw Dr Gann in 2017 when Prostate biopsy was recommended but could not be done as pt was anticoagulation. \par \par

## 2020-07-21 ENCOUNTER — LABORATORY RESULT (OUTPATIENT)
Age: 70
End: 2020-07-21

## 2020-07-22 ENCOUNTER — APPOINTMENT (OUTPATIENT)
Dept: UROLOGY | Facility: CLINIC | Age: 70
End: 2020-07-22
Payer: MEDICARE

## 2020-07-22 VITALS
WEIGHT: 160 LBS | SYSTOLIC BLOOD PRESSURE: 116 MMHG | DIASTOLIC BLOOD PRESSURE: 76 MMHG | TEMPERATURE: 98.2 F | OXYGEN SATURATION: 98 % | HEART RATE: 103 BPM | BODY MASS INDEX: 25.71 KG/M2 | HEIGHT: 66 IN

## 2020-07-22 PROCEDURE — 76872 US TRANSRECTAL: CPT

## 2020-07-22 PROCEDURE — 55700: CPT

## 2020-07-22 PROCEDURE — 76942 ECHO GUIDE FOR BIOPSY: CPT | Mod: 59

## 2020-07-27 ENCOUNTER — APPOINTMENT (OUTPATIENT)
Dept: UROLOGY | Facility: CLINIC | Age: 70
End: 2020-07-27

## 2020-07-29 ENCOUNTER — APPOINTMENT (OUTPATIENT)
Dept: CT IMAGING | Facility: IMAGING CENTER | Age: 70
End: 2020-07-29

## 2020-07-29 ENCOUNTER — APPOINTMENT (OUTPATIENT)
Dept: UROLOGY | Facility: CLINIC | Age: 70
End: 2020-07-29
Payer: MEDICARE

## 2020-07-29 ENCOUNTER — APPOINTMENT (OUTPATIENT)
Dept: NUCLEAR MEDICINE | Facility: IMAGING CENTER | Age: 70
End: 2020-07-29

## 2020-07-29 ENCOUNTER — APPOINTMENT (OUTPATIENT)
Dept: CT IMAGING | Facility: CLINIC | Age: 70
End: 2020-07-29

## 2020-07-29 PROCEDURE — 99213 OFFICE O/P EST LOW 20 MIN: CPT

## 2020-07-29 NOTE — ASSESSMENT
[FreeTextEntry1] : patient with pca \par mackenzie grade 8 ( 4+4) and 7 ( 4+ 3) \par psa 212\par recent bone density : Osteoporosis\par discussed use of hormonal therapy to control PCA ( not treat for cure) pending CT and BONE SCAN results\par would recommend Heme- onc f/u for additional meds due to use of hormones in setting of osteoporosis which can worsen.\par Radiation as an alertanative form of treatment if no evidence of metastatic disease - discussed ( with hormones)\par for now angelika nguyen rtc  after imaging to discuss and likely begin hormonal therapy with Casodex for 2-3 weeks after which Lupron shot can be give ( after 2 weeks) for 4 weeks as a trial and then extended to q 3-4 m or q 6months. \par patient and daughter understand above diagnosis and evaluation and all questions answered.\par

## 2020-07-29 NOTE — PHYSICAL EXAM
[General Appearance - Well Developed] : well developed [General Appearance - Well Nourished] : well nourished [Normal Appearance] : normal appearance [General Appearance - In No Acute Distress] : no acute distress [Well Groomed] : well groomed [Abdomen Tenderness] : non-tender [Abdomen Soft] : soft [Abdomen Hernia] : no hernia was discovered [] : no hepato-splenomegaly [Abdomen Mass (___ Cm)] : no abdominal mass palpated [Normal Station and Gait] : the gait and station were normal for the patient's age [Costovertebral Angle Tenderness] : no ~M costovertebral angle tenderness [Cervical Lymph Nodes Enlarged Posterior Bilaterally] : posterior cervical [Cervical Lymph Nodes Enlarged Anterior Bilaterally] : anterior cervical [Supraclavicular Lymph Nodes Enlarged Bilaterally] : supraclavicular

## 2020-07-29 NOTE — HISTORY OF PRESENT ILLNESS
[FreeTextEntry1] : patient here with daughter\par recent dx of Prostate cancer determined by bx , Left lobe of prostate, Tony group 4 ( high grade) and psa of 212 \par patient with long hx of elev psa ( jan 2013 was 22) but daughter states that he continued to refuse evaluatin with biopsy \par after dx of pca found, he discussed with  about need for further eval of 'spread' of disease with Cgt and bone scan\par discussed that in Nov 2019 had ct scan that showed NO adenopathy and bones visualized on that scan showed stable compression fracture\par daughter questioned need for reimaging and this was reviewed , needed due to aggressive nature of prostate cancer\par she states appt set for Aug 11 to have both tests\par for now, patient does c/o lower back pain and left hip, denies any LUTS, bowel issues or weight changes \par he continues to ask if medicine can be given to treat his PCa that will allow him to continue to work as a [employer] which he states he has been doing since he was very young.

## 2020-08-10 ENCOUNTER — APPOINTMENT (OUTPATIENT)
Dept: UROLOGY | Facility: CLINIC | Age: 70
End: 2020-08-10

## 2020-08-12 ENCOUNTER — APPOINTMENT (OUTPATIENT)
Dept: UROLOGY | Facility: CLINIC | Age: 70
End: 2020-08-12

## 2020-09-09 ENCOUNTER — APPOINTMENT (OUTPATIENT)
Dept: INTERNAL MEDICINE | Facility: CLINIC | Age: 70
End: 2020-09-09

## 2020-09-15 ENCOUNTER — APPOINTMENT (OUTPATIENT)
Dept: SURGERY | Facility: CLINIC | Age: 70
End: 2020-09-15
Payer: MEDICARE

## 2020-09-15 VITALS
HEIGHT: 62 IN | DIASTOLIC BLOOD PRESSURE: 90 MMHG | OXYGEN SATURATION: 99 % | HEART RATE: 69 BPM | SYSTOLIC BLOOD PRESSURE: 143 MMHG

## 2020-09-15 PROCEDURE — 99215 OFFICE O/P EST HI 40 MIN: CPT

## 2020-09-15 NOTE — HISTORY OF PRESENT ILLNESS
[de-identified] : 70 year old gentleman, accompanied by daughter.  Present for evaluation of Chronic Left inguinal hernia.\par Seen in [facility] ED last week and reduced.  Sent for surgical eval to discuss elective repair. [de-identified] : Dx with Prostate CA.  Now on hormonal therapy with plans for potential XRT.  Uncertain if they wish to pursue XRT.

## 2020-09-15 NOTE — PHYSICAL EXAM
[Normal Breath Sounds] : Normal breath sounds [Normal Rate and Rhythm] : normal rate and rhythm [Normal Heart Sounds] : normal heart sounds [Alert] : alert [No Rash or Lesion] : No rash or lesion [Oriented to Place] : oriented to place [Oriented to Person] : oriented to person [Oriented to Time] : oriented to time [de-identified] : EVY MIRANDA EOMI [Calm] : calm [de-identified] : Healthy appearing gentleman in no distress.   [de-identified] : Soft, nontender nondistended, positive bowel sounds in all four quads.  No rebound or guarding.  Large but small necked left inguinal hernia extending into left scrotum.  Soft, non tender and reducible with some manipulation.  Well healed scar in right groin consistent with previous right inguinal hernia,  no evidence of recurrence.\par  [de-identified] : Testes descended bilaterally. [de-identified] : Ambulating without difficulty or assistance

## 2020-09-15 NOTE — ASSESSMENT
[FreeTextEntry1] : 70 year old with chronic Left inguinal/scrotal hernia.   Previous CT images personally reviewed which had contained a small segment of  colon as well as a portion of urinary bladder.   Easily reducible previously.\par \par Today he is asymptomatic and he presents today to discuss elective repair prior to pursuit of XRT for Prostate CA.\par He denies obstructive symptoms.  No nausea, vomiting or change in bowel habits.\par \par Surgical repair is certainly recommended.  Risk, Benefits, and Alternatives to surgery have been discussed.  This includes but is not limited to bleeding, infection, damage to adjacent structures, need for additional surgery or interventions, adverse effects of anesthesia such as cardio-respiratory complications, prolonged intubation, cardiac arrhythmia, arrest, and or death.  Risks of forgoing surgery have also been discussed including progression of, and/or worsening of current condition which may then require urgent or emergent treatment or surgery.\par Open repair of left inguinal hernia to be scheduled once COVID restrictions lifted.  Pre-op medical and cardiology evaluations would be appreciated.  Referred back to his PMD for clearance.\par \par Signs and symptoms of incarceration/strangulation/obstruction have been reviewed with the patient.  Should such symptoms present, urgent medical attention should be sought.  Contact my office immediately and or head to your nearest emergency room for evaluation and treatment.  This may require immediate surgical repair.\par \par Discussed with patient and his daughter.  All questions answered.

## 2020-09-16 ENCOUNTER — NON-APPOINTMENT (OUTPATIENT)
Age: 70
End: 2020-09-16

## 2020-09-16 ENCOUNTER — APPOINTMENT (OUTPATIENT)
Dept: CARDIOLOGY | Facility: CLINIC | Age: 70
End: 2020-09-16
Payer: MEDICARE

## 2020-09-16 VITALS
WEIGHT: 161 LBS | HEART RATE: 86 BPM | SYSTOLIC BLOOD PRESSURE: 145 MMHG | BODY MASS INDEX: 29.63 KG/M2 | HEIGHT: 62 IN | DIASTOLIC BLOOD PRESSURE: 90 MMHG

## 2020-09-16 VITALS — DIASTOLIC BLOOD PRESSURE: 80 MMHG | SYSTOLIC BLOOD PRESSURE: 130 MMHG

## 2020-09-16 PROCEDURE — 99204 OFFICE O/P NEW MOD 45 MIN: CPT

## 2020-09-16 PROCEDURE — 93000 ELECTROCARDIOGRAM COMPLETE: CPT

## 2020-09-17 ENCOUNTER — LABORATORY RESULT (OUTPATIENT)
Age: 70
End: 2020-09-17

## 2020-09-17 ENCOUNTER — APPOINTMENT (OUTPATIENT)
Dept: INTERNAL MEDICINE | Facility: CLINIC | Age: 70
End: 2020-09-17
Payer: MEDICARE

## 2020-09-17 VITALS
TEMPERATURE: 97.3 F | RESPIRATION RATE: 14 BRPM | HEIGHT: 62 IN | SYSTOLIC BLOOD PRESSURE: 118 MMHG | HEART RATE: 84 BPM | WEIGHT: 160 LBS | OXYGEN SATURATION: 97 % | DIASTOLIC BLOOD PRESSURE: 72 MMHG | BODY MASS INDEX: 29.44 KG/M2

## 2020-09-17 DIAGNOSIS — M47.812 SPONDYLOSIS W/OUT MYELOPATHY OR RADICULOPATHY, CERVICAL REGION: ICD-10-CM

## 2020-09-17 PROCEDURE — 99213 OFFICE O/P EST LOW 20 MIN: CPT

## 2020-09-17 NOTE — PHYSICAL EXAM
[Normal] : affect was normal and insight and judgment were intact [de-identified] : no back tenderness. normal ROM of neck.

## 2020-09-17 NOTE — HISTORY OF PRESENT ILLNESS
[FreeTextEntry1] : blood work [de-identified] : Pt states that he needs blood work and he needs to have hernia surgery. He feels good. Denies back or neck pain. \par He is being treated for prostate cancer.

## 2020-09-18 LAB
ALBUMIN SERPL ELPH-MCNC: 3.9 G/DL
ALP BLD-CCNC: 38 U/L
ALT SERPL-CCNC: 14 U/L
ANION GAP SERPL CALC-SCNC: 13 MMOL/L
APPEARANCE: CLEAR
AST SERPL-CCNC: 17 U/L
BACTERIA: ABNORMAL
BASOPHILS # BLD AUTO: 0.08 K/UL
BASOPHILS NFR BLD AUTO: 1 %
BILIRUB SERPL-MCNC: 0.4 MG/DL
BILIRUBIN URINE: NEGATIVE
BLOOD URINE: NEGATIVE
BUN SERPL-MCNC: 34 MG/DL
CALCIUM SERPL-MCNC: 8.8 MG/DL
CHLORIDE SERPL-SCNC: 99 MMOL/L
CO2 SERPL-SCNC: 24 MMOL/L
COLOR: NORMAL
CREAT SERPL-MCNC: 1.16 MG/DL
EOSINOPHIL # BLD AUTO: 0.36 K/UL
EOSINOPHIL NFR BLD AUTO: 4.6 %
FERRITIN SERPL-MCNC: 76 NG/ML
FOLATE SERPL-MCNC: 19 NG/ML
GLUCOSE QUALITATIVE U: NEGATIVE
GLUCOSE SERPL-MCNC: 92 MG/DL
HCT VFR BLD CALC: 38.4 %
HGB BLD-MCNC: 12.4 G/DL
HYALINE CASTS: 1 /LPF
IMM GRANULOCYTES NFR BLD AUTO: 1 %
IRON SATN MFR SERPL: 29 %
IRON SERPL-MCNC: 86 UG/DL
KETONES URINE: NEGATIVE
LEUKOCYTE ESTERASE URINE: ABNORMAL
LYMPHOCYTES # BLD AUTO: 2.56 K/UL
LYMPHOCYTES NFR BLD AUTO: 32.5 %
MAN DIFF?: NORMAL
MCHC RBC-ENTMCNC: 30.5 PG
MCHC RBC-ENTMCNC: 32.3 GM/DL
MCV RBC AUTO: 94.3 FL
MICROSCOPIC-UA: NORMAL
MONOCYTES # BLD AUTO: 0.8 K/UL
MONOCYTES NFR BLD AUTO: 10.2 %
NEUTROPHILS # BLD AUTO: 3.99 K/UL
NEUTROPHILS NFR BLD AUTO: 50.7 %
NITRITE URINE: POSITIVE
PH URINE: 5.5
PHOSPHATE SERPL-MCNC: 3.8 MG/DL
PLATELET # BLD AUTO: 192 K/UL
POTASSIUM SERPL-SCNC: 4 MMOL/L
PROT SERPL-MCNC: 5.7 G/DL
PROTEIN URINE: NEGATIVE
PSA SERPL-MCNC: 10.9 NG/ML
RBC # BLD: 4.07 M/UL
RBC # BLD: 4.07 M/UL
RBC # FLD: 12.8 %
RED BLOOD CELLS URINE: 9 /HPF
RETICS # AUTO: 1.2 %
RETICS AGGREG/RBC NFR: 47.2 K/UL
SODIUM SERPL-SCNC: 136 MMOL/L
SPECIFIC GRAVITY URINE: 1.02
SQUAMOUS EPITHELIAL CELLS: 0 /HPF
TIBC SERPL-MCNC: 293 UG/DL
TSH SERPL-ACNC: 5.13 UIU/ML
UIBC SERPL-MCNC: 208 UG/DL
UROBILINOGEN URINE: NORMAL
VIT B12 SERPL-MCNC: 662 PG/ML
WBC # FLD AUTO: 7.87 K/UL
WHITE BLOOD CELLS URINE: 28 /HPF

## 2020-10-07 ENCOUNTER — OUTPATIENT (OUTPATIENT)
Dept: OUTPATIENT SERVICES | Facility: HOSPITAL | Age: 70
LOS: 1 days | End: 2020-10-07
Payer: MEDICARE

## 2020-10-07 VITALS
DIASTOLIC BLOOD PRESSURE: 85 MMHG | RESPIRATION RATE: 16 BRPM | SYSTOLIC BLOOD PRESSURE: 127 MMHG | OXYGEN SATURATION: 98 % | HEART RATE: 76 BPM | WEIGHT: 164.91 LBS | TEMPERATURE: 97 F | HEIGHT: 66 IN

## 2020-10-07 DIAGNOSIS — Z01.818 ENCOUNTER FOR OTHER PREPROCEDURAL EXAMINATION: ICD-10-CM

## 2020-10-07 DIAGNOSIS — K40.90 UNILATERAL INGUINAL HERNIA, WITHOUT OBSTRUCTION OR GANGRENE, NOT SPECIFIED AS RECURRENT: ICD-10-CM

## 2020-10-07 DIAGNOSIS — K40.90 UNILATERAL INGUINAL HERNIA, WITHOUT OBSTRUCTION OR GANGRENE, NOT SPECIFIED AS RECURRENT: Chronic | ICD-10-CM

## 2020-10-07 LAB
ANION GAP SERPL CALC-SCNC: 8 MMOL/L — SIGNIFICANT CHANGE UP (ref 5–17)
BUN SERPL-MCNC: 45 MG/DL — HIGH (ref 7–23)
CALCIUM SERPL-MCNC: 8.6 MG/DL — SIGNIFICANT CHANGE UP (ref 8.5–10.1)
CHLORIDE SERPL-SCNC: 104 MMOL/L — SIGNIFICANT CHANGE UP (ref 96–108)
CO2 SERPL-SCNC: 27 MMOL/L — SIGNIFICANT CHANGE UP (ref 22–31)
CREAT SERPL-MCNC: 1.2 MG/DL — SIGNIFICANT CHANGE UP (ref 0.5–1.3)
GLUCOSE SERPL-MCNC: 101 MG/DL — HIGH (ref 70–99)
HCT VFR BLD CALC: 38.6 % — LOW (ref 39–50)
HGB BLD-MCNC: 12.7 G/DL — LOW (ref 13–17)
MCHC RBC-ENTMCNC: 30 PG — SIGNIFICANT CHANGE UP (ref 27–34)
MCHC RBC-ENTMCNC: 32.9 GM/DL — SIGNIFICANT CHANGE UP (ref 32–36)
MCV RBC AUTO: 91 FL — SIGNIFICANT CHANGE UP (ref 80–100)
NRBC # BLD: 0 /100 WBCS — SIGNIFICANT CHANGE UP (ref 0–0)
PLATELET # BLD AUTO: 221 K/UL — SIGNIFICANT CHANGE UP (ref 150–400)
POTASSIUM SERPL-MCNC: 4.1 MMOL/L — SIGNIFICANT CHANGE UP (ref 3.5–5.3)
POTASSIUM SERPL-SCNC: 4.1 MMOL/L — SIGNIFICANT CHANGE UP (ref 3.5–5.3)
RBC # BLD: 4.24 M/UL — SIGNIFICANT CHANGE UP (ref 4.2–5.8)
RBC # FLD: 12.9 % — SIGNIFICANT CHANGE UP (ref 10.3–14.5)
SODIUM SERPL-SCNC: 139 MMOL/L — SIGNIFICANT CHANGE UP (ref 135–145)
WBC # BLD: 8.92 K/UL — SIGNIFICANT CHANGE UP (ref 3.8–10.5)
WBC # FLD AUTO: 8.92 K/UL — SIGNIFICANT CHANGE UP (ref 3.8–10.5)

## 2020-10-07 PROCEDURE — 80048 BASIC METABOLIC PNL TOTAL CA: CPT

## 2020-10-07 PROCEDURE — 85027 COMPLETE CBC AUTOMATED: CPT

## 2020-10-07 PROCEDURE — 93005 ELECTROCARDIOGRAM TRACING: CPT

## 2020-10-07 PROCEDURE — 36415 COLL VENOUS BLD VENIPUNCTURE: CPT

## 2020-10-07 PROCEDURE — G0463: CPT

## 2020-10-07 PROCEDURE — 93010 ELECTROCARDIOGRAM REPORT: CPT

## 2020-10-07 NOTE — H&P PST ADULT - ASSESSMENT
71 y/o for open repair of left inguinal hernia.   Medical and cardiac  eval advised.  Pre op instructions:  Hold OTC supplements. Medications reviewed for the week and morning of surgery.  NPO after 11pm to the morning of surgery.  Shower 2 days before and the morning of surgery with antibacterial soap as instructed.  Covid testing information given.  Patient verbalized understanding.   69 y/o for open repair of left inguinal hernia.   Medical and cardiac  eval advised.  Pre op instructions:  Hold OTC supplements. Medications reviewed for the week and morning of surgery.  NPO after 11pm to the morning of surgery.  Shower 2 days before and the morning of surgery with antibacterial soap as instructed.  Covid testing information given.   Patient verbalized understanding. Instructions given to daughter on pts request.

## 2020-10-07 NOTE — H&P PST ADULT - HISTORY OF PRESENT ILLNESS
70 y/o male, PMH of HTN, with c/o of bulging in the left groin for almost a yr, it is growing in size. Was seen by Dr Lubin a week ago, advised to have surgery. Scheduled for open left inguinal hernia repair. pre op testing today. 70 y/o male, PMH of HTN, with c/o of bulging in the left groin for almost a yr, it is growing in size. Had right inguinal hernia a yr ago. Was seen by Dr Lubin a week ago, advised to have surgery. Scheduled for open left inguinal hernia repair. pre op testing today.

## 2020-10-07 NOTE — H&P PST ADULT - NSICDXPROBLEM_GEN_ALL_CORE_FT
PROBLEM DIAGNOSES  Problem: Inguinal hernia, left  Assessment and Plan: open repair of left inguinal hernia.  Covid testing within 3 days prior to surgery. Information given.

## 2020-10-08 ENCOUNTER — APPOINTMENT (OUTPATIENT)
Dept: INTERNAL MEDICINE | Facility: CLINIC | Age: 70
End: 2020-10-08
Payer: MEDICARE

## 2020-10-08 VITALS
RESPIRATION RATE: 14 BRPM | OXYGEN SATURATION: 97 % | WEIGHT: 164 LBS | DIASTOLIC BLOOD PRESSURE: 80 MMHG | HEIGHT: 62 IN | HEART RATE: 74 BPM | TEMPERATURE: 97.7 F | SYSTOLIC BLOOD PRESSURE: 120 MMHG | BODY MASS INDEX: 30.18 KG/M2

## 2020-10-08 DIAGNOSIS — Z01.818 ENCOUNTER FOR OTHER PREPROCEDURAL EXAMINATION: ICD-10-CM

## 2020-10-08 PROCEDURE — 99214 OFFICE O/P EST MOD 30 MIN: CPT

## 2020-10-08 NOTE — PHYSICAL EXAM
[Normal] : affect was normal and insight and judgment were intact [de-identified] : left inguinal hernia [de-identified] : kyphosis

## 2020-10-08 NOTE — HISTORY OF PRESENT ILLNESS
[No Pertinent Cardiac History] : no history of aortic stenosis, atrial fibrillation, coronary artery disease, recent myocardial infarction, or implantable device/pacemaker [No Pertinent Pulmonary History] : no history of asthma, COPD, sleep apnea, or smoking [Chronic Kidney Disease] : chronic kidney disease [(Patient denies any chest pain, claudication, dyspnea on exertion, orthopnea, palpitations or syncope)] : Patient denies any chest pain, claudication, dyspnea on exertion, orthopnea, palpitations or syncope [Family Member] : no family member with adverse anesthesia reaction/sudden death [Self] : no previous adverse anesthesia reaction [Chronic Anticoagulation] : no chronic anticoagulation [Diabetes] : no diabetes [FreeTextEntry1] : hernia surgery [FreeTextEntry2] : 10/19/2020 [FreeTextEntry3] : Dr Lubin [FreeTextEntry4] : Pt is having hernia surgery on 10/19.  He has h/o prostate cancer and has been undergoing treatment. h/o HTN controlled with meds. CKD which is stable.

## 2020-10-08 NOTE — ASSESSMENT
[Patient Optimized for Surgery] : Patient optimized for surgery [FreeTextEntry4] : CKD\par stable \par \par HTN\par good control\par \par hyperlipidemia\par continue diet\par \par compression fractures\par denies pain\par \par optimized medically for surgery\par \par

## 2020-10-16 ENCOUNTER — OUTPATIENT (OUTPATIENT)
Dept: OUTPATIENT SERVICES | Facility: HOSPITAL | Age: 70
LOS: 1 days | End: 2020-10-16
Payer: MEDICARE

## 2020-10-16 DIAGNOSIS — K40.90 UNILATERAL INGUINAL HERNIA, WITHOUT OBSTRUCTION OR GANGRENE, NOT SPECIFIED AS RECURRENT: Chronic | ICD-10-CM

## 2020-10-16 DIAGNOSIS — Z11.59 ENCOUNTER FOR SCREENING FOR OTHER VIRAL DISEASES: ICD-10-CM

## 2020-10-16 LAB — SARS-COV-2 RNA SPEC QL NAA+PROBE: SIGNIFICANT CHANGE UP

## 2020-10-16 PROCEDURE — U0003: CPT

## 2020-10-18 ENCOUNTER — TRANSCRIPTION ENCOUNTER (OUTPATIENT)
Age: 70
End: 2020-10-18

## 2020-10-19 ENCOUNTER — APPOINTMENT (OUTPATIENT)
Dept: SURGERY | Facility: HOSPITAL | Age: 70
End: 2020-10-19

## 2020-10-19 ENCOUNTER — OUTPATIENT (OUTPATIENT)
Dept: OUTPATIENT SERVICES | Facility: HOSPITAL | Age: 70
LOS: 1 days | End: 2020-10-19
Payer: MEDICARE

## 2020-10-19 VITALS
WEIGHT: 160.06 LBS | HEIGHT: 60 IN | DIASTOLIC BLOOD PRESSURE: 84 MMHG | OXYGEN SATURATION: 100 % | SYSTOLIC BLOOD PRESSURE: 140 MMHG | TEMPERATURE: 98 F | HEART RATE: 93 BPM | RESPIRATION RATE: 16 BRPM

## 2020-10-19 VITALS
RESPIRATION RATE: 14 BRPM | OXYGEN SATURATION: 96 % | DIASTOLIC BLOOD PRESSURE: 76 MMHG | TEMPERATURE: 98 F | HEART RATE: 76 BPM | SYSTOLIC BLOOD PRESSURE: 121 MMHG

## 2020-10-19 DIAGNOSIS — Z01.818 ENCOUNTER FOR OTHER PREPROCEDURAL EXAMINATION: ICD-10-CM

## 2020-10-19 DIAGNOSIS — K40.90 UNILATERAL INGUINAL HERNIA, WITHOUT OBSTRUCTION OR GANGRENE, NOT SPECIFIED AS RECURRENT: ICD-10-CM

## 2020-10-19 DIAGNOSIS — K40.90 UNILATERAL INGUINAL HERNIA, WITHOUT OBSTRUCTION OR GANGRENE, NOT SPECIFIED AS RECURRENT: Chronic | ICD-10-CM

## 2020-10-19 PROCEDURE — 49505 PRP I/HERN INIT REDUC >5 YR: CPT | Mod: AS,LT

## 2020-10-19 PROCEDURE — 49505 PRP I/HERN INIT REDUC >5 YR: CPT | Mod: LT

## 2020-10-19 PROCEDURE — C1781: CPT

## 2020-10-19 PROCEDURE — 49525 REPAIR ING HERNIA SLIDING: CPT | Mod: LT

## 2020-10-19 RX ORDER — SODIUM CHLORIDE 9 MG/ML
1000 INJECTION, SOLUTION INTRAVENOUS
Refills: 0 | Status: DISCONTINUED | OUTPATIENT
Start: 2020-10-19 | End: 2020-10-19

## 2020-10-19 RX ORDER — OXYCODONE HYDROCHLORIDE 5 MG/1
5 TABLET ORAL ONCE
Refills: 0 | Status: DISCONTINUED | OUTPATIENT
Start: 2020-10-19 | End: 2020-10-19

## 2020-10-19 RX ORDER — OXYCODONE AND ACETAMINOPHEN 5; 325 MG/1; MG/1
1 TABLET ORAL
Qty: 15 | Refills: 0
Start: 2020-10-19

## 2020-10-19 RX ORDER — HYDROMORPHONE HYDROCHLORIDE 2 MG/ML
0.5 INJECTION INTRAMUSCULAR; INTRAVENOUS; SUBCUTANEOUS
Refills: 0 | Status: DISCONTINUED | OUTPATIENT
Start: 2020-10-19 | End: 2020-10-19

## 2020-10-19 RX ORDER — METOCLOPRAMIDE HCL 10 MG
5 TABLET ORAL ONCE
Refills: 0 | Status: DISCONTINUED | OUTPATIENT
Start: 2020-10-19 | End: 2020-10-19

## 2020-10-19 RX ADMIN — SODIUM CHLORIDE 50 MILLILITER(S): 9 INJECTION, SOLUTION INTRAVENOUS at 08:09

## 2020-10-19 RX ADMIN — HYDROMORPHONE HYDROCHLORIDE 0.5 MILLIGRAM(S): 2 INJECTION INTRAMUSCULAR; INTRAVENOUS; SUBCUTANEOUS at 10:23

## 2020-10-19 RX ADMIN — SODIUM CHLORIDE 110 MILLILITER(S): 9 INJECTION, SOLUTION INTRAVENOUS at 10:24

## 2020-10-19 NOTE — BRIEF OPERATIVE NOTE - ELECTIVE PROCEDURE
Yes [] : The components of the vaccine(s) to be administered today are listed in the plan of care. The disease(s) for which the vaccine(s) are intended to prevent and the risks have been discussed with the caretaker.  The risks are also included in the appropriate vaccination information statements which have been provided to the patient's caregiver.  The caregiver has given consent to vaccinate. [FreeTextEntry1] : D/W caregiver well child visit; reviewed breast feeding or formula feeding with iron fortified formula, advise vitamin D daily supplement for  infants. Reviewed safety recommendations including- back to sleep, rear facing car seat, smoke detectors and carbon dioxide detectors at home; avoid tobacco/vaping/smoking exposure;reviewed solid food introduction between 4-6months of age; reviewed age appropriate development; reviewed and consented vaccinations.\par D/W parents hip click on exam today- frank obtain hip US at 6weeks of life.\par Hypothermia and jaundice in  36.5week infant- pt to return to [facility] for further evaluation of hypothermia and jaundice, reviewed with parents pt has lost 9.3% of birth weight- this may contribute to hypothermia but also may require r/o sepsis w/u; advise breast feeding Q2-3hrs and supplement with 1-2 oz formula; if pt is d/destini home today from ER then f/u in office tomorrow. \par time spent total in face to face contact and coordination of care: 35min\par Pt signed out to [facility]. \par \par

## 2020-10-19 NOTE — ASU DISCHARGE PLAN (ADULT/PEDIATRIC) - CARE PROVIDER_API CALL
Magan Lubin  SURGERY  [address] [place] [address]  Phone: [phone]  Fax: [phone]  Follow Up Time:

## 2020-10-19 NOTE — BRIEF OPERATIVE NOTE - NSICDXBRIEFPROCEDURE_GEN_ALL_CORE_FT
PROCEDURES:  Repair, hernia, inguinal, open, using mesh, adult 19-Oct-2020 10:03:05  India Steward

## 2020-10-19 NOTE — ASU DISCHARGE PLAN (ADULT/PEDIATRIC) - CALL YOUR DOCTOR IF YOU HAVE ANY OF THE FOLLOWING:
Wound/Surgical Site with redness, or foul smelling discharge or pus/Numbness, tingling, color or temperature change to extremity/Unable to urinate/Fever greater than (need to indicate Fahrenheit or Celsius)/Bleeding that does not stop/Swelling that gets worse/Pain not relieved by Medications

## 2020-10-19 NOTE — ASU DISCHARGE PLAN (ADULT/PEDIATRIC) - ASU DC SPECIAL INSTRUCTIONSFT
ice packs to left groin- 20 minutes on and 20 minutes off  May remove dressing in 2-3 days, Leave steri strips intact   May shower in 24-48 hours  No reaching, pulling, pushing, lifting   No strenuous activity

## 2020-10-21 ENCOUNTER — RX RENEWAL (OUTPATIENT)
Age: 70
End: 2020-10-21

## 2020-10-27 ENCOUNTER — APPOINTMENT (OUTPATIENT)
Dept: SURGERY | Facility: CLINIC | Age: 70
End: 2020-10-27
Payer: MEDICARE

## 2020-10-27 VITALS
HEART RATE: 84 BPM | DIASTOLIC BLOOD PRESSURE: 88 MMHG | BODY MASS INDEX: 30.91 KG/M2 | OXYGEN SATURATION: 98 % | HEIGHT: 62 IN | WEIGHT: 168 LBS | SYSTOLIC BLOOD PRESSURE: 143 MMHG | TEMPERATURE: 97.8 F

## 2020-10-27 DIAGNOSIS — Z87.19 PERSONAL HISTORY OF OTHER DISEASES OF THE DIGESTIVE SYSTEM: ICD-10-CM

## 2020-10-27 PROCEDURE — 99024 POSTOP FOLLOW-UP VISIT: CPT

## 2020-10-28 PROBLEM — Z87.19 HISTORY OF LEFT INGUINAL HERNIA: Status: RESOLVED | Noted: 2018-01-29 | Resolved: 2020-10-28

## 2020-10-28 NOTE — PHYSICAL EXAM
[Normal Breath Sounds] : Normal breath sounds [Normal Heart Sounds] : normal heart sounds [Normal Rate and Rhythm] : normal rate and rhythm [No Rash or Lesion] : No rash or lesion [Alert] : alert [Oriented to Person] : oriented to person [Oriented to Place] : oriented to place [Oriented to Time] : oriented to time [Calm] : calm [de-identified] : Healthy appearing gentleman in no distress.   [de-identified] : EVY MIRANDA EOMI [de-identified] : Soft, nontender nondistended, positive bowel sounds in all four quads.  No rebound or guarding. Surgical incision in left groin healing nicely.  No signs of infection.  Minimal resolving ecchymosis at penile base.  No signs of recurrent hernia or masses. [de-identified] : Testes descended bilaterally.  non tender. [de-identified] : Ambulating without difficulty or assistance

## 2020-10-28 NOTE — HISTORY OF PRESENT ILLNESS
[de-identified] : 70 year old with history of sliding left inguinal hernia repaired on 10/19/2020.  Returns today for routine post operative f/u.

## 2020-10-28 NOTE — ASSESSMENT
[FreeTextEntry1] : Unremarkable post op course following open repair of Left sided sliding inguinal hernia (had contained colon and bladder).  Feels well.  no new complaints.\par \par Postoperative instructions have been provided including avoidance of heavy lifting and strenuous activities in excess of 20-25 pounds for duration of 4-6 weeks. The patient may shower keeping the incisions clean, dry, covered as needed.\par \par f/u PRN.

## 2020-11-08 ENCOUNTER — TRANSCRIPTION ENCOUNTER (OUTPATIENT)
Age: 70
End: 2020-11-08

## 2020-11-09 ENCOUNTER — INPATIENT (INPATIENT)
Facility: HOSPITAL | Age: 70
LOS: 0 days | Discharge: HOME | DRG: 856 | End: 2020-11-10
Attending: COLON & RECTAL SURGERY | Admitting: COLON & RECTAL SURGERY
Payer: COMMERCIAL

## 2020-11-09 ENCOUNTER — TRANSCRIPTION ENCOUNTER (OUTPATIENT)
Age: 70
End: 2020-11-09

## 2020-11-09 VITALS
HEART RATE: 96 BPM | RESPIRATION RATE: 18 BRPM | SYSTOLIC BLOOD PRESSURE: 114 MMHG | HEIGHT: 60 IN | TEMPERATURE: 99 F | OXYGEN SATURATION: 96 % | WEIGHT: 149.91 LBS | DIASTOLIC BLOOD PRESSURE: 70 MMHG

## 2020-11-09 DIAGNOSIS — K61.1 RECTAL ABSCESS: ICD-10-CM

## 2020-11-09 DIAGNOSIS — Z98.890 OTHER SPECIFIED POSTPROCEDURAL STATES: Chronic | ICD-10-CM

## 2020-11-09 DIAGNOSIS — K40.90 UNILATERAL INGUINAL HERNIA, WITHOUT OBSTRUCTION OR GANGRENE, NOT SPECIFIED AS RECURRENT: Chronic | ICD-10-CM

## 2020-11-09 LAB
ALBUMIN SERPL ELPH-MCNC: 2.8 G/DL — LOW (ref 3.3–5)
ALP SERPL-CCNC: 68 U/L — SIGNIFICANT CHANGE UP (ref 40–120)
ALT FLD-CCNC: 16 U/L — SIGNIFICANT CHANGE UP (ref 12–78)
ANION GAP SERPL CALC-SCNC: 8 MMOL/L — SIGNIFICANT CHANGE UP (ref 5–17)
AST SERPL-CCNC: 13 U/L — LOW (ref 15–37)
BASOPHILS # BLD AUTO: 0 K/UL — SIGNIFICANT CHANGE UP (ref 0–0.2)
BASOPHILS NFR BLD AUTO: 0 % — SIGNIFICANT CHANGE UP (ref 0–2)
BILIRUB SERPL-MCNC: 1.1 MG/DL — SIGNIFICANT CHANGE UP (ref 0.2–1.2)
BUN SERPL-MCNC: 42 MG/DL — HIGH (ref 7–23)
CALCIUM SERPL-MCNC: 8.8 MG/DL — SIGNIFICANT CHANGE UP (ref 8.5–10.1)
CHLORIDE SERPL-SCNC: 108 MMOL/L — SIGNIFICANT CHANGE UP (ref 96–108)
CO2 SERPL-SCNC: 26 MMOL/L — SIGNIFICANT CHANGE UP (ref 22–31)
CREAT SERPL-MCNC: 1.5 MG/DL — HIGH (ref 0.5–1.3)
EOSINOPHIL # BLD AUTO: 0 K/UL — SIGNIFICANT CHANGE UP (ref 0–0.5)
EOSINOPHIL NFR BLD AUTO: 0 % — SIGNIFICANT CHANGE UP (ref 0–6)
GLUCOSE SERPL-MCNC: 110 MG/DL — HIGH (ref 70–99)
HCT VFR BLD CALC: 38.7 % — LOW (ref 39–50)
HGB BLD-MCNC: 13.1 G/DL — SIGNIFICANT CHANGE UP (ref 13–17)
LACTATE SERPL-SCNC: 1.2 MMOL/L — SIGNIFICANT CHANGE UP (ref 0.7–2)
LIDOCAIN IGE QN: 124 U/L — SIGNIFICANT CHANGE UP (ref 73–393)
LYMPHOCYTES # BLD AUTO: 0.9 K/UL — LOW (ref 1–3.3)
LYMPHOCYTES # BLD AUTO: 4 % — LOW (ref 13–44)
MCHC RBC-ENTMCNC: 30.1 PG — SIGNIFICANT CHANGE UP (ref 27–34)
MCHC RBC-ENTMCNC: 33.9 GM/DL — SIGNIFICANT CHANGE UP (ref 32–36)
MCV RBC AUTO: 89 FL — SIGNIFICANT CHANGE UP (ref 80–100)
MONOCYTES # BLD AUTO: 1.13 K/UL — HIGH (ref 0–0.9)
MONOCYTES NFR BLD AUTO: 5 % — SIGNIFICANT CHANGE UP (ref 2–14)
NEUTROPHILS # BLD AUTO: 19.86 K/UL — HIGH (ref 1.8–7.4)
NEUTROPHILS NFR BLD AUTO: 87 % — HIGH (ref 43–77)
NEUTS BAND # BLD: 1 % — SIGNIFICANT CHANGE UP (ref 0–8)
NRBC # BLD: 0 — SIGNIFICANT CHANGE UP
NRBC # BLD: SIGNIFICANT CHANGE UP /100 WBCS (ref 0–0)
PLAT MORPH BLD: NORMAL — SIGNIFICANT CHANGE UP
PLATELET # BLD AUTO: 237 K/UL — SIGNIFICANT CHANGE UP (ref 150–400)
POTASSIUM SERPL-MCNC: 3.8 MMOL/L — SIGNIFICANT CHANGE UP (ref 3.5–5.3)
POTASSIUM SERPL-SCNC: 3.8 MMOL/L — SIGNIFICANT CHANGE UP (ref 3.5–5.3)
PROT SERPL-MCNC: 7.3 G/DL — SIGNIFICANT CHANGE UP (ref 6–8.3)
RBC # BLD: 4.35 M/UL — SIGNIFICANT CHANGE UP (ref 4.2–5.8)
RBC # FLD: 14.2 % — SIGNIFICANT CHANGE UP (ref 10.3–14.5)
RBC BLD AUTO: NORMAL — SIGNIFICANT CHANGE UP
SARS-COV-2 RNA SPEC QL NAA+PROBE: SIGNIFICANT CHANGE UP
SODIUM SERPL-SCNC: 142 MMOL/L — SIGNIFICANT CHANGE UP (ref 135–145)
VARIANT LYMPHS # BLD: 3 % — SIGNIFICANT CHANGE UP (ref 0–6)
WBC # BLD: 22.57 K/UL — HIGH (ref 3.8–10.5)
WBC # FLD AUTO: 22.57 K/UL — HIGH (ref 3.8–10.5)

## 2020-11-09 PROCEDURE — 99222 1ST HOSP IP/OBS MODERATE 55: CPT | Mod: 57

## 2020-11-09 PROCEDURE — 74177 CT ABD & PELVIS W/CONTRAST: CPT | Mod: 26

## 2020-11-09 PROCEDURE — 93010 ELECTROCARDIOGRAM REPORT: CPT

## 2020-11-09 PROCEDURE — 46045 I&D ABSC TRANAL UNDER ANES: CPT

## 2020-11-09 PROCEDURE — 71045 X-RAY EXAM CHEST 1 VIEW: CPT | Mod: 26

## 2020-11-09 PROCEDURE — 99223 1ST HOSP IP/OBS HIGH 75: CPT

## 2020-11-09 PROCEDURE — 99284 EMERGENCY DEPT VISIT MOD MDM: CPT

## 2020-11-09 PROCEDURE — 46020 PLACEMENT OF SETON: CPT

## 2020-11-09 RX ORDER — OXYCODONE HYDROCHLORIDE 5 MG/1
5 TABLET ORAL ONCE
Refills: 0 | Status: DISCONTINUED | OUTPATIENT
Start: 2020-11-09 | End: 2020-11-10

## 2020-11-09 RX ORDER — PANTOPRAZOLE SODIUM 20 MG/1
40 TABLET, DELAYED RELEASE ORAL
Refills: 0 | Status: DISCONTINUED | OUTPATIENT
Start: 2020-11-09 | End: 2020-11-10

## 2020-11-09 RX ORDER — ONDANSETRON 8 MG/1
4 TABLET, FILM COATED ORAL ONCE
Refills: 0 | Status: DISCONTINUED | OUTPATIENT
Start: 2020-11-09 | End: 2020-11-10

## 2020-11-09 RX ORDER — HYDROMORPHONE HYDROCHLORIDE 2 MG/ML
0.5 INJECTION INTRAMUSCULAR; INTRAVENOUS; SUBCUTANEOUS EVERY 4 HOURS
Refills: 0 | Status: DISCONTINUED | OUTPATIENT
Start: 2020-11-09 | End: 2020-11-10

## 2020-11-09 RX ORDER — OMEPRAZOLE 10 MG/1
1 CAPSULE, DELAYED RELEASE ORAL
Qty: 0 | Refills: 0 | DISCHARGE

## 2020-11-09 RX ORDER — SODIUM CHLORIDE 9 MG/ML
2000 INJECTION INTRAMUSCULAR; INTRAVENOUS; SUBCUTANEOUS ONCE
Refills: 0 | Status: COMPLETED | OUTPATIENT
Start: 2020-11-09 | End: 2020-11-09

## 2020-11-09 RX ORDER — HYDROMORPHONE HYDROCHLORIDE 2 MG/ML
1 INJECTION INTRAMUSCULAR; INTRAVENOUS; SUBCUTANEOUS EVERY 4 HOURS
Refills: 0 | Status: DISCONTINUED | OUTPATIENT
Start: 2020-11-09 | End: 2020-11-10

## 2020-11-09 RX ORDER — PIPERACILLIN AND TAZOBACTAM 4; .5 G/20ML; G/20ML
3.38 INJECTION, POWDER, LYOPHILIZED, FOR SOLUTION INTRAVENOUS EVERY 8 HOURS
Refills: 0 | Status: DISCONTINUED | OUTPATIENT
Start: 2020-11-09 | End: 2020-11-10

## 2020-11-09 RX ORDER — LISINOPRIL 2.5 MG/1
20 TABLET ORAL DAILY
Refills: 0 | Status: DISCONTINUED | OUTPATIENT
Start: 2020-11-09 | End: 2020-11-10

## 2020-11-09 RX ORDER — AZTREONAM 2 G
1000 VIAL (EA) INJECTION ONCE
Refills: 0 | Status: COMPLETED | OUTPATIENT
Start: 2020-11-09 | End: 2020-11-09

## 2020-11-09 RX ORDER — MORPHINE SULFATE 50 MG/1
4 CAPSULE, EXTENDED RELEASE ORAL ONCE
Refills: 0 | Status: DISCONTINUED | OUTPATIENT
Start: 2020-11-09 | End: 2020-11-09

## 2020-11-09 RX ORDER — LISINOPRIL 2.5 MG/1
1 TABLET ORAL
Qty: 0 | Refills: 0 | DISCHARGE

## 2020-11-09 RX ORDER — BUPIVACAINE 13.3 MG/ML
20 INJECTION, SUSPENSION, LIPOSOMAL INFILTRATION ONCE
Refills: 0 | Status: DISCONTINUED | OUTPATIENT
Start: 2020-11-09 | End: 2020-11-10

## 2020-11-09 RX ORDER — SODIUM CHLORIDE 9 MG/ML
1000 INJECTION INTRAMUSCULAR; INTRAVENOUS; SUBCUTANEOUS
Refills: 0 | Status: DISCONTINUED | OUTPATIENT
Start: 2020-11-09 | End: 2020-11-10

## 2020-11-09 RX ORDER — MORPHINE SULFATE 50 MG/1
1 CAPSULE, EXTENDED RELEASE ORAL
Refills: 0 | Status: DISCONTINUED | OUTPATIENT
Start: 2020-11-09 | End: 2020-11-10

## 2020-11-09 RX ORDER — SODIUM CHLORIDE 9 MG/ML
1000 INJECTION INTRAMUSCULAR; INTRAVENOUS; SUBCUTANEOUS ONCE
Refills: 0 | Status: COMPLETED | OUTPATIENT
Start: 2020-11-09 | End: 2020-11-09

## 2020-11-09 RX ORDER — ONDANSETRON 8 MG/1
4 TABLET, FILM COATED ORAL ONCE
Refills: 0 | Status: COMPLETED | OUTPATIENT
Start: 2020-11-09 | End: 2020-11-09

## 2020-11-09 RX ORDER — ONDANSETRON 8 MG/1
4 TABLET, FILM COATED ORAL EVERY 6 HOURS
Refills: 0 | Status: DISCONTINUED | OUTPATIENT
Start: 2020-11-09 | End: 2020-11-10

## 2020-11-09 RX ORDER — ACETAMINOPHEN 500 MG
1000 TABLET ORAL ONCE
Refills: 0 | Status: COMPLETED | OUTPATIENT
Start: 2020-11-09 | End: 2020-11-09

## 2020-11-09 RX ORDER — ACETAMINOPHEN 500 MG
650 TABLET ORAL EVERY 6 HOURS
Refills: 0 | Status: DISCONTINUED | OUTPATIENT
Start: 2020-11-09 | End: 2020-11-10

## 2020-11-09 RX ORDER — SODIUM CHLORIDE 9 MG/ML
1000 INJECTION, SOLUTION INTRAVENOUS
Refills: 0 | Status: DISCONTINUED | OUTPATIENT
Start: 2020-11-09 | End: 2020-11-10

## 2020-11-09 RX ADMIN — MORPHINE SULFATE 4 MILLIGRAM(S): 50 CAPSULE, EXTENDED RELEASE ORAL at 15:22

## 2020-11-09 RX ADMIN — ONDANSETRON 4 MILLIGRAM(S): 8 TABLET, FILM COATED ORAL at 11:05

## 2020-11-09 RX ADMIN — SODIUM CHLORIDE 75 MILLILITER(S): 9 INJECTION, SOLUTION INTRAVENOUS at 21:54

## 2020-11-09 RX ADMIN — Medication 50 MILLIGRAM(S): at 19:27

## 2020-11-09 RX ADMIN — SODIUM CHLORIDE 1000 MILLILITER(S): 9 INJECTION INTRAMUSCULAR; INTRAVENOUS; SUBCUTANEOUS at 10:30

## 2020-11-09 RX ADMIN — Medication 400 MILLIGRAM(S): at 22:37

## 2020-11-09 RX ADMIN — MORPHINE SULFATE 4 MILLIGRAM(S): 50 CAPSULE, EXTENDED RELEASE ORAL at 11:05

## 2020-11-09 RX ADMIN — SODIUM CHLORIDE 2000 MILLILITER(S): 9 INJECTION INTRAMUSCULAR; INTRAVENOUS; SUBCUTANEOUS at 19:28

## 2020-11-09 RX ADMIN — SODIUM CHLORIDE 1000 MILLILITER(S): 9 INJECTION INTRAMUSCULAR; INTRAVENOUS; SUBCUTANEOUS at 12:00

## 2020-11-09 NOTE — H&P ADULT - ASSESSMENT
70 Y.O. M with PMHx of HTN, HLD s/p Left inguinal hernia repair with mesh on 10/19/20-uncomplicated, presents with 2 day hx of kit-rectal pain, drainage and fevers/chills (did not check temp but took tylenol)    -Admit to CRS  -CT Abd/pelvis reveals No bowel obstruction. Bilateral perirectal abscesses containing fluid and gas measuring 5.0 x 1.4 cm on the left., and 3.9 x 1.4 cm on the right. NO EVIDENCE OF APPENDICITIS- D/w Dr. Frias- will addend, dictation error.  -Continue IV Aztreonam  -Leukocytosis to 22, Lactate 1.2, hemodynamically stable at present, supportive care, NPO, IVF  -Analgesia  -Obtain Medicine clearnce- d/w Dr. Thompson  -To OR tonight after Covid resulted with Dr. Solis/Dr. Solano for I&D of bilateral kit-rectal abscesses

## 2020-11-09 NOTE — H&P ADULT - HISTORY OF PRESENT ILLNESS
70y Male Presents to [facility] ED with 2 day hx of of kit-rectal pain and associated fevers/chills. Pt denies any outpatient antibiotic use. Pt denies previous hx of similar episodes. Pt reports noticing [purulent drainage on underwear. Pt s/p Left inguinal hernia with mesh on 10/19/20- tolerated procedure well with no post-operative complications. Pt denies any hx of constipation/diarrhea or change in bowel habits. Denies any melana or hematochezia. Last ate- last PM.      Bactrim (Anaphylaxis)  penicillin (Anaphylaxis)  sulfa drugs (Unknown)       PAST MEDICAL & SURGICAL HISTORY:  GERD (gastroesophageal reflux disease)    Hypertension    Inguinal hernia, right

## 2020-11-09 NOTE — ED PROVIDER NOTE - OBJECTIVE STATEMENT
pt is a 69 yo m who has no sig pmhx pshx whose pmd is dr daniels, he endorses 2 days of rectal pain and this am he took a bath and then drained the wound with pus now having some discomfort in his rectum and is asking for surgical drainage of abscess.   no abd pain no fever no chills no cp no other sx

## 2020-11-09 NOTE — H&P ADULT - NSHPLABSRESULTS_GEN_ALL_CORE
(11-09 @ 10:59)                      13.1  22.57 )-----------( 237                 38.7    Neutrophils = 19.86 (87.0%)  Lymphocytes = 0.90 (4.0%)  Eosinophils = 0.00 (0.0%)  Basophils = 0.00 (0.0%)  Monocytes = 1.13 (5.0%)  Bands = 1.0%    11-09    142  |  108  |  42<H>  ----------------------------<  110<H>  3.8   |  26  |  1.50<H>    Ca    8.8      09 Nov 2020 10:59    TPro  7.3  /  Alb  2.8<L>  /  TBili  1.1  /  DBili  x   /  AST  13<L>  /  ALT  16  /  AlkPhos  68  11-09    < from: CT Abdomen and Pelvis w/ IV Cont (11.09.20 @ 13:10) >      EXAM:  CT ABDOMEN AND PELVIS IC                            PROCEDURE DATE:  11/09/2020          INTERPRETATION:  CLINICAL INFORMATION: Rectal pain concern for perirectal abscess    COMPARISON: 11/10/2019    PROCEDURE:  CT of the Abdomen and Pelvis was performed with intravenous contrast.  Intravenous contrast: 90 ml Omnipaque 350. 10 ml discarded.  Oral contrast: None.  Sagittal and coronal reformats were performed.    FINDINGS:  LOWER CHEST: Within normal limits.    LIVER: Within normal limits.  BILE DUCTS: Normal caliber.  GALLBLADDER: Within normal limits.  SPLEEN: Within normal limits.  PANCREAS: Within normal limits.  ADRENALS: Within normal limits.  KIDNEYS/URETERS: Bilateral parapelvic cysts.    BLADDER: Nonspecific focal thickening of the anterior bladder wall could be reactive/inflammatory. Cannot exclude neoplastic etiology. Correlate with ultrasound, if necessary, cystoscopy  REPRODUCTIVE ORGANS: Normal size prostate for age with coarse calcifications.  BOWEL: No bowel obstruction. Bilateral perirectal abscesses containing fluid and gas measuring 5.0 x 1.4 cm on the left., and 3.9 x 1.4 cm on the right. NO EVIDENCE OF APPENDICITIS- D/w Dr. Frias- will addend, dictation error  PERITONEUM: No ascites.  VESSELS: Normal caliber  RETROPERITONEUM/LYMPH NODES: No lymphadenopathy.  ABDOMINAL WALL: Tiny fat-containing umbilical hernia. Hernia plug in the left lower pelvis anteriorly  BONES: Degenerative changes. Moderate compression. L5 probably chronic. There is a lytic process involving L1 vertebral body with mild retropulsion, and T10 vertebral body associated with  severe compression deformities. These could be neoplastic or inflammatory. Correlate with MRI. There is moderate chronic T11 compression deformity.    IMPRESSION:  Bilateral perirectal abscesses as discussed.  Nonspecific focal thickening of the anterior bladder wall could be reactive/inflammatory. Cannot exclude neoplastic etiology. Correlate with ultrasound.  Thoracolumbar compression deformities and lytic foci as discussed. Correlate with MR EILEEN FRIAS MD; Attending Radiologist  This document has been electronically signed. Nov 9 2020  1:54PM    < end of copied text >

## 2020-11-09 NOTE — CONSULT NOTE ADULT - ASSESSMENT
70y Male Presents to [facility] ED with 2 day hx of of kit-rectal pain and associated fevers/chills, being admitted for perirectal abscess, scheduled for I+D    #Sepsis 2/2 Perirectal abscess  -Admitted to Surgery  -Patient meets sepsis criteria based on elevated HR and leukocytosis and identifiable source  -CT abd/pelvis showing bilateral perirectal abscesses as discussed.   -IVF- received 3L, continue gentle IVF hydration while patient NPO  -Continue IV antibiotics, f/u blood cx  -Patient scheduled for I+D in OR, NPO except meds for OR  -Patient with RCRI score 0, had recent surgical procedure without complications. Patient with >4METs. Patient is low risk for planned procedure and is medically optimized  -Would obtain culture from surgical site  -Recommend ID consult  -Pain control as per primary team  -Monitor AM CBC, BMP    #Thickening of bladder wall  -Nonspecific focal thickening of the anterior bladder wall could be reactive/inflammatory. Cannot exclude neoplastic etiology. Correlate with ultrasound  -Discussed findings with patient and advised patient to follow up as outpatient    #HTN  -BP well controlled  -Continue home med- lisinopril    #GERD  -Continue omeprazole    #DVT ppx  -SCDs    Thank you for the courtesy of this consult, will follow with you

## 2020-11-09 NOTE — H&P ADULT - ATTENDING COMMENTS
Seen and examined.  Phone call with pt's daughter. Had TRUS to dx prostate cancer 1 month prior. 2 weeks earlier, had LIHR.  2 day h/o anorectal pain that was worsening which prompted ED visit.  WBC elevated at 22. CT with findings of anterior horseshoe abscess, L>R.  AFVSS  Labwork and CT reviewed  Given size of abscess, will plan for EUA, drainage of abscess, possible seton placement and pudendal nerve block.  R/B/A discussed with pt.  He consents to proceed.

## 2020-11-09 NOTE — H&P ADULT - NSHPREVIEWOFSYSTEMS_GEN_ALL_CORE
REVIEW OF SYSTEM:  CONSTITUTIONAL: (+) Chills  EYES/ENT: No visual changes;  No vertigo or throat pain.  NECK: No pain or stiffness  RESPIRATORY: No cough, wheezing, hemoptysis; No shortness of breath  CARDIOVASCULAR: No chest pain or palpitations  GASTROINTESTINAL: No abdominal or epigastric pain. No nausea, vomiting, or hematemesis; No diarrhea or constipation. No melena or hematochezia.  GENITOURINARY: No dysuria, frequency or hematuria  NEUROLOGICAL: No numbness or weakness  SKIN: Penny-rectal abscess- see HPI  All other review of systems is negative unless indicated above.

## 2020-11-09 NOTE — ED ADULT NURSE NOTE - ED STAT RN HANDOFF DETAILS
Assumed care of pt from previous nurse, Mukund Rn in rm 17 A a/o x4, skin warm and dry, + sensation, + capillary refill <2 sec, No respiratory distress noted, even and unlabored breathing, abd soft BS+ all 4 quads nontender IV access noted on right a/c, flushes with ease. NPO status maintained. COVID swab sent.

## 2020-11-09 NOTE — ED PROVIDER NOTE - GASTROINTESTINAL, MLM
Abdomen soft, non-tender, no guarding no rebound, pt has on rectal exam NO abscess no pain or tenderness pt has a skin tag

## 2020-11-09 NOTE — H&P ADULT - NSHPPHYSICALEXAM_GEN_ALL_CORE
Vital Signs Last 24 Hrs  T(C): 37 (09 Nov 2020 10:30), Max: 37.2 (09 Nov 2020 08:51)  T(F): 98.6 (09 Nov 2020 10:30), Max: 99 (09 Nov 2020 08:51)  HR: 86 (09 Nov 2020 11:21) (86 - 96)  BP: 114/70 (09 Nov 2020 11:21) (114/70 - 116/68)  BP(mean): --  RR: 16 (09 Nov 2020 11:21) (16 - 18)  SpO2: 97% (09 Nov 2020 11:21) (96% - 97%)    MEDICATIONS  (STANDING):  aztreonam  IVPB 1000 milliGRAM(s) IV Intermittent once  sodium chloride 0.9% Bolus 2000 milliLiter(s) IV Bolus once    PHYSICAL EXAM:  GENERAL: NAD, well-groomed, well-developed, with rigors/chills  HEAD:  Atraumatic, Normocephalic  EYES: EOMI, conjunctiva and sclera clear  HEART: Regular rate and rhythm; No murmurs, rubs, or gallops  RESPIRATORY: CTA B/L, No W/R/R  ABDOMEN: Soft, Nontender, Nondistended; Bowel sounds present  NEUROLOGY: A&Ox3, nonfocal  EXTREMITIES: calves soft bilaterally no ttp  Kit-rectal region- (+) 2cm x 2cm area of erythema/induration to Right kit-rectal region, Left buttock with small 1cm region of induration, +tenderness to palpation, serous drainage to adjacent with no distinct/obvious sinus tract, +external hemorrhoids  SKIN: warm, dry, normal color, no rash or abnormal lesions

## 2020-11-09 NOTE — BRIEF OPERATIVE NOTE - OPERATION/FINDINGS
Anterior horseshoe abscess drained per rectum by entering intersphincteric plane and performing internal fistulotomy. Horseshoe cavity Irrigated copiously with NS. Pus cultured. B/L setons placed in ischioanal fossas.

## 2020-11-09 NOTE — ED PROVIDER NOTE - PROGRESS NOTE DETAILS
seen by surgery admitted for dx dw on aung [id number] [place] pt was just operated on by dr henderson for hernia- dw dr henderson- pt to be operated on by colorectal asks for dr Solis to be consulted

## 2020-11-09 NOTE — CONSULT NOTE ADULT - SUBJECTIVE AND OBJECTIVE BOX
Patient is a 70y old  Male who presents with a chief complaint of rectal pain    FROM ADMISSION H+P:   HPI:  70y Male Presents to [facility] ED with 2 day hx of of kit-rectal pain and associated fevers/chills. Pt denies any outpatient antibiotic use. Pt denies previous hx of similar episodes. Pt reports noticing purulent drainage on underwear. Pt s/p Left inguinal hernia with mesh on 10/19/20- tolerated procedure well with no post-operative complications. Pt denies any hx of constipation/diarrhea or change in bowel habits. Denies any melana or hematochezia. Last ate- last PM.  Patient with PMHx of HTN and GERD. Denies any current CP, SOB, abd pain, N/V/D/C. Admits to rectal pain.      Bactrim (Anaphylaxis)  penicillin (Anaphylaxis)  sulfa drugs (Unknown)       PAST MEDICAL & SURGICAL HISTORY:  GERD (gastroesophageal reflux disease)    Hypertension    Inguinal hernia, right      ----  PAST MEDICAL & SURGICAL HISTORY:  GERD (gastroesophageal reflux disease)    Hypertension    H/O inguinal hernia repair  Left 10/19/20    Inguinal hernia, right        ----  Home medications:    Patient states he takes one medicine for his HTN and one medication for his GERD, but can't remember the names    ----  FAMILY HISTORY:  No pertinent family history in first degree relatives        ----  Allergies    Bactrim (Anaphylaxis)  penicillin (Anaphylaxis)  sulfa drugs (Unknown)    Intolerances        ----  Social History:  - etoh: denies  - tobacco: never smoker  - recreational drug use: denies  - living circumstances: lives alone  - ambulation status: independent    ----  REVIEW OF SYSTEMS:  CONSTITUTIONAL: admits fever, fatigue, weakness  HEENT: denies blurred vision, sore throat  SKIN: admits rectal swelling/pain  CARDIOVASCULAR: denies chest pain, chest pressure, palpitations  RESPIRATORY: denies shortness of breath, sputum production  GASTROINTESTINAL: denies nausea, vomiting, diarrhea, abdominal pain, admits rectal swelling/pain  GENITOURINARY: denies dysuria, discharge  NEUROLOGICAL: denies numbness, headache, focal weakness  MUSCULOSKELETAL: denies new joint pain, muscle aches  HEMATOLOGIC: denies gross bleeding, bruising  PSYCHIATRIC: denies recent changes in anxiety, depression  ENDOCRINOLOGIC: denies sweating, cold or heat intolerance    ----  PHYSICAL EXAM:  GENERAL: patient appears well, no acute distress, appropriately interactive  EYES: sclera clear, no exudates  ENMT: oropharynx clear without erythema, moist mucous membranes  NECK: supple, soft, no thyromegaly noted  LUNGS: good air entry bilaterally, clear to auscultation, symmetric breath sounds, no wheezing or rhonchi appreciated  HEART: soft S1/S2, regular rate and rhythm, no murmurs noted, no noted edema to b/l LE  GASTROINTESTINAL: abdomen is soft, nontender, nondistended, normoactive bowel sounds, no palpable masses. Perirectal area noted to be erythematous and TTP  MUSCULOSKELETAL: no clubbing or cyanosis, no obvious deformity  NEUROLOGIC: awake, alert, oriented x3, good muscle tone in 4 extremities, no obvious sensory deficits  PSYCHIATRIC: mood is good, affect is congruent with mood, linear and logical thought process    T(C): 37 (11-09-20 @ 10:30), Max: 37.2 (11-09-20 @ 08:51)  HR: 96 (11-09-20 @ 17:24) (86 - 96)  BP: 122/71 (11-09-20 @ 17:24) (114/70 - 122/71)  RR: 16 (11-09-20 @ 17:24) (16 - 18)  SpO2: 97% (11-09-20 @ 17:24) (96% - 97%)  Wt(kg): --    ----  I&O's Summary      LABS:                        13.1   22.57 )-----------( 237      ( 09 Nov 2020 10:59 )             38.7     11-09    142  |  108  |  42<H>  ----------------------------<  110<H>  3.8   |  26  |  1.50<H>    Ca    8.8      09 Nov 2020 10:59    TPro  7.3  /  Alb  2.8<L>  /  TBili  1.1  /  DBili  x   /  AST  13<L>  /  ALT  16  /  AlkPhos  68  11-09        CAPILLARY BLOOD GLUCOSE                    ----  Personally reviewed:  Vital sign trends: [ x ] yes    [  ] no     [  ] n/a  Laboratory results: [ x ] yes    [  ] no     [  ] n/a  Radiology results: [ x ] yes    [  ] no     [  ] n/a

## 2020-11-10 ENCOUNTER — TRANSCRIPTION ENCOUNTER (OUTPATIENT)
Age: 70
End: 2020-11-10

## 2020-11-10 VITALS
OXYGEN SATURATION: 96 % | DIASTOLIC BLOOD PRESSURE: 88 MMHG | SYSTOLIC BLOOD PRESSURE: 134 MMHG | HEART RATE: 79 BPM | RESPIRATION RATE: 18 BRPM | TEMPERATURE: 97 F

## 2020-11-10 LAB
ANION GAP SERPL CALC-SCNC: 6 MMOL/L — SIGNIFICANT CHANGE UP (ref 5–17)
BASOPHILS # BLD AUTO: 0.06 K/UL — SIGNIFICANT CHANGE UP (ref 0–0.2)
BASOPHILS NFR BLD AUTO: 0.3 % — SIGNIFICANT CHANGE UP (ref 0–2)
BUN SERPL-MCNC: 32 MG/DL — HIGH (ref 7–23)
CALCIUM SERPL-MCNC: 7.7 MG/DL — LOW (ref 8.5–10.1)
CHLORIDE SERPL-SCNC: 113 MMOL/L — HIGH (ref 96–108)
CO2 SERPL-SCNC: 25 MMOL/L — SIGNIFICANT CHANGE UP (ref 22–31)
CREAT SERPL-MCNC: 1.1 MG/DL — SIGNIFICANT CHANGE UP (ref 0.5–1.3)
CULTURE RESULTS: SIGNIFICANT CHANGE UP
EOSINOPHIL # BLD AUTO: 0.32 K/UL — SIGNIFICANT CHANGE UP (ref 0–0.5)
EOSINOPHIL NFR BLD AUTO: 1.7 % — SIGNIFICANT CHANGE UP (ref 0–6)
GLUCOSE SERPL-MCNC: 149 MG/DL — HIGH (ref 70–99)
HCT VFR BLD CALC: 31.1 % — LOW (ref 39–50)
HCV AB S/CO SERPL IA: 0.07 S/CO — SIGNIFICANT CHANGE UP (ref 0–0.99)
HCV AB SERPL-IMP: SIGNIFICANT CHANGE UP
HGB BLD-MCNC: 10.4 G/DL — LOW (ref 13–17)
IMM GRANULOCYTES NFR BLD AUTO: 1.4 % — SIGNIFICANT CHANGE UP (ref 0–1.5)
LYMPHOCYTES # BLD AUTO: 1.97 K/UL — SIGNIFICANT CHANGE UP (ref 1–3.3)
LYMPHOCYTES # BLD AUTO: 10.3 % — LOW (ref 13–44)
MCHC RBC-ENTMCNC: 30.3 PG — SIGNIFICANT CHANGE UP (ref 27–34)
MCHC RBC-ENTMCNC: 33.4 GM/DL — SIGNIFICANT CHANGE UP (ref 32–36)
MCV RBC AUTO: 90.7 FL — SIGNIFICANT CHANGE UP (ref 80–100)
MONOCYTES # BLD AUTO: 1.12 K/UL — HIGH (ref 0–0.9)
MONOCYTES NFR BLD AUTO: 5.9 % — SIGNIFICANT CHANGE UP (ref 2–14)
NEUTROPHILS # BLD AUTO: 15.38 K/UL — HIGH (ref 1.8–7.4)
NEUTROPHILS NFR BLD AUTO: 80.4 % — HIGH (ref 43–77)
NRBC # BLD: 0 /100 WBCS — SIGNIFICANT CHANGE UP (ref 0–0)
PLATELET # BLD AUTO: 189 K/UL — SIGNIFICANT CHANGE UP (ref 150–400)
POTASSIUM SERPL-MCNC: 3.5 MMOL/L — SIGNIFICANT CHANGE UP (ref 3.5–5.3)
POTASSIUM SERPL-SCNC: 3.5 MMOL/L — SIGNIFICANT CHANGE UP (ref 3.5–5.3)
RBC # BLD: 3.43 M/UL — LOW (ref 4.2–5.8)
RBC # FLD: 14.4 % — SIGNIFICANT CHANGE UP (ref 10.3–14.5)
SARS-COV-2 IGG SERPL QL IA: NEGATIVE — SIGNIFICANT CHANGE UP
SARS-COV-2 IGM SERPL IA-ACNC: 0.09 INDEX — SIGNIFICANT CHANGE UP
SODIUM SERPL-SCNC: 144 MMOL/L — SIGNIFICANT CHANGE UP (ref 135–145)
SPECIMEN SOURCE: SIGNIFICANT CHANGE UP
WBC # BLD: 19.11 K/UL — HIGH (ref 3.8–10.5)
WBC # FLD AUTO: 19.11 K/UL — HIGH (ref 3.8–10.5)

## 2020-11-10 PROCEDURE — 83690 ASSAY OF LIPASE: CPT

## 2020-11-10 PROCEDURE — 96374 THER/PROPH/DIAG INJ IV PUSH: CPT

## 2020-11-10 PROCEDURE — 87070 CULTURE OTHR SPECIMN AEROBIC: CPT

## 2020-11-10 PROCEDURE — 96375 TX/PRO/DX INJ NEW DRUG ADDON: CPT

## 2020-11-10 PROCEDURE — 87040 BLOOD CULTURE FOR BACTERIA: CPT

## 2020-11-10 PROCEDURE — 74177 CT ABD & PELVIS W/CONTRAST: CPT

## 2020-11-10 PROCEDURE — 46060 I&D ISCHIORECTAL/NTRMRL ABSC: CPT

## 2020-11-10 PROCEDURE — 86769 SARS-COV-2 COVID-19 ANTIBODY: CPT

## 2020-11-10 PROCEDURE — 99238 HOSP IP/OBS DSCHRG MGMT 30/<: CPT

## 2020-11-10 PROCEDURE — 83605 ASSAY OF LACTIC ACID: CPT

## 2020-11-10 PROCEDURE — 86901 BLOOD TYPING SEROLOGIC RH(D): CPT

## 2020-11-10 PROCEDURE — U0003: CPT

## 2020-11-10 PROCEDURE — 99285 EMERGENCY DEPT VISIT HI MDM: CPT | Mod: 25

## 2020-11-10 PROCEDURE — 36415 COLL VENOUS BLD VENIPUNCTURE: CPT

## 2020-11-10 PROCEDURE — 80053 COMPREHEN METABOLIC PANEL: CPT

## 2020-11-10 PROCEDURE — 71045 X-RAY EXAM CHEST 1 VIEW: CPT

## 2020-11-10 PROCEDURE — 80048 BASIC METABOLIC PNL TOTAL CA: CPT

## 2020-11-10 PROCEDURE — 93005 ELECTROCARDIOGRAM TRACING: CPT

## 2020-11-10 PROCEDURE — 86900 BLOOD TYPING SEROLOGIC ABO: CPT

## 2020-11-10 PROCEDURE — 96361 HYDRATE IV INFUSION ADD-ON: CPT

## 2020-11-10 PROCEDURE — 86850 RBC ANTIBODY SCREEN: CPT

## 2020-11-10 PROCEDURE — 85025 COMPLETE CBC W/AUTO DIFF WBC: CPT

## 2020-11-10 PROCEDURE — 86803 HEPATITIS C AB TEST: CPT

## 2020-11-10 RX ORDER — SODIUM CHLORIDE 9 MG/ML
500 INJECTION INTRAMUSCULAR; INTRAVENOUS; SUBCUTANEOUS ONCE
Refills: 0 | Status: COMPLETED | OUTPATIENT
Start: 2020-11-10 | End: 2020-11-10

## 2020-11-10 RX ORDER — METRONIDAZOLE 500 MG
1 TABLET ORAL
Qty: 10 | Refills: 0
Start: 2020-11-10

## 2020-11-10 RX ORDER — AZTREONAM 2 G
1000 VIAL (EA) INJECTION EVERY 8 HOURS
Refills: 0 | Status: DISCONTINUED | OUTPATIENT
Start: 2020-11-10 | End: 2020-11-10

## 2020-11-10 RX ORDER — OXYCODONE AND ACETAMINOPHEN 5; 325 MG/1; MG/1
2 TABLET ORAL EVERY 6 HOURS
Refills: 0 | Status: DISCONTINUED | OUTPATIENT
Start: 2020-11-10 | End: 2020-11-10

## 2020-11-10 RX ORDER — OXYCODONE AND ACETAMINOPHEN 5; 325 MG/1; MG/1
1 TABLET ORAL EVERY 4 HOURS
Refills: 0 | Status: DISCONTINUED | OUTPATIENT
Start: 2020-11-10 | End: 2020-11-10

## 2020-11-10 RX ORDER — INFLUENZA VIRUS VACCINE 15; 15; 15; 15 UG/.5ML; UG/.5ML; UG/.5ML; UG/.5ML
0.5 SUSPENSION INTRAMUSCULAR ONCE
Refills: 0 | Status: DISCONTINUED | OUTPATIENT
Start: 2020-11-10 | End: 2020-11-10

## 2020-11-10 RX ORDER — OXYCODONE AND ACETAMINOPHEN 5; 325 MG/1; MG/1
1 TABLET ORAL
Qty: 5 | Refills: 0
Start: 2020-11-10

## 2020-11-10 RX ORDER — ACETAMINOPHEN 500 MG
650 TABLET ORAL EVERY 6 HOURS
Refills: 0 | Status: DISCONTINUED | OUTPATIENT
Start: 2020-11-10 | End: 2020-11-10

## 2020-11-10 RX ORDER — CIPROFLOXACIN LACTATE 400MG/40ML
1 VIAL (ML) INTRAVENOUS
Qty: 20 | Refills: 0
Start: 2020-11-10 | End: 2020-11-19

## 2020-11-10 RX ADMIN — Medication 50 MILLIGRAM(S): at 05:27

## 2020-11-10 RX ADMIN — SODIUM CHLORIDE 100 MILLILITER(S): 9 INJECTION INTRAMUSCULAR; INTRAVENOUS; SUBCUTANEOUS at 00:19

## 2020-11-10 RX ADMIN — PANTOPRAZOLE SODIUM 40 MILLIGRAM(S): 20 TABLET, DELAYED RELEASE ORAL at 05:27

## 2020-11-10 RX ADMIN — SODIUM CHLORIDE 1000 MILLILITER(S): 9 INJECTION INTRAMUSCULAR; INTRAVENOUS; SUBCUTANEOUS at 02:12

## 2020-11-10 RX ADMIN — Medication 50 MILLIGRAM(S): at 13:58

## 2020-11-10 RX ADMIN — OXYCODONE AND ACETAMINOPHEN 1 TABLET(S): 5; 325 TABLET ORAL at 09:41

## 2020-11-10 RX ADMIN — OXYCODONE AND ACETAMINOPHEN 1 TABLET(S): 5; 325 TABLET ORAL at 08:41

## 2020-11-10 NOTE — DISCHARGE NOTE NURSING/CASE MANAGEMENT/SOCIAL WORK - PATIENT PORTAL LINK FT
You can access the FollowMyHealth Patient Portal offered by [facility] by registering at the following website: [email or web address]. By joining [employer]’s FollowMyHealth portal, you will also be able to view your health information using other applications (apps) compatible with our system.

## 2020-11-10 NOTE — PROGRESS NOTE ADULT - SUBJECTIVE AND OBJECTIVE BOX
Post Operative Note  Patient: WILFRID LAYNE 70y ([id number]-Zechariah-1950) Male   MRN: [id number]  Location: [address]  Visit: 11-09-20 Inpatient  Date: 11-10-20 @ 00:15    Procedure: S/p I&D of horseshoe rectal abscess with placement of seton    Subjective:   Patient seen and examined at bedside, reports mild postoperative pain, but otherwise feeling well.  Tolerating liquids, also ate a piece of a sandwich.  No nausea/vomiting.  No void yet.  Upon questioning regarding allergies, patient states he has had a severe anaphylactic reaction to PCN and sulfa drugs in the past.  Patient denies dizziness, lightheadedness, chest pain, sob, nausea or vomiting.    Objective:  Vitals: T(F): 99 (11-09-20 @ 23:34), Max: 102 (11-09-20 @ 22:40)  HR: 74 (11-09-20 @ 23:34)  BP: 105/61 (11-09-20 @ 22:40) (100/60 - 128/65)  RR: 18 (11-09-20 @ 23:34)  SpO2: 97% (11-09-20 @ 23:34)    In:   11-09-20 @ 07:01  -  11-10-20 @ 00:15  --------------------------------------------------------  IN: 775 mL    IV Fluids: sodium chloride 0.9% Bolus 500 milliLiter(s) IV Bolus once  sodium chloride 0.9%. 1000 milliLiter(s) (100 mL/Hr) IV Continuous <Continuous>    Out:   11-09-20 @ 07:01  -  11-10-20 @ 00:15  --------------------------------------------------------  OUT: 0 mL    EBL: 10mL    Voided Urine:   11-09-20 @ 07:01  -  11-10-20 @ 00:15  --------------------------------------------------------  OUT: 0 mL    Guzman Catheter: no     PHYSICAL EXAM  GENERAL:  Well-nourished, well-developed male lying comfortably in bed in NAD  HEENT:  Sclera white.  EXTREMITIES: Penny-rectal area dressed with gauze and tape, with mild serosanguinous drainage.  SKIN:  No jaundice, pallor, or cyanosis  NEURO:  A&O x 3    Medications: [Standing]  acetaminophen   Tablet .. 650 milliGRAM(s) Oral every 6 hours PRN  aztreonam  IVPB 1000 milliGRAM(s) IV Intermittent every 8 hours  BUpivacaine liposome 1.3% Injectable 20 milliLiter(s) Local Injection once  HYDROmorphone  Injectable 0.5 milliGRAM(s) IV Push every 4 hours PRN  HYDROmorphone  Injectable 1 milliGRAM(s) IV Push every 4 hours PRN  lisinopril 20 milliGRAM(s) Oral daily  ondansetron Injectable 4 milliGRAM(s) IV Push every 6 hours PRN  pantoprazole    Tablet 40 milliGRAM(s) Oral before breakfast  sodium chloride 0.9% Bolus 500 milliLiter(s) IV Bolus once  sodium chloride 0.9%. 1000 milliLiter(s) IV Continuous <Continuous>    Medications: [PRN]  acetaminophen   Tablet .. 650 milliGRAM(s) Oral every 6 hours PRN  aztreonam  IVPB 1000 milliGRAM(s) IV Intermittent every 8 hours  BUpivacaine liposome 1.3% Injectable 20 milliLiter(s) Local Injection once  HYDROmorphone  Injectable 0.5 milliGRAM(s) IV Push every 4 hours PRN  HYDROmorphone  Injectable 1 milliGRAM(s) IV Push every 4 hours PRN  lisinopril 20 milliGRAM(s) Oral daily  ondansetron Injectable 4 milliGRAM(s) IV Push every 6 hours PRN  pantoprazole    Tablet 40 milliGRAM(s) Oral before breakfast  sodium chloride 0.9% Bolus 500 milliLiter(s) IV Bolus once  sodium chloride 0.9%. 1000 milliLiter(s) IV Continuous <Continuous>    Labs:                        13.1   22.57 )-----------( 237      ( 09 Nov 2020 10:59 )             38.7     11-09    142  |  108  |  42<H>  ----------------------------<  110<H>  3.8   |  26  |  1.50<H>    Ca    8.8      09 Nov 2020 10:59    TPro  7.3  /  Alb  2.8<L>  /  TBili  1.1  /  DBili  x   /  AST  13<L>  /  ALT  16  /  AlkPhos  68  11-09    Imaging:  No post-op imaging studies    Assessment:  70 year old male s/p I&D of horseshoe rectal abscess with placement of seton.    Plan:  - Continue regular diet  - Pt states he has had a severe anaphylactic reaction in the past to PCN. Will continue aztreonam as he tolerated this in the ED  - Will give 500cc NS bolus for mild hypotension  - Monitor for void  - Pain control, antipyretics PRN  - Incentive spirometry  - Encourage ambulation  - DVT prophylaxis with SCDs  - Follow up AM labs  - Likely d/c in AM on PO antibiotics with follow up next week  - Will continue to monitor

## 2020-11-10 NOTE — PATIENT PROFILE ADULT - FALL HARM RISK TYPE OF ASSESSMENT
Alert and oriented to person, place and time, memory intact, behavior appropriate to situation, PERRL.
admission

## 2020-11-10 NOTE — DISCHARGE NOTE PROVIDER - HOSPITAL COURSE
70 year old male presented to [place] ED on 11/9/20 with 2 day history of kit-rectal pain and fever.  Patient found to have horseshoe kit-rectal abscess on exam.  Patient febrile to 102F. Labs drawn in ED showing WBC of 22 with 87% neutrophils.  Blood cultures were sent.  COVID-19 PCR was negative.  CT abd/pel performed showing bilateral perirectal abscesses containing fluid and gas measuring 5.0 x 1.4 cm on the left., and 3.9 x 1.4 cm on the right, as well as nonspecific focal thickening of the anterior bladder wall could be reactive/inflammatory, cannot exclude neoplastic etiology.  Patient was admitted, placed on IV aztreonam, and brought the OR for I&D of deep rectal abscess with seton placement and pudendal nerve block with Dr. Solis; patient tolerated procedure well, was brought to PACU and when PACU criteria met, transferred to the floor for continued antibiotics.  Diet was advanced as tolerated.  Patient was monitored for spontaneous void. 70 year old male presented to [place] ED on 11/9/20 with 2 day history of kit-rectal pain and fever.  Patient found to have horseshoe kit-rectal abscess on exam.  Patient febrile to 102F. Labs drawn in ED showing WBC of 22 with 87% neutrophils.  Blood cultures were sent.  COVID-19 PCR was negative.  CT abd/pel performed showing bilateral perirectal abscesses containing fluid and gas measuring 5.0 x 1.4 cm on the left., and 3.9 x 1.4 cm on the right, as well as nonspecific focal thickening of the anterior bladder wall could be reactive/inflammatory, cannot exclude neoplastic etiology.  Patient was admitted, placed on IV aztreonam, and brought the OR for I&D of deep rectal abscess with seton placement and pudendal nerve block with Dr. Solis; patient tolerated procedure well, was brought to PACU and when PACU criteria met, transferred to the floor for continued antibiotics.  Diet was advanced as tolerated.  Patient voided spontaneously. 70 year old male presented to [place] ED on 11/9/20 with 2 day history of kit-rectal pain and fever.  Patient found to have horseshoe kit-rectal abscess on exam.  Patient febrile to 102F. Labs drawn in ED showing WBC of 22 with 87% neutrophils.  Blood cultures were sent.  COVID-19 PCR was negative.  CT abd/pel performed showing bilateral perirectal abscesses containing fluid and gas measuring 5.0 x 1.4 cm on the left., and 3.9 x 1.4 cm on the right, as well as nonspecific focal thickening of the anterior bladder wall could be reactive/inflammatory, cannot exclude neoplastic etiology but felt likely due to recent Left Inguinal hernia repair which was incarcerated with sigmoid colon and bladder in hernia.  Patient was admitted, placed on IV aztreonam, and brought the OR for I&D of deep rectal abscess with seton placement and pudendal nerve block with Dr. Solis; patient tolerated procedure well, was brought to PACU and when PACU criteria met, transferred to the floor for continued antibiotics. Diet was advanced as tolerated.  Patient voided spontaneously. On POD#1, pt had adequate analgesia and continued to receive IV antibiotics. Vitals remained stable and patient was hemodynamically stable for discharge home with additional 10 day course of oral antibiotics with plan for follow up in 1 week in the office for removal of seton drains.

## 2020-11-10 NOTE — DISCHARGE NOTE PROVIDER - CARE PROVIDER_API CALL
Rachel Solis  COLON/RECTAL SURGERY  [address] [place] [address]  Phone: [phone]  Fax: [phone]  Follow Up Time: 1 week

## 2020-11-10 NOTE — DISCHARGE NOTE PROVIDER - NSDCCPTREATMENT_GEN_ALL_CORE_FT
PRINCIPAL PROCEDURE  Procedure: Incision and drainage of deep rectal abscess  Findings and Treatment:       SECONDARY PROCEDURE  Procedure: Placement, seton stitch, prone position  Findings and Treatment:     Procedure: Pudendal nerve block  Findings and Treatment:

## 2020-11-10 NOTE — PROGRESS NOTE ADULT - ATTENDING COMMENTS
Pt seen and examined.    Pain much improved.  Has voided. + flatus.  Tolerating regular diet    Perineal wound with seton intact.  moderate induration    WBC 19    imp: s/p I&D and seton placement of horseshoe perirectal abscess.    --home today  --will give 7 day course of cipro/flagyl.

## 2020-11-10 NOTE — PROGRESS NOTE ADULT - SUBJECTIVE AND OBJECTIVE BOX
STATUS POST: S/p I&D of horseshoe rectal abscess with placement of seton    POST OPERATIVE DAY #: 1    Subjective:   Patient seen and examined at bedside, reports mild postoperative pain, but otherwise feeling well.  Tolerating liquids, also ate a piece of a sandwich.  No nausea/vomiting.  No void yet.  Upon questioning regarding allergies, patient states he has had a severe anaphylactic reaction to PCN and sulfa drugs in the past.  Patient denies dizziness, lightheadedness, chest pain, sob, nausea or vomiting.    Vital Signs Last 24 Hrs  T(C): 36.3 (10 Nov 2020 04:34), Max: 38.9 (09 Nov 2020 22:40)  T(F): 97.4 (10 Nov 2020 04:34), Max: 102 (09 Nov 2020 22:40)  HR: 69 (10 Nov 2020 04:34) (69 - 106)  BP: 102/66 (10 Nov 2020 04:34) (100/60 - 128/65)  BP(mean): --  RR: 18 (10 Nov 2020 04:34) (16 - 22)  SpO2: 96% (10 Nov 2020 04:34) (96% - 100%)    PHYSICAL EXAM:  GENERAL: No acute distress, well-developed  HEAD:  Atraumatic, Normocephalic  RECTAL: currently with some mild purulent drainage around site with setonx2 in place  NEUROLOGY: A&O x 3, no focal deficits    I&O's Summary    09 Nov 2020 07:01  -  10 Nov 2020 07:00  --------------------------------------------------------  IN: 775 mL / OUT: 300 mL / NET: 475 mL      I&O's Detail    09 Nov 2020 07:01  -  10 Nov 2020 07:00  --------------------------------------------------------  IN:    IV PiggyBack: 100 mL    Lactated Ringers: 75 mL    Oral Fluid: 600 mL  Total IN: 775 mL    OUT:    Voided (mL): 300 mL  Total OUT: 300 mL    Total NET: 475 mL        MEDICATIONS  (STANDING):  aztreonam  IVPB 1000 milliGRAM(s) IV Intermittent every 8 hours  BUpivacaine liposome 1.3% Injectable 20 milliLiter(s) Local Injection once  influenza   Vaccine 0.5 milliLiter(s) IntraMuscular once  lisinopril 20 milliGRAM(s) Oral daily  pantoprazole    Tablet 40 milliGRAM(s) Oral before breakfast  sodium chloride 0.9%. 1000 milliLiter(s) (100 mL/Hr) IV Continuous <Continuous>    MEDICATIONS  (PRN):  acetaminophen   Tablet .. 650 milliGRAM(s) Oral every 6 hours PRN Temp greater or equal to 38C (100.4F), Mild Pain (1 - 3)  ondansetron Injectable 4 milliGRAM(s) IV Push every 6 hours PRN Nausea  oxycodone    5 mG/acetaminophen 325 mG 1 Tablet(s) Oral every 4 hours PRN Moderate Pain (4 - 6)  oxycodone    5 mG/acetaminophen 325 mG 2 Tablet(s) Oral every 6 hours PRN Severe Pain (7 - 10)    LABS:                        10.4   19.11 )-----------( 189      ( 10 Nov 2020 06:48 )             31.1     11-10    144  |  113<H>  |  32<H>  ----------------------------<  149<H>  3.5   |  25  |  1.10    Ca    7.7<L>      10 Nov 2020 06:48    TPro  7.3  /  Alb  2.8<L>  /  TBili  1.1  /  DBili  x   /  AST  13<L>  /  ALT  16  /  AlkPhos  68  11-09    RADIOLOGY & ADDITIONAL STUDIES:  no new    ASSESSMENT    70y Male s/p I&D of horseshoe rectal abscess with placement of seton currently awaiting d/c.    PLAN  - Will discuss with Dr. Irma florez today, likely for d/c this PM  - allergic to PCN so will d/w attending decision on abx  - pain control  - OOB  - REG diet    Surgical Team Contact Information  Spectralink: Ext: [phone] or [phone]  Pager: [phone]

## 2020-11-10 NOTE — DISCHARGE NOTE PROVIDER - NSDCCPCAREPLAN_GEN_ALL_CORE_FT
PRINCIPAL DISCHARGE DIAGNOSIS  Diagnosis: Perirectal abscess  Assessment and Plan of Treatment:

## 2020-11-10 NOTE — DISCHARGE NOTE PROVIDER - NSDCACTIVITY_GEN_ALL_CORE
No heavy lifting/straining/Walking - Indoors allowed/Walking - Outdoors allowed/Do not make important decisions/Stairs allowed/Do not drive or operate machinery

## 2020-11-10 NOTE — DISCHARGE NOTE PROVIDER - NSDCFUADDINST_GEN_ALL_CORE_FT
No heavy lifting over 15 lbs.  You may take over-the-counter pain medication as directed as needed for pain.  Continue to take your prescribed antibiotics as directed   You may sit in warm water for 20 minutes 3x/day(sitz bath - you may use Epsom salt in the bath as well).  Dry area and reapply dry dressing (gauze and tape).  If you have a fever greater than 101F, or are having difficulty urinating, please contact the doctor.   -No heavy lifting over 15 lbs.  -You may take over-the-counter pain medication as directed as needed for pain.  -Take percocet as needed for severe pain.  -Continue to take your prescribed antibiotics as directed   -You may sit in warm water for 20 minutes 3x/day(sitz bath - you may use Epsom salt in the bath as well).  Dry area and reapply dry dressing (gauze and tape).  -If you have a fever greater than 101F, or are having difficulty urinating, please contact the doctor.

## 2020-11-10 NOTE — DISCHARGE NOTE PROVIDER - NSDCFUADDAPPT_GEN_ALL_CORE_FT
Follow up with Dr. Solis in 1 week.  You should also follow up with an outpatient urologist for bladder findings on CT. Follow up with Dr. Solis in 1 week for removal of seton drains  You should also follow up with an outpatient urologist for bladder findings on CT.

## 2020-11-10 NOTE — DISCHARGE NOTE PROVIDER - NSDCMRMEDTOKEN_GEN_ALL_CORE_FT
amLODIPine 5 mg oral tablet: 1 tab(s) orally once a day  hydroCHLOROthiazide 12.5 mg oral capsule: 1 cap(s) orally once a day  levocetirizine 5 mg oral tablet: 1 tab(s) orally once a day (in the evening)  lisinopril 40 mg oral tablet: 1 tab(s) orally once a day  meloxicam 15 mg oral tablet: 1 tab(s) orally once a day  omeprazole 40 mg oral delayed release capsule: 1 cap(s) orally once a day   amLODIPine 5 mg oral tablet: 1 tab(s) orally once a day  Cipro 500 mg oral tablet: 1 tab(s) orally 2 times a day   Flagyl 500 mg oral tablet: 1 tab(s) orally every 8 hours   Flonase 50 mcg/inh nasal spray: 2 spray(s) in each nostril once a day  hydroCHLOROthiazide 12.5 mg oral capsule: 1 cap(s) orally once a day  levocetirizine 5 mg oral tablet: 1 tab(s) orally once a day (in the evening)  lisinopril 40 mg oral tablet: 1 tab(s) orally once a day  meloxicam 15 mg oral tablet: 1 tab(s) orally once a day  omeprazole 40 mg oral delayed release capsule: 1 cap(s) orally once a day  oxycodone-acetaminophen 5 mg-325 mg oral tablet: 1 tab(s) orally every 4-6 hours  as needed for moderate to severe pain MDD:4

## 2020-11-10 NOTE — DISCHARGE NOTE PROVIDER - CARE PROVIDERS DIRECT ADDRESSES
[email or web address]

## 2020-11-11 ENCOUNTER — NON-APPOINTMENT (OUTPATIENT)
Age: 70
End: 2020-11-11

## 2020-11-14 LAB
CULTURE RESULTS: SIGNIFICANT CHANGE UP
CULTURE RESULTS: SIGNIFICANT CHANGE UP
SPECIMEN SOURCE: SIGNIFICANT CHANGE UP
SPECIMEN SOURCE: SIGNIFICANT CHANGE UP

## 2020-11-16 ENCOUNTER — APPOINTMENT (OUTPATIENT)
Dept: COLORECTAL SURGERY | Facility: CLINIC | Age: 70
End: 2020-11-16
Payer: MEDICARE

## 2020-11-16 VITALS
HEART RATE: 118 BPM | BODY MASS INDEX: 29.44 KG/M2 | WEIGHT: 160 LBS | DIASTOLIC BLOOD PRESSURE: 65 MMHG | SYSTOLIC BLOOD PRESSURE: 103 MMHG | HEIGHT: 62 IN | TEMPERATURE: 97 F

## 2020-11-16 PROCEDURE — 99024 POSTOP FOLLOW-UP VISIT: CPT

## 2020-11-16 PROCEDURE — 17250 CHEM CAUT OF GRANLTJ TISSUE: CPT | Mod: 58

## 2020-11-16 NOTE — PHYSICAL EXAM
[No Rash or Lesion] : No rash or lesion [Alert] : alert [Oriented to Person] : oriented to person [Oriented to Place] : oriented to place [Oriented to Time] : oriented to time [Calm] : calm [Skin Tags] : there were no residual hemorrhoidal skin tags seen [Gross Blood] : no gross blood [de-identified] : Bilateral ischio anal wound openings with bilateral setons in place.  No obvious evidence of purulence. [de-identified] : [place] with expected post procedure induration [de-identified] : No apparent distress [de-identified] : Normocephalic atraumatic [de-identified] : Moving all extremities x4

## 2020-11-16 NOTE — HISTORY OF PRESENT ILLNESS
[FreeTextEntry1] : 11/9/20 Drainage of anterior horseshoe abscess with B/L seton placement\par Mr. De Paz presents to the office for a postoperative visit.  Overall, he is doing fairly well.  He notes that there is ongoing perirectal and wound drainage, however, it is gradually decreasing in amount.  Bowel movements are passed regularly and without significant bleeding but with expected anorectal pain and discomfort.

## 2020-11-16 NOTE — REASON FOR VISIT
[Post Op: _________] : a [unfilled] post op visit [FreeTextEntry1] : 11/9/20 Drainage of anterior horseshoe abscess with B/L seton placement

## 2020-11-16 NOTE — ASSESSMENT
[FreeTextEntry1] : Mr. De Paz presents to the office for a postoperative visit.  Overall, he is progressing as expected with the anticipated post procedure drainage and anorectal discomfort.  In office today, we proceeded to debride his wounds with silver nitrate and the right seton was removed.  He is to return to the office in 1 week's time for us to further debride the external wounds and likely remove the left seton.  He understands, and is agreeable.

## 2020-11-17 NOTE — CHART NOTE - NSCHARTNOTEFT_GEN_A_CORE
Patient was admitted with sepsis secondary to anterior horseshoe abscess as evidenced by fevers, tachycardia and leukocytosis.

## 2020-11-25 ENCOUNTER — APPOINTMENT (OUTPATIENT)
Dept: COLORECTAL SURGERY | Facility: CLINIC | Age: 70
End: 2020-11-25
Payer: MEDICARE

## 2020-11-25 VITALS
HEIGHT: 62 IN | WEIGHT: 160 LBS | DIASTOLIC BLOOD PRESSURE: 77 MMHG | SYSTOLIC BLOOD PRESSURE: 119 MMHG | BODY MASS INDEX: 29.44 KG/M2 | TEMPERATURE: 97.2 F | HEART RATE: 102 BPM

## 2020-11-25 DIAGNOSIS — Z09 ENCOUNTER FOR FOLLOW-UP EXAMINATION AFTER COMPLETED TREATMENT FOR CONDITIONS OTHER THAN MALIGNANT NEOPLASM: ICD-10-CM

## 2020-11-25 PROCEDURE — 17250 CHEM CAUT OF GRANLTJ TISSUE: CPT | Mod: 78

## 2020-11-25 PROCEDURE — 99024 POSTOP FOLLOW-UP VISIT: CPT

## 2020-11-25 NOTE — HISTORY OF PRESENT ILLNESS
[FreeTextEntry1] : 11/9/20 Drainage of anterior horseshoe abscess with B/L seton placement\par Mr. De Paz presents to the office for a postoperative visit.  Overall, he is doing fairly well.  He notes that there is ongoing perirectal and wound drainage, however, it is gradually decreasing in amount.  Bowel movements are passed regularly and without significant bleeding but with expected anorectal pain and discomfort.\par \par 11/25/20 Returns to office for followup. Reports that he is doing well. Feels improved. Daughter states that he is weak and not eating much. Dad states the weakness is from arthritis, not from feeling unwell secondary to rectal surgery. BMs passed without issue. No significant pain. Drainage continues but improving. No F/C.

## 2020-11-25 NOTE — ASSESSMENT
[FreeTextEntry1] : Mr. De Paz presents to the office for a postoperative visit.  Overall, he is progressing as expected with the anticipated post procedure drainage and anorectal discomfort.  In office today, we proceeded to debride his wounds with silver nitrate and the right seton was removed.  He is to return to the office in 1 week's time for us to further debride the external wounds and likely remove the left seton.  He understands, and is agreeable.\par \par 11/25/20 Doing well. Operative report, intraop cultures provided to patient for records. Discussed pt progress with daughter over telephone. Encouraged increased po intake. Discussed blood draw at this visit but patient declined.\par Advised initiation of hormonal treatment of prostate cancer in mid December when he is more fully healed.

## 2020-11-25 NOTE — PROCEDURE
[FreeTextEntry1] : Silver nitrate to external wounds and remaining seton in left ischioanal fossa removed

## 2020-11-25 NOTE — PHYSICAL EXAM
[Skin Tags] : there were no residual hemorrhoidal skin tags seen [Gross Blood] : no gross blood [No Rash or Lesion] : No rash or lesion [Alert] : alert [Oriented to Person] : oriented to person [Oriented to Place] : oriented to place [Oriented to Time] : oriented to time [Calm] : calm [de-identified] : Bilateral ischio anal wound openings with bilateral setons in place. Right sided external opening is granulating well, no e/o underlying tract,  No obvious evidence of purulence. [de-identified] : [place] with expected post procedure induration [de-identified] : No apparent distress [de-identified] : Normocephalic atraumatic [de-identified] : Moving all extremities x4

## 2020-12-31 ENCOUNTER — APPOINTMENT (OUTPATIENT)
Dept: INTERNAL MEDICINE | Facility: CLINIC | Age: 70
End: 2020-12-31
Payer: MEDICARE

## 2020-12-31 VITALS
HEART RATE: 70 BPM | RESPIRATION RATE: 14 BRPM | SYSTOLIC BLOOD PRESSURE: 142 MMHG | OXYGEN SATURATION: 98 % | DIASTOLIC BLOOD PRESSURE: 86 MMHG | TEMPERATURE: 97.8 F | HEIGHT: 62 IN

## 2020-12-31 DIAGNOSIS — L73.9 FOLLICULAR DISORDER, UNSPECIFIED: ICD-10-CM

## 2020-12-31 PROCEDURE — 99214 OFFICE O/P EST MOD 30 MIN: CPT

## 2020-12-31 NOTE — PHYSICAL EXAM
[Normal] : affect was normal and insight and judgment were intact [de-identified] : trace to 1+ edema LE's. right leg with multiple erythematous spots and one ecchymotic area on anterior lower leg. no calf edema or tenderness.

## 2020-12-31 NOTE — HISTORY OF PRESENT ILLNESS
[FreeTextEntry8] : cc: rash\par \par c/o rash on right lower leg for 4 days. He has tried cortisone cream with no relief. Not really painful or itching.

## 2021-01-06 ENCOUNTER — APPOINTMENT (OUTPATIENT)
Dept: INTERNAL MEDICINE | Facility: CLINIC | Age: 71
End: 2021-01-06
Payer: MEDICARE

## 2021-01-06 VITALS
TEMPERATURE: 96.2 F | HEIGHT: 62 IN | DIASTOLIC BLOOD PRESSURE: 80 MMHG | BODY MASS INDEX: 28.34 KG/M2 | SYSTOLIC BLOOD PRESSURE: 136 MMHG | WEIGHT: 154 LBS

## 2021-01-06 DIAGNOSIS — R21 RASH AND OTHER NONSPECIFIC SKIN ERUPTION: ICD-10-CM

## 2021-01-06 PROCEDURE — 99213 OFFICE O/P EST LOW 20 MIN: CPT

## 2021-01-06 NOTE — PHYSICAL EXAM
[Normal] : no acute distress, well nourished, well developed and well-appearing [de-identified] : dry erythematous skin on upper back, right anterior leg with erythema

## 2021-01-06 NOTE — HISTORY OF PRESENT ILLNESS
[FreeTextEntry1] : rash [de-identified] : Still has rash and now looks worse. It does itch. Has it on right leg and upper back.

## 2021-01-11 ENCOUNTER — APPOINTMENT (OUTPATIENT)
Dept: INTERNAL MEDICINE | Facility: CLINIC | Age: 71
End: 2021-01-11
Payer: MEDICARE

## 2021-01-11 VITALS
HEART RATE: 76 BPM | OXYGEN SATURATION: 98 % | WEIGHT: 154 LBS | RESPIRATION RATE: 14 BRPM | DIASTOLIC BLOOD PRESSURE: 86 MMHG | TEMPERATURE: 98.2 F | HEIGHT: 62 IN | SYSTOLIC BLOOD PRESSURE: 118 MMHG | BODY MASS INDEX: 28.34 KG/M2

## 2021-01-11 DIAGNOSIS — R49.0 DYSPHONIA: ICD-10-CM

## 2021-01-11 DIAGNOSIS — L30.9 DERMATITIS, UNSPECIFIED: ICD-10-CM

## 2021-01-11 PROCEDURE — 99214 OFFICE O/P EST MOD 30 MIN: CPT

## 2021-01-11 RX ORDER — DOXYCYCLINE HYCLATE 100 MG/1
100 CAPSULE ORAL TWICE DAILY
Qty: 14 | Refills: 0 | Status: DISCONTINUED | COMMUNITY
Start: 2020-12-31 | End: 2021-01-11

## 2021-01-11 NOTE — PHYSICAL EXAM
[Normal] : affect was normal and insight and judgment were intact [de-identified] : rash is much improved

## 2021-01-21 ENCOUNTER — APPOINTMENT (OUTPATIENT)
Dept: INTERNAL MEDICINE | Facility: CLINIC | Age: 71
End: 2021-01-21
Payer: MEDICARE

## 2021-01-21 VITALS
DIASTOLIC BLOOD PRESSURE: 82 MMHG | WEIGHT: 150 LBS | RESPIRATION RATE: 14 BRPM | SYSTOLIC BLOOD PRESSURE: 138 MMHG | HEIGHT: 62 IN | OXYGEN SATURATION: 95 % | BODY MASS INDEX: 27.6 KG/M2 | HEART RATE: 61 BPM | TEMPERATURE: 96.9 F

## 2021-01-21 PROCEDURE — 99214 OFFICE O/P EST MOD 30 MIN: CPT

## 2021-01-21 NOTE — PHYSICAL EXAM
[Normal Oropharynx] : the oropharynx was normal [Normal TMs] : both tympanic membranes were normal [Normal] : affect was normal and insight and judgment were intact [de-identified] : sinus tenderness

## 2021-01-21 NOTE — HISTORY OF PRESENT ILLNESS
[FreeTextEntry8] : cc: sinus congestion\par \par c/o sinus and head congestion. Has cold symptoms. Coughing a little. Has been sick for 4 days. No fever, chills.

## 2021-01-21 NOTE — ASSESSMENT
[FreeTextEntry1] : sinusitis\par rx for z-anamaria\par benzonatate\par \par h/o prostate ca\par stable\par \par

## 2021-02-22 NOTE — H&P PST ADULT - BLOOD TRANSFUSION, PREVIOUS, PROFILE
HBO Treatment Course Details       Treatment Notes: Pt  Tolerated HBOT today without any adverse events  Treatment Course Number: 5  Total Treatments Ordered:  30     Diagnosis:   1  Osteoradionecrosis of mandible  [facility]       H[facility] Treatment Details:  Treatment Length:90 Minutes(Minutes)  Chamber #: Hard sided Monoplace Chamber    Pre-Treatment details:  Pre-treatment protocol Treatment Protocol: 2 5 DURAN X 90 minutes w/ 100% oxygen, two 5 minute air breaks  Left ear clear?: yes  Right ear clear?: yes  Left ear intact?: yes  Right ear intact?: yes        PE Tubes present, Left ear?: no  PE Tubes present, Right ear?: no  Left ear irrigated?: no  Right ear irrigated?: no  Left ear TEED scale: Grade 0  Right ear TEED scale: Grade 0   Pretreatment heart and lung assessment: Pretreatment heart and lung auscltation unremarkable  Patient cleared for HBOT     Treatment details:  DURAN Rate: 2 5  Started Compression: 1115  Reached Compression: 1130  Total Compression Time: 15 (Minutes)  Total Holding Time: 100 (Minutes)  Started Decompression: 1310  Reached Surface: 1320  Total Decompression Time: 10 (Minutes)  Total Airbreaks: 10 (Minutes)  Total Time of Treatment: 115 (Minutes)  Symptoms Noted During Treatment: None (Minutes)    Post treatment details:  Left ear clear?: yes  Right ear clear?: yes  Left ears intact?: yes  Right ears intact?: yes        PE Tubes present, Left ear?: no  PE Tubes present, Right ear?: no        Left ear TEED scale: Grade 0  Right ear TEED scale: Grade 0  Post treatment heart and lung assessment: Post treatment heart and lung auscltation unremarkable  Patient cleared for discharge  Tolerated treatment well         Vital Signs:  HBO Glucose Reference Range: 100-350 mg/dl   Pre-Treatment Post-Treatment   Time vitals are taken: 1100 Time vitals are taken: 1325   Blood Pressure: 115/60 Blood Pressure: 120/78   Pulse: 100 Pulse: 78   Resp: 16 Resp: 20   Temp: 96 6 °F (35 9 °C) Temp: 96 4 °F (35 8 °C)             Allergies   Allergen Reactions    Diacetylmorphine      paranoid    Morphine Other (See Comments)     "confusion & paranoid behavior"    Pineapple Tongue Swelling     Tongue bleeding     Patient Active Problem List    Diagnosis Date Noted    Major depression, recurrent ([facility] ) 12/04/2018    Anal lesion 03/26/2018    Tonsil cancer ([facility] ) 03/15/2018    Hypothyroidism, adult 01/12/2018    Vitamin D deficiency 01/12/2018    Mild cognitive impairment 12/08/2017    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

## 2021-06-03 ENCOUNTER — APPOINTMENT (OUTPATIENT)
Dept: COLORECTAL SURGERY | Facility: CLINIC | Age: 71
End: 2021-06-03
Payer: MEDICARE

## 2021-06-03 VITALS
DIASTOLIC BLOOD PRESSURE: 94 MMHG | HEART RATE: 99 BPM | WEIGHT: 150 LBS | OXYGEN SATURATION: 99 % | BODY MASS INDEX: 27.6 KG/M2 | TEMPERATURE: 97.5 F | RESPIRATION RATE: 15 BRPM | SYSTOLIC BLOOD PRESSURE: 167 MMHG | HEIGHT: 62 IN

## 2021-06-03 DIAGNOSIS — K60.3 ANAL FISTULA: ICD-10-CM

## 2021-06-03 PROCEDURE — 46600 DIAGNOSTIC ANOSCOPY SPX: CPT

## 2021-06-03 NOTE — PROCEDURE
[FreeTextEntry1] : Anoscopy recommended as part of further workup.  Risks include bleeding and discomfort discussed.  Questions answered.  Pt provides verbal consent.\par \par Well lubricated anoscope inserted into anus.  All 4 quadrants examined.\par Anoscopy shows small internal hemorrhoids, not inflammed, no fissure.  Anteirly there is an opening ~ 5mm diameter in mid anal canal with drainage of some pus and blood  can insert q-tip into the internal opening, goes into cavity ~ 1cm.  nontender to exam.\par \par Pt tolerated procedure.\par

## 2021-06-03 NOTE — HISTORY OF PRESENT ILLNESS
[FreeTextEntry1] : Patient here for follow-up regarding anal fistula.\par \par 71-year-old gentleman status post drainage of anterior horseshoe abscess with Dr. Solis in November 2020.  Initially with bilateral setons, which was subsequently removed.  Last note on allscripts was from late november 2020\par \par Over the past week reports increasing pain on right side.  There was small amount of blood, but denies other drainage.  no fevers or trouble urinating.  denies fecal incontinence,

## 2021-06-03 NOTE — PHYSICAL EXAM
[Respiratory Effort] : normal respiratory effort [Normal Rate and Rhythm] : normal rate and rhythm [de-identified] : ND/soft/NT [de-identified] : no perianal erythema or induration.  moderate amount of drainage per rectum.  [place] shows minimal discomfort.  there is a lot of scarring anterirly with anterior sinus.   [de-identified] : NAD

## 2021-06-03 NOTE — ASSESSMENT
[FreeTextEntry1] : anorectal abscess/anal fistula\par --s/p I&D of anterior horseshoe abscess in November.  Pt still has residual internal opening in distal anal canal.  Pt may need exam under anesthesia for further evaluation with possible fistulotomy vs other fistula procedures.  At present, there is a chronic abscess cavity that appears to be adequately drained.\par --Recommend sitz baths 3x daily for now.  If pain worsens, then call office for urgent re-evaluation. \par --Will have patient see Dr. Solis again for re-evaluation

## 2021-06-10 ENCOUNTER — RX RENEWAL (OUTPATIENT)
Age: 71
End: 2021-06-10

## 2021-06-21 ENCOUNTER — APPOINTMENT (OUTPATIENT)
Dept: COLORECTAL SURGERY | Facility: CLINIC | Age: 71
End: 2021-06-21
Payer: MEDICARE

## 2021-06-21 VITALS
TEMPERATURE: 98 F | BODY MASS INDEX: 27.6 KG/M2 | SYSTOLIC BLOOD PRESSURE: 147 MMHG | HEIGHT: 62 IN | WEIGHT: 150 LBS | DIASTOLIC BLOOD PRESSURE: 95 MMHG | RESPIRATION RATE: 15 BRPM | HEART RATE: 90 BPM

## 2021-06-21 DIAGNOSIS — Z87.19 PERSONAL HISTORY OF OTHER DISEASES OF THE DIGESTIVE SYSTEM: ICD-10-CM

## 2021-06-21 PROCEDURE — 99024 POSTOP FOLLOW-UP VISIT: CPT

## 2021-06-21 PROCEDURE — 17250 CHEM CAUT OF GRANLTJ TISSUE: CPT | Mod: 58

## 2021-06-21 NOTE — ASSESSMENT
[FreeTextEntry1] : Mr. De Paz presents to the office for a postoperative visit.  Overall, he is progressing as expected with the anticipated post procedure drainage and anorectal discomfort.  In office today, we proceeded to debride his wounds with silver nitrate and the right seton was removed.  He is to return to the office in 1 week's time for us to further debride the external wounds and likely remove the left seton.  He understands, and is agreeable.\par \par 11/25/20 Doing well. Operative report, intraop cultures provided to patient for records. Discussed pt progress with daughter over telephone. Encouraged increased po intake. Discussed blood draw at this visit but patient declined.\par Advised initiation of hormonal treatment of prostate cancer in mid December when he is more fully healed.\par \par 6/21/21 Mr. De Paz presents to the office for follow-up.  Since his last office visit 2 weeks earlier, he reports resolution of right-sided discomfort.  On physical exam, there is the expected induration of his anterior rectum status post internal fistulotomy.  The right anterior external wound has an exposed wound base of approximately 1 cm but on probing, does not tunnel.  It is fairly shallow and was cauterized with silver nitrate.  There is no obvious evidence of infection or of an anal rectal fistula.  Based on his clinical asymptomatic state and the unimpressive physical exam, I have recommended no additional surgery including EUA.  His daughter was updated by telephone and voicemail at [phone].

## 2021-06-21 NOTE — HISTORY OF PRESENT ILLNESS
[FreeTextEntry1] : 11/9/20 Drainage of anterior horseshoe abscess with B/L seton placement\par Mr. De Paz presents to the office for a postoperative visit.  Overall, he is doing fairly well.  He notes that there is ongoing perirectal and wound drainage, however, it is gradually decreasing in amount.  Bowel movements are passed regularly and without significant bleeding but with expected anorectal pain and discomfort.\par \par 11/25/20 Returns to office for followup. Reports that he is doing well. Feels improved. Daughter states that he is weak and not eating much. Dad states the weakness is from arthritis, not from feeling unwell secondary to rectal surgery. BMs passed without issue. No significant pain. Drainage continues but improving. No F/C.\par \par 6/21/21 Returns to office for followup. In interim, approx 2 weeks earlier, was seen in office by another physician secondary to concerns of increasing pain on the right side of his buttock.  There was concern for a fistula and that exam under anesthesia would be necessary.  Since that earlier visit, he reports resolution of pain with no drainage from the external fistula site.  There is no bleeding, no fevers or chills and no pain with defecation.  Bowel movements are passed normally without issues.  He has completed treatment for prostate cancer.

## 2021-06-21 NOTE — PHYSICAL EXAM
[Normal rectal exam] : exam was normal [Reduce Spontaneously] : a spontaneously reducible (grade II) [Skin Tags] : there were no residual hemorrhoidal skin tags seen [Normal] : was normal [None] : there was no rectal mass  [Gross Blood] : no gross blood [No Rash or Lesion] : No rash or lesion [Alert] : alert [Oriented to Person] : oriented to person [Oriented to Place] : oriented to place [Oriented to Time] : oriented to time [Calm] : calm [de-identified] : right anterior external skin opening 1cm in length and shallow, no e/o infection, no drainage, no tunneling [de-identified] : [place] with expected post procedure induration [de-identified] : No apparent distress [de-identified] : Normocephalic atraumatic [de-identified] : Moving all extremities x4

## 2021-06-24 ENCOUNTER — LABORATORY RESULT (OUTPATIENT)
Age: 71
End: 2021-06-24

## 2021-06-25 LAB
25(OH)D3 SERPL-MCNC: 70.2 NG/ML
ALBUMIN SERPL ELPH-MCNC: 4 G/DL
ALP BLD-CCNC: 47 U/L
ALT SERPL-CCNC: 13 U/L
ANION GAP SERPL CALC-SCNC: 14 MMOL/L
AST SERPL-CCNC: 16 U/L
BASOPHILS # BLD AUTO: 0.08 K/UL
BASOPHILS NFR BLD AUTO: 0.8 %
BILIRUB SERPL-MCNC: 0.2 MG/DL
BUN SERPL-MCNC: 41 MG/DL
CALCIUM SERPL-MCNC: 9.1 MG/DL
CHLORIDE SERPL-SCNC: 107 MMOL/L
CHOLEST SERPL-MCNC: 249 MG/DL
CO2 SERPL-SCNC: 21 MMOL/L
CREAT SERPL-MCNC: 1.22 MG/DL
EOSINOPHIL # BLD AUTO: 0.4 K/UL
EOSINOPHIL NFR BLD AUTO: 4.1 %
ESTIMATED AVERAGE GLUCOSE: 131 MG/DL
FERRITIN SERPL-MCNC: 58 NG/ML
FOLATE SERPL-MCNC: 17.6 NG/ML
GLUCOSE SERPL-MCNC: 94 MG/DL
HBA1C MFR BLD HPLC: 6.2 %
HCT VFR BLD CALC: 40.4 %
HDLC SERPL-MCNC: 67 MG/DL
HGB BLD-MCNC: 12.9 G/DL
IMM GRANULOCYTES NFR BLD AUTO: 1.8 %
IRON SATN MFR SERPL: 13 %
IRON SERPL-MCNC: 48 UG/DL
LDLC SERPL CALC-MCNC: 159 MG/DL
LYMPHOCYTES # BLD AUTO: 3.02 K/UL
LYMPHOCYTES NFR BLD AUTO: 30.9 %
MAN DIFF?: NORMAL
MCHC RBC-ENTMCNC: 31.3 PG
MCHC RBC-ENTMCNC: 31.9 GM/DL
MCV RBC AUTO: 98.1 FL
MONOCYTES # BLD AUTO: 0.86 K/UL
MONOCYTES NFR BLD AUTO: 8.8 %
NEUTROPHILS # BLD AUTO: 5.22 K/UL
NEUTROPHILS NFR BLD AUTO: 53.6 %
NONHDLC SERPL-MCNC: 182 MG/DL
PLATELET # BLD AUTO: 233 K/UL
POTASSIUM SERPL-SCNC: 4 MMOL/L
PROT SERPL-MCNC: 6.2 G/DL
PSA SERPL-MCNC: 5.06 NG/ML
RBC # BLD: 4.12 M/UL
RBC # FLD: 13.6 %
SODIUM SERPL-SCNC: 142 MMOL/L
TIBC SERPL-MCNC: 357 UG/DL
TRIGL SERPL-MCNC: 111 MG/DL
TSH SERPL-ACNC: 8.06 UIU/ML
UIBC SERPL-MCNC: 309 UG/DL
URATE SERPL-MCNC: 5.4 MG/DL
VIT B12 SERPL-MCNC: 542 PG/ML
WBC # FLD AUTO: 9.76 K/UL

## 2021-06-26 DIAGNOSIS — M81.0 AGE-RELATED OSTEOPOROSIS W/OUT CURRENT PATHOLOGICAL FRACTURE: ICD-10-CM

## 2021-06-30 LAB
TESTOST BND SERPL-MCNC: 3 PG/ML
TESTOSTERONE TOTAL S: 181 NG/DL

## 2021-07-06 ENCOUNTER — RX RENEWAL (OUTPATIENT)
Age: 71
End: 2021-07-06

## 2021-07-07 ENCOUNTER — APPOINTMENT (OUTPATIENT)
Dept: INTERNAL MEDICINE | Facility: CLINIC | Age: 71
End: 2021-07-07
Payer: MEDICARE

## 2021-07-07 VITALS
SYSTOLIC BLOOD PRESSURE: 132 MMHG | RESPIRATION RATE: 14 BRPM | WEIGHT: 150 LBS | DIASTOLIC BLOOD PRESSURE: 84 MMHG | TEMPERATURE: 97.8 F | HEIGHT: 62 IN | BODY MASS INDEX: 27.6 KG/M2 | OXYGEN SATURATION: 97 % | HEART RATE: 86 BPM

## 2021-07-07 PROCEDURE — G0439: CPT

## 2021-07-07 RX ORDER — ENEMA 19; 7 G/133ML; G/133ML
7-19 ENEMA RECTAL
Qty: 1 | Refills: 0 | Status: DISCONTINUED | COMMUNITY
Start: 2020-07-06 | End: 2021-07-07

## 2021-07-07 RX ORDER — HYDROCORTISONE ACETATE 25 MG/1
25 SUPPOSITORY RECTAL
Qty: 30 | Refills: 2 | Status: DISCONTINUED | COMMUNITY
Start: 2019-12-30 | End: 2021-07-07

## 2021-07-07 RX ORDER — DIAZEPAM 5 MG/1
5 TABLET ORAL
Qty: 1 | Refills: 0 | Status: DISCONTINUED | COMMUNITY
Start: 2020-07-17 | End: 2021-07-07

## 2021-07-07 NOTE — HISTORY OF PRESENT ILLNESS
[de-identified] : c/o pain in lower back and outer hips. Requests P.T. He takes tylenol prn. \par \par h/o prostate ca and treated by urology. \par \par Osteoporosis and has not been to endo yet.

## 2021-07-07 NOTE — PHYSICAL EXAM
[Normal] : affect was normal and insight and judgment were intact [de-identified] : no pain with hip rom, tenderness outer hips, no straight leg raising pain. kyphosis.

## 2021-07-07 NOTE — PHYSICAL EXAM
[Normal] : affect was normal and insight and judgment were intact [de-identified] : no pain with hip rom, tenderness outer hips, no straight leg raising pain. kyphosis.

## 2021-07-07 NOTE — HISTORY OF PRESENT ILLNESS
[de-identified] : c/o pain in lower back and outer hips. Requests P.T. He takes tylenol prn. \par \par h/o prostate ca and treated by urology. \par \par Osteoporosis and has not been to endo yet.

## 2021-08-10 ENCOUNTER — RX RENEWAL (OUTPATIENT)
Age: 71
End: 2021-08-10

## 2021-08-23 NOTE — ED PROVIDER NOTE - ADMIT DISPOSITION PRESENT ON ADMISSION SEPSIS
No Calcipotriene Counseling:  I discussed with the patient the risks of calcipotriene including but not limited to erythema, scaling, itching, and irritation.

## 2021-10-21 ENCOUNTER — RX RENEWAL (OUTPATIENT)
Age: 71
End: 2021-10-21

## 2021-10-21 ENCOUNTER — APPOINTMENT (OUTPATIENT)
Dept: INTERNAL MEDICINE | Facility: CLINIC | Age: 71
End: 2021-10-21
Payer: MEDICARE

## 2021-10-21 VITALS
RESPIRATION RATE: 14 BRPM | WEIGHT: 160 LBS | SYSTOLIC BLOOD PRESSURE: 120 MMHG | OXYGEN SATURATION: 98 % | DIASTOLIC BLOOD PRESSURE: 70 MMHG | BODY MASS INDEX: 29.44 KG/M2 | HEART RATE: 88 BPM | TEMPERATURE: 98 F | HEIGHT: 62 IN

## 2021-10-21 DIAGNOSIS — R05.9 COUGH, UNSPECIFIED: ICD-10-CM

## 2021-10-21 PROCEDURE — 99214 OFFICE O/P EST MOD 30 MIN: CPT

## 2021-10-21 NOTE — HISTORY OF PRESENT ILLNESS
[FreeTextEntry1] : BP check [de-identified] : c/o cough, congestion, runny nose for a couple days. \par \par Has prostate cancer and has been going to a urologist.

## 2021-12-29 NOTE — ED ADULT TRIAGE NOTE - NS ED TRIAGE AVPU SCALE
Alert-The patient is alert, awake and responds to voice. The patient is oriented to time, place, and person. The triage nurse is able to obtain subjective information. Erythromycin Pregnancy And Lactation Text: This medication is Pregnancy Category B and is considered safe during pregnancy. It is also excreted in breast milk.

## 2022-02-16 ENCOUNTER — APPOINTMENT (OUTPATIENT)
Dept: INTERNAL MEDICINE | Facility: CLINIC | Age: 72
End: 2022-02-16
Payer: MEDICARE

## 2022-02-16 ENCOUNTER — LABORATORY RESULT (OUTPATIENT)
Age: 72
End: 2022-02-16

## 2022-02-16 VITALS
BODY MASS INDEX: 29.44 KG/M2 | DIASTOLIC BLOOD PRESSURE: 82 MMHG | RESPIRATION RATE: 14 BRPM | WEIGHT: 160 LBS | OXYGEN SATURATION: 96 % | HEIGHT: 62 IN | SYSTOLIC BLOOD PRESSURE: 120 MMHG | HEART RATE: 83 BPM | TEMPERATURE: 97.6 F

## 2022-02-16 PROCEDURE — 99214 OFFICE O/P EST MOD 30 MIN: CPT

## 2022-02-16 NOTE — PHYSICAL EXAM
[Normal TMs] : both tympanic membranes were normal [Normal] : affect was normal and insight and judgment were intact [de-identified] : sinus tenderness

## 2022-02-16 NOTE — HISTORY OF PRESENT ILLNESS
[FreeTextEntry1] : bw and sinus congestion [de-identified] : Pt c/o congestion and pressure in his ears and head for 2 weeks. Has light mucus.

## 2022-02-17 LAB
ALBUMIN SERPL ELPH-MCNC: 4.3 G/DL
ALP BLD-CCNC: 43 U/L
ALT SERPL-CCNC: 16 U/L
ANION GAP SERPL CALC-SCNC: 14 MMOL/L
AST SERPL-CCNC: 17 U/L
BASOPHILS # BLD AUTO: 0.06 K/UL
BASOPHILS NFR BLD AUTO: 0.6 %
BILIRUB SERPL-MCNC: 0.4 MG/DL
BUN SERPL-MCNC: 41 MG/DL
CALCIUM SERPL-MCNC: 8.9 MG/DL
CHLORIDE SERPL-SCNC: 110 MMOL/L
CHOLEST SERPL-MCNC: 211 MG/DL
CO2 SERPL-SCNC: 23 MMOL/L
CREAT SERPL-MCNC: 1.51 MG/DL
EOSINOPHIL # BLD AUTO: 0.22 K/UL
EOSINOPHIL NFR BLD AUTO: 2.3 %
ESTIMATED AVERAGE GLUCOSE: 137 MG/DL
FERRITIN SERPL-MCNC: 43 NG/ML
FOLATE SERPL-MCNC: 12.2 NG/ML
GLUCOSE SERPL-MCNC: 96 MG/DL
HBA1C MFR BLD HPLC: 6.4 %
HCT VFR BLD CALC: 43.3 %
HDLC SERPL-MCNC: 66 MG/DL
HGB BLD-MCNC: 13.3 G/DL
IMM GRANULOCYTES NFR BLD AUTO: 0.4 %
IRON SATN MFR SERPL: 29 %
IRON SERPL-MCNC: 105 UG/DL
LDLC SERPL CALC-MCNC: 105 MG/DL
LYMPHOCYTES # BLD AUTO: 2.81 K/UL
LYMPHOCYTES NFR BLD AUTO: 29.3 %
MAN DIFF?: NORMAL
MCHC RBC-ENTMCNC: 29.4 PG
MCHC RBC-ENTMCNC: 30.7 GM/DL
MCV RBC AUTO: 95.8 FL
MONOCYTES # BLD AUTO: 0.57 K/UL
MONOCYTES NFR BLD AUTO: 5.9 %
NEUTROPHILS # BLD AUTO: 5.9 K/UL
NEUTROPHILS NFR BLD AUTO: 61.5 %
NONHDLC SERPL-MCNC: 145 MG/DL
PLATELET # BLD AUTO: 181 K/UL
POTASSIUM SERPL-SCNC: 4 MMOL/L
PROT SERPL-MCNC: 6.2 G/DL
PSA SERPL-MCNC: 1.36 NG/ML
RBC # BLD: 4.52 M/UL
RBC # FLD: 14.8 %
SODIUM SERPL-SCNC: 148 MMOL/L
TIBC SERPL-MCNC: 357 UG/DL
TRIGL SERPL-MCNC: 201 MG/DL
TSH SERPL-ACNC: 5.46 UIU/ML
UIBC SERPL-MCNC: 252 UG/DL
VIT B12 SERPL-MCNC: 429 PG/ML
WBC # FLD AUTO: 9.6 K/UL

## 2022-02-23 LAB
ALBUMIN SERPL ELPH-MCNC: 4.5 G/DL
ALP BLD-CCNC: 43 U/L
ALT SERPL-CCNC: 14 U/L
ANION GAP SERPL CALC-SCNC: 17 MMOL/L
AST SERPL-CCNC: 14 U/L
BASOPHILS # BLD AUTO: 0.04 K/UL
BASOPHILS NFR BLD AUTO: 0.4 %
BILIRUB SERPL-MCNC: 0.6 MG/DL
BUN SERPL-MCNC: 49 MG/DL
CALCIUM SERPL-MCNC: 9.2 MG/DL
CHLORIDE SERPL-SCNC: 103 MMOL/L
CHOLEST SERPL-MCNC: 215 MG/DL
CO2 SERPL-SCNC: 20 MMOL/L
CREAT SERPL-MCNC: 1.23 MG/DL
EOSINOPHIL # BLD AUTO: 0.05 K/UL
EOSINOPHIL NFR BLD AUTO: 0.5 %
ESTIMATED AVERAGE GLUCOSE: 131 MG/DL
FERRITIN SERPL-MCNC: 53 NG/ML
GLUCOSE SERPL-MCNC: 105 MG/DL
HBA1C MFR BLD HPLC: 6.2 %
HCT VFR BLD CALC: 43.7 %
HDLC SERPL-MCNC: 72 MG/DL
HGB BLD-MCNC: 14.2 G/DL
IMM GRANULOCYTES NFR BLD AUTO: 0.6 %
IRON SATN MFR SERPL: 28 %
IRON SERPL-MCNC: 96 UG/DL
LDLC SERPL CALC-MCNC: 125 MG/DL
LYMPHOCYTES # BLD AUTO: 1.94 K/UL
LYMPHOCYTES NFR BLD AUTO: 17.9 %
MAN DIFF?: NORMAL
MCHC RBC-ENTMCNC: 30.1 PG
MCHC RBC-ENTMCNC: 32.5 GM/DL
MCV RBC AUTO: 92.8 FL
MONOCYTES # BLD AUTO: 0.77 K/UL
MONOCYTES NFR BLD AUTO: 7.1 %
NEUTROPHILS # BLD AUTO: 7.96 K/UL
NEUTROPHILS NFR BLD AUTO: 73.5 %
NONHDLC SERPL-MCNC: 143 MG/DL
PLATELET # BLD AUTO: 202 K/UL
POTASSIUM SERPL-SCNC: 3.6 MMOL/L
PROT SERPL-MCNC: 6.4 G/DL
PSA SERPL-MCNC: 1.38 NG/ML
RBC # BLD: 4.71 M/UL
RBC # FLD: 13.9 %
SODIUM SERPL-SCNC: 140 MMOL/L
TIBC SERPL-MCNC: 339 UG/DL
TRIGL SERPL-MCNC: 88 MG/DL
TSH SERPL-ACNC: 2.79 UIU/ML
UIBC SERPL-MCNC: 243 UG/DL
WBC # FLD AUTO: 10.82 K/UL

## 2022-02-24 LAB
TESTOST FREE SERPL-MCNC: 0.2 PG/ML
TESTOST SERPL-MCNC: <2.5 NG/DL

## 2022-03-14 ENCOUNTER — RX RENEWAL (OUTPATIENT)
Age: 72
End: 2022-03-14

## 2022-03-14 ENCOUNTER — APPOINTMENT (OUTPATIENT)
Dept: INTERNAL MEDICINE | Facility: CLINIC | Age: 72
End: 2022-03-14
Payer: MEDICARE

## 2022-03-14 VITALS
WEIGHT: 160 LBS | SYSTOLIC BLOOD PRESSURE: 122 MMHG | HEIGHT: 62 IN | DIASTOLIC BLOOD PRESSURE: 80 MMHG | TEMPERATURE: 97.9 F | RESPIRATION RATE: 14 BRPM | BODY MASS INDEX: 29.44 KG/M2 | OXYGEN SATURATION: 95 % | HEART RATE: 88 BPM

## 2022-03-14 DIAGNOSIS — R09.82 POSTNASAL DRIP: ICD-10-CM

## 2022-03-14 PROCEDURE — 99213 OFFICE O/P EST LOW 20 MIN: CPT

## 2022-03-14 RX ORDER — AZITHROMYCIN 250 MG/1
250 TABLET, FILM COATED ORAL
Qty: 1 | Refills: 0 | Status: DISCONTINUED | COMMUNITY
Start: 2019-09-28 | End: 2022-03-14

## 2022-03-14 RX ORDER — BENZONATATE 100 MG/1
100 CAPSULE ORAL 3 TIMES DAILY
Qty: 21 | Refills: 0 | Status: DISCONTINUED | COMMUNITY
Start: 2021-01-11 | End: 2022-03-14

## 2022-03-14 NOTE — HISTORY OF PRESENT ILLNESS
[FreeTextEntry8] : cc: congestion\par \par c/o watery discharge and pressure in his ears and head for 1 month. Bringing up mucus. It is watery and clear.

## 2022-05-02 ENCOUNTER — APPOINTMENT (OUTPATIENT)
Dept: INTERNAL MEDICINE | Facility: CLINIC | Age: 72
End: 2022-05-02
Payer: MEDICARE

## 2022-05-02 VITALS
BODY MASS INDEX: 29.44 KG/M2 | DIASTOLIC BLOOD PRESSURE: 76 MMHG | WEIGHT: 160 LBS | HEIGHT: 62 IN | HEART RATE: 81 BPM | RESPIRATION RATE: 16 BRPM | OXYGEN SATURATION: 97 % | SYSTOLIC BLOOD PRESSURE: 120 MMHG | TEMPERATURE: 97.4 F

## 2022-05-02 DIAGNOSIS — J30.9 ALLERGIC RHINITIS, UNSPECIFIED: ICD-10-CM

## 2022-05-02 PROCEDURE — 99214 OFFICE O/P EST MOD 30 MIN: CPT

## 2022-05-02 NOTE — HEALTH RISK ASSESSMENT
[Never] : Never [0] : 2) Feeling down, depressed, or hopeless: Not at all (0) [PHQ-2 Negative - No further assessment needed] : PHQ-2 Negative - No further assessment needed [[id number]] : 0

## 2022-05-02 NOTE — HISTORY OF PRESENT ILLNESS
[FreeTextEntry1] : cold symptoms [de-identified] : Pt has sinus pressure, congestion, cough. Taking allergy pill and nasal spray. Has not been feeling well for a month. \par \par Has prostate cancer and he has a new urologist.

## 2022-06-03 ENCOUNTER — RX RENEWAL (OUTPATIENT)
Age: 72
End: 2022-06-03

## 2022-07-05 ENCOUNTER — RX RENEWAL (OUTPATIENT)
Age: 72
End: 2022-07-05

## 2022-09-07 ENCOUNTER — RX RENEWAL (OUTPATIENT)
Age: 72
End: 2022-09-07

## 2022-10-19 NOTE — CONSULT NOTE ADULT - CONSULT REQUESTED DATE/TIME
Patient lives in [place]. 15-Oct-2018 17:51

## 2022-10-31 ENCOUNTER — LABORATORY RESULT (OUTPATIENT)
Age: 72
End: 2022-10-31

## 2022-11-01 LAB
ALBUMIN SERPL ELPH-MCNC: 4 G/DL
ALP BLD-CCNC: 41 U/L
ALT SERPL-CCNC: 11 U/L
ANION GAP SERPL CALC-SCNC: 15 MMOL/L
AST SERPL-CCNC: 13 U/L
BASOPHILS # BLD AUTO: 0.08 K/UL
BASOPHILS NFR BLD AUTO: 0.6 %
BILIRUB SERPL-MCNC: 0.5 MG/DL
BUN SERPL-MCNC: 42 MG/DL
CALCIUM SERPL-MCNC: 8.8 MG/DL
CHLORIDE SERPL-SCNC: 105 MMOL/L
CHOLEST SERPL-MCNC: 216 MG/DL
CO2 SERPL-SCNC: 23 MMOL/L
CREAT SERPL-MCNC: 1.25 MG/DL
EGFR: 60 ML/MIN/1.73M2
EOSINOPHIL # BLD AUTO: 0.34 K/UL
EOSINOPHIL NFR BLD AUTO: 2.6 %
ESTIMATED AVERAGE GLUCOSE: 137 MG/DL
FERRITIN SERPL-MCNC: 16 NG/ML
FOLATE SERPL-MCNC: 13 NG/ML
GLUCOSE SERPL-MCNC: 92 MG/DL
HBA1C MFR BLD HPLC: 6.4 %
HCT VFR BLD CALC: 43.5 %
HDLC SERPL-MCNC: 56 MG/DL
HGB BLD-MCNC: 14.1 G/DL
IMM GRANULOCYTES NFR BLD AUTO: 0.8 %
IRON SATN MFR SERPL: 17 %
IRON SERPL-MCNC: 65 UG/DL
LDLC SERPL CALC-MCNC: 129 MG/DL
LYMPHOCYTES # BLD AUTO: 2.32 K/UL
LYMPHOCYTES NFR BLD AUTO: 18 %
MAN DIFF?: NORMAL
MCHC RBC-ENTMCNC: 30.7 PG
MCHC RBC-ENTMCNC: 32.4 GM/DL
MCV RBC AUTO: 94.8 FL
MONOCYTES # BLD AUTO: 1.06 K/UL
MONOCYTES NFR BLD AUTO: 8.2 %
NEUTROPHILS # BLD AUTO: 9 K/UL
NEUTROPHILS NFR BLD AUTO: 69.8 %
NONHDLC SERPL-MCNC: 160 MG/DL
PLATELET # BLD AUTO: 175 K/UL
POTASSIUM SERPL-SCNC: 3.9 MMOL/L
PROT SERPL-MCNC: 6 G/DL
PSA SERPL-MCNC: 110 NG/ML
RBC # BLD: 4.59 M/UL
RBC # BLD: 4.59 M/UL
RBC # FLD: 13.5 %
RETICS # AUTO: 1.5 %
RETICS AGGREG/RBC NFR: 67.5 K/UL
SODIUM SERPL-SCNC: 143 MMOL/L
TIBC SERPL-MCNC: 383 UG/DL
TRIGL SERPL-MCNC: 154 MG/DL
TSH SERPL-ACNC: 6.21 UIU/ML
UIBC SERPL-MCNC: 317 UG/DL
VIT B12 SERPL-MCNC: 378 PG/ML
WBC # FLD AUTO: 12.9 K/UL

## 2022-11-22 NOTE — ED PROVIDER NOTE - AGGRAVATING FACTORS
Patient is scheduled for a/an Colonoscopy ([id number]) with NuLytely, under MAC, with Yaritza Clemens MD on 02/27/2023 at [facility].      Prep instructions were mailed to patient.    Nurse:   Please send the procedure prep of GoLytely and the anti-nausea medication Zofran/Ondansetron to the patient's pharmacy and contact patient for any pre-procedure concerns or questions.        [facility]/pharmacy #[id number] [place], [place] - [address] [address] [Patient Preferred]  [phone]           none

## 2022-12-11 ENCOUNTER — RX RENEWAL (OUTPATIENT)
Age: 72
End: 2022-12-11

## 2022-12-23 ENCOUNTER — RX RENEWAL (OUTPATIENT)
Age: 72
End: 2022-12-23

## 2023-01-01 NOTE — H&P PST ADULT - PAIN SCALE PREFERRED, PROFILE
Goal Outcome Evaluation:         Infant VSS, <3N-PASS. Voiding & stooling. No oral cues this shift. Gavage fed over 30 mins. Continue to monitor.                numerical 0-10

## 2023-01-23 ENCOUNTER — RX RENEWAL (OUTPATIENT)
Age: 73
End: 2023-01-23

## 2023-01-25 ENCOUNTER — APPOINTMENT (OUTPATIENT)
Dept: INTERNAL MEDICINE | Facility: CLINIC | Age: 73
End: 2023-01-25
Payer: MEDICARE

## 2023-01-25 VITALS
RESPIRATION RATE: 16 BRPM | HEIGHT: 62 IN | SYSTOLIC BLOOD PRESSURE: 132 MMHG | TEMPERATURE: 97.9 F | HEART RATE: 86 BPM | OXYGEN SATURATION: 96 % | DIASTOLIC BLOOD PRESSURE: 84 MMHG | BODY MASS INDEX: 31.28 KG/M2 | WEIGHT: 170 LBS

## 2023-01-25 DIAGNOSIS — N18.31 CHRONIC KIDNEY DISEASE, STAGE 3A: Chronic | ICD-10-CM

## 2023-01-25 DIAGNOSIS — N13.8 BENIGN PROSTATIC HYPERPLASIA WITH LOWER URINARY TRACT SYMPMS: ICD-10-CM

## 2023-01-25 DIAGNOSIS — N40.1 BENIGN PROSTATIC HYPERPLASIA WITH LOWER URINARY TRACT SYMPMS: ICD-10-CM

## 2023-01-25 PROCEDURE — 99214 OFFICE O/P EST MOD 30 MIN: CPT

## 2023-01-25 RX ORDER — AZITHROMYCIN 250 MG/1
250 TABLET, FILM COATED ORAL
Qty: 1 | Refills: 0 | Status: DISCONTINUED | COMMUNITY
Start: 2022-05-02 | End: 2023-01-25

## 2023-01-25 RX ORDER — FAMOTIDINE 40 MG/1
40 TABLET, FILM COATED ORAL
Qty: 30 | Refills: 0 | Status: DISCONTINUED | COMMUNITY
Start: 2021-01-11 | End: 2023-01-25

## 2023-01-25 RX ORDER — BENZONATATE 100 MG/1
100 CAPSULE ORAL 3 TIMES DAILY
Qty: 20 | Refills: 0 | Status: DISCONTINUED | COMMUNITY
Start: 2022-05-02 | End: 2023-01-25

## 2023-01-25 RX ORDER — TRIAMCINOLONE ACETONIDE 1 MG/G
0.1 OINTMENT TOPICAL TWICE DAILY
Qty: 1 | Refills: 0 | Status: DISCONTINUED | COMMUNITY
Start: 2021-01-06 | End: 2023-01-25

## 2023-01-25 RX ORDER — FLUTICASONE PROPIONATE 50 UG/1
50 SPRAY, METERED NASAL DAILY
Qty: 3 | Refills: 2 | Status: DISCONTINUED | COMMUNITY
Start: 2018-07-16 | End: 2023-01-25

## 2023-01-25 NOTE — HEALTH RISK ASSESSMENT
[0] : 2) Feeling down, depressed, or hopeless: Not at all (0) [PHQ-2 Negative - No further assessment needed] : PHQ-2 Negative - No further assessment needed [[id number]] : 0

## 2023-01-25 NOTE — HISTORY OF PRESENT ILLNESS
[FreeTextEntry1] : med refill [de-identified] : Pt is here for refill of meds. Has gained weight. \par \par He saw the urologist a few months ago. He sees Dr Ashford.

## 2023-03-07 DIAGNOSIS — E53.8 DEFICIENCY OF OTHER SPECIFIED B GROUP VITAMINS: ICD-10-CM

## 2023-03-07 DIAGNOSIS — R97.20 ELEVATED PROSTATE, SPECIFIC ANTIGEN [PSA]: ICD-10-CM

## 2023-03-07 DIAGNOSIS — Z00.00 ENCOUNTER FOR GENERAL ADULT MEDICAL EXAMINATION W/OUT ABNORMAL FINDINGS: ICD-10-CM

## 2023-03-08 LAB
25(OH)D3 SERPL-MCNC: 148 NG/ML
ALBUMIN SERPL ELPH-MCNC: 4 G/DL
ALP BLD-CCNC: 36 U/L
ALT SERPL-CCNC: 13 U/L
ANION GAP SERPL CALC-SCNC: 15 MMOL/L
APPEARANCE: CLEAR
AST SERPL-CCNC: 13 U/L
BACTERIA: NEGATIVE
BASOPHILS # BLD AUTO: 0.07 K/UL
BASOPHILS # BLD AUTO: 0.07 K/UL
BASOPHILS NFR BLD AUTO: 0.6 %
BASOPHILS NFR BLD AUTO: 0.6 %
BILIRUB SERPL-MCNC: 0.8 MG/DL
BILIRUBIN URINE: NEGATIVE
BLOOD URINE: NEGATIVE
BUN SERPL-MCNC: 40 MG/DL
CALCIUM SERPL-MCNC: 9.5 MG/DL
CHLORIDE SERPL-SCNC: 104 MMOL/L
CHOLEST SERPL-MCNC: 225 MG/DL
CO2 SERPL-SCNC: 23 MMOL/L
COLOR: YELLOW
CREAT SERPL-MCNC: 1.04 MG/DL
EGFR: 75 ML/MIN/1.73M2
EOSINOPHIL # BLD AUTO: 0.36 K/UL
EOSINOPHIL # BLD AUTO: 0.36 K/UL
EOSINOPHIL NFR BLD AUTO: 3.2 %
EOSINOPHIL NFR BLD AUTO: 3.2 %
ESTIMATED AVERAGE GLUCOSE: 137 MG/DL
FERRITIN SERPL-MCNC: 67 NG/ML
FOLATE SERPL-MCNC: 14.8 NG/ML
GLUCOSE QUALITATIVE U: NEGATIVE
GLUCOSE SERPL-MCNC: 98 MG/DL
HBA1C MFR BLD HPLC: 6.4 %
HCT VFR BLD CALC: 45.1 %
HCT VFR BLD CALC: 45.7 %
HDLC SERPL-MCNC: 54 MG/DL
HGB BLD-MCNC: 14.3 G/DL
HGB BLD-MCNC: 14.7 G/DL
HYALINE CASTS: 0 /LPF
IMM GRANULOCYTES NFR BLD AUTO: 1.6 %
IMM GRANULOCYTES NFR BLD AUTO: 1.7 %
IRON SATN MFR SERPL: 34 %
IRON SATN MFR SERPL: 35 %
IRON SERPL-MCNC: 117 UG/DL
IRON SERPL-MCNC: 117 UG/DL
KETONES URINE: NEGATIVE
LDLC SERPL CALC-MCNC: 132 MG/DL
LEUKOCYTE ESTERASE URINE: NEGATIVE
LYMPHOCYTES # BLD AUTO: 2.57 K/UL
LYMPHOCYTES # BLD AUTO: 2.67 K/UL
LYMPHOCYTES NFR BLD AUTO: 22.6 %
LYMPHOCYTES NFR BLD AUTO: 23.4 %
MAN DIFF?: NORMAL
MAN DIFF?: NORMAL
MCHC RBC-ENTMCNC: 30.4 PG
MCHC RBC-ENTMCNC: 31.3 GM/DL
MCHC RBC-ENTMCNC: 31.9 PG
MCHC RBC-ENTMCNC: 32.6 GM/DL
MCV RBC AUTO: 97.2 FL
MCV RBC AUTO: 97.8 FL
MICROSCOPIC-UA: NORMAL
MONOCYTES # BLD AUTO: 0.9 K/UL
MONOCYTES # BLD AUTO: 0.92 K/UL
MONOCYTES NFR BLD AUTO: 7.9 %
MONOCYTES NFR BLD AUTO: 8.1 %
NEUTROPHILS # BLD AUTO: 7.21 K/UL
NEUTROPHILS # BLD AUTO: 7.26 K/UL
NEUTROPHILS NFR BLD AUTO: 63.3 %
NEUTROPHILS NFR BLD AUTO: 63.8 %
NITRITE URINE: NEGATIVE
NONHDLC SERPL-MCNC: 171 MG/DL
PH URINE: 6
PLATELET # BLD AUTO: 193 K/UL
PLATELET # BLD AUTO: 198 K/UL
POTASSIUM SERPL-SCNC: 4 MMOL/L
PROT SERPL-MCNC: 5.8 G/DL
PROTEIN URINE: NORMAL
PSA SERPL-MCNC: 3.2 NG/ML
PSA SERPL-MCNC: 3.2 NG/ML
RBC # BLD: 4.61 M/UL
RBC # BLD: 4.61 M/UL
RBC # BLD: 4.7 M/UL
RBC # FLD: 14.6 %
RBC # FLD: 14.6 %
RED BLOOD CELLS URINE: 2 /HPF
RETICS # AUTO: 1.7 %
RETICS AGGREG/RBC NFR: 79.8 K/UL
SODIUM SERPL-SCNC: 142 MMOL/L
SPECIFIC GRAVITY URINE: 1.03
SQUAMOUS EPITHELIAL CELLS: 0 /HPF
TIBC SERPL-MCNC: 336 UG/DL
TIBC SERPL-MCNC: 343 UG/DL
TRIGL SERPL-MCNC: 195 MG/DL
TSH SERPL-ACNC: 4.01 UIU/ML
UIBC SERPL-MCNC: 219 UG/DL
UIBC SERPL-MCNC: 226 UG/DL
UROBILINOGEN URINE: NORMAL
VIT B12 SERPL-MCNC: >2000 PG/ML
WBC # FLD AUTO: 11.37 K/UL
WBC # FLD AUTO: 11.39 K/UL
WHITE BLOOD CELLS URINE: 1 /HPF

## 2023-03-15 ENCOUNTER — APPOINTMENT (OUTPATIENT)
Dept: INTERNAL MEDICINE | Facility: CLINIC | Age: 73
End: 2023-03-15
Payer: MEDICARE

## 2023-03-15 VITALS
DIASTOLIC BLOOD PRESSURE: 86 MMHG | BODY MASS INDEX: 31.28 KG/M2 | WEIGHT: 170 LBS | HEART RATE: 102 BPM | SYSTOLIC BLOOD PRESSURE: 147 MMHG | HEIGHT: 62 IN

## 2023-03-15 VITALS — DIASTOLIC BLOOD PRESSURE: 88 MMHG | SYSTOLIC BLOOD PRESSURE: 140 MMHG

## 2023-03-15 PROCEDURE — 99214 OFFICE O/P EST MOD 30 MIN: CPT

## 2023-03-15 NOTE — HISTORY OF PRESENT ILLNESS
[FreeTextEntry1] : BP check and lab results [de-identified] : Pt is here to review labs. He has prostate ca and being treated with good response. \par \par Requesting med refills.

## 2023-04-09 ENCOUNTER — RX RENEWAL (OUTPATIENT)
Age: 73
End: 2023-04-09

## 2023-05-01 ENCOUNTER — APPOINTMENT (OUTPATIENT)
Dept: COLORECTAL SURGERY | Facility: CLINIC | Age: 73
End: 2023-05-01
Payer: MEDICARE

## 2023-05-01 ENCOUNTER — NON-APPOINTMENT (OUTPATIENT)
Age: 73
End: 2023-05-01

## 2023-05-01 VITALS
HEART RATE: 111 BPM | DIASTOLIC BLOOD PRESSURE: 91 MMHG | WEIGHT: 170 LBS | BODY MASS INDEX: 31.28 KG/M2 | HEIGHT: 62 IN | SYSTOLIC BLOOD PRESSURE: 168 MMHG

## 2023-05-01 PROCEDURE — 46221 LIGATION OF HEMORRHOID(S): CPT

## 2023-05-01 PROCEDURE — 99214 OFFICE O/P EST MOD 30 MIN: CPT | Mod: 25

## 2023-05-01 NOTE — PHYSICAL EXAM
[Normal rectal exam] : exam was normal [Reduce Spontaneously] : a spontaneously reducible (grade II) [Skin Tags] : there were no residual hemorrhoidal skin tags seen [Normal] : was normal [None] : there was no rectal mass  [Gross Blood] : no gross blood [No Rash or Lesion] : No rash or lesion [Alert] : alert [Oriented to Person] : oriented to person [Oriented to Place] : oriented to place [Oriented to Time] : oriented to time [Calm] : calm [de-identified] : No apparent distress [de-identified] : Normocephalic atraumatic [de-identified] : Moving all extremities x4

## 2023-05-01 NOTE — ASSESSMENT
[FreeTextEntry1] : Mr. De Paz presents to the office for a postoperative visit.  Overall, he is progressing as expected with the anticipated post procedure drainage and anorectal discomfort.  In office today, we proceeded to debride his wounds with silver nitrate and the right seton was removed.  He is to return to the office in 1 week's time for us to further debride the external wounds and likely remove the left seton.  He understands, and is agreeable.\par \par 11/25/20 Doing well. Operative report, intraop cultures provided to patient for records. Discussed pt progress with daughter over telephone. Encouraged increased po intake. Discussed blood draw at this visit but patient declined.\par Advised initiation of hormonal treatment of prostate cancer in mid December when he is more fully healed.\par \par 6/21/21 Mr. De Paz presents to the office for follow-up.  Since his last office visit 2 weeks earlier, he reports resolution of right-sided discomfort.  On physical exam, there is the expected induration of his anterior rectum status post internal fistulotomy.  The right anterior external wound has an exposed wound base of approximately 1 cm but on probing, does not tunnel.  It is fairly shallow and was cauterized with silver nitrate.  There is no obvious evidence of infection or of an anal rectal fistula.  Based on his clinical asymptomatic state and the unimpressive physical exam, I have recommended no additional surgery including EUA.  His daughter was updated by telephone and voicemail at [phone].\par \par 5/1/23 Rickie returns to the office for followup. He noted some rectal bleeding this AM and is here for eval. Anoscopy revealed full Grade II IH and a rubber band was placed on RP bundle to address the site. Advised to followup in 2 - 3 weeks. Hydrocortisone cream 2.5% for symptomatic relief.

## 2023-05-01 NOTE — HISTORY OF PRESENT ILLNESS
[FreeTextEntry1] : 11/9/20 Drainage of anterior horseshoe abscess with B/L seton placement\par Mr. De Paz presents to the office for a postoperative visit.  Overall, he is doing fairly well.  He notes that there is ongoing perirectal and wound drainage, however, it is gradually decreasing in amount.  Bowel movements are passed regularly and without significant bleeding but with expected anorectal pain and discomfort.\par \par 11/25/20 Returns to office for followup. Reports that he is doing well. Feels improved. Daughter states that he is weak and not eating much. Dad states the weakness is from arthritis, not from feeling unwell secondary to rectal surgery. BMs passed without issue. No significant pain. Drainage continues but improving. No F/C.\par \par 6/21/21 Returns to office for followup. In interim, approx 2 weeks earlier, was seen in office by another physician secondary to concerns of increasing pain on the right side of his buttock.  There was concern for a fistula and that exam under anesthesia would be necessary.  Since that earlier visit, he reports resolution of pain with no drainage from the external fistula site.  There is no bleeding, no fevers or chills and no pain with defecation.  Bowel movements are passed normally without issues.  He has completed treatment for prostate cancer.\par \par 5/1/23 Rickie returns to office for followup. He reports rectal bleeding noted this AM. No reports of anorectal pain, constipation or straining.

## 2023-05-17 ENCOUNTER — APPOINTMENT (OUTPATIENT)
Dept: COLORECTAL SURGERY | Facility: CLINIC | Age: 73
End: 2023-05-17
Payer: MEDICARE

## 2023-05-17 VITALS
RESPIRATION RATE: 16 BRPM | BODY MASS INDEX: 31.28 KG/M2 | WEIGHT: 170 LBS | HEIGHT: 62 IN | TEMPERATURE: 98.3 F | DIASTOLIC BLOOD PRESSURE: 91 MMHG | HEART RATE: 102 BPM | SYSTOLIC BLOOD PRESSURE: 148 MMHG

## 2023-05-17 PROCEDURE — 99214 OFFICE O/P EST MOD 30 MIN: CPT | Mod: 25

## 2023-05-17 PROCEDURE — 46221 LIGATION OF HEMORRHOID(S): CPT

## 2023-05-17 NOTE — HISTORY OF PRESENT ILLNESS
[FreeTextEntry1] : 11/9/20 Drainage of anterior horseshoe abscess with B/L seton placement\par Mr. De Paz presents to the office for a postoperative visit.  Overall, he is doing fairly well.  He notes that there is ongoing perirectal and wound drainage, however, it is gradually decreasing in amount.  Bowel movements are passed regularly and without significant bleeding but with expected anorectal pain and discomfort.\par \par 11/25/20 Returns to office for followup. Reports that he is doing well. Feels improved. Daughter states that he is weak and not eating much. Dad states the weakness is from arthritis, not from feeling unwell secondary to rectal surgery. BMs passed without issue. No significant pain. Drainage continues but improving. No F/C.\par \par 6/21/21 Returns to office for followup. In interim, approx 2 weeks earlier, was seen in office by another physician secondary to concerns of increasing pain on the right side of his buttock.  There was concern for a fistula and that exam under anesthesia would be necessary.  Since that earlier visit, he reports resolution of pain with no drainage from the external fistula site.  There is no bleeding, no fevers or chills and no pain with defecation.  Bowel movements are passed normally without issues.  He has completed treatment for prostate cancer.\par \par 5/1/23 Rickie returns to office for followup. He reports rectal bleeding noted this AM. No reports of anorectal pain, constipation or straining.\par \par 5/17/23 Rickie returns to the office for follow-up.  Overall, his rectal bleeding has improved.  Here for further ligations.

## 2023-05-17 NOTE — ASSESSMENT
[FreeTextEntry1] : Mr. De Paz presents to the office for a postoperative visit.  Overall, he is progressing as expected with the anticipated post procedure drainage and anorectal discomfort.  In office today, we proceeded to debride his wounds with silver nitrate and the right seton was removed.  He is to return to the office in 1 week's time for us to further debride the external wounds and likely remove the left seton.  He understands, and is agreeable.\par \par 11/25/20 Doing well. Operative report, intraop cultures provided to patient for records. Discussed pt progress with daughter over telephone. Encouraged increased po intake. Discussed blood draw at this visit but patient declined.\par Advised initiation of hormonal treatment of prostate cancer in mid December when he is more fully healed.\par \par 6/21/21 Mr. De Paz presents to the office for follow-up.  Since his last office visit 2 weeks earlier, he reports resolution of right-sided discomfort.  On physical exam, there is the expected induration of his anterior rectum status post internal fistulotomy.  The right anterior external wound has an exposed wound base of approximately 1 cm but on probing, does not tunnel.  It is fairly shallow and was cauterized with silver nitrate.  There is no obvious evidence of infection or of an anal rectal fistula.  Based on his clinical asymptomatic state and the unimpressive physical exam, I have recommended no additional surgery including EUA.  His daughter was updated by telephone and voicemail at [phone].\par \par 5/1/23 Rickie returns to the office for followup. He noted some rectal bleeding this AM and is here for eval. Anoscopy revealed full Grade II IH and a rubber band was placed on RP bundle to address the site. Advised to followup in 2 - 3 weeks. Hydrocortisone cream 2.5% for symptomatic relief.\par \par 5/17/23 Rickie returns to the office for follow-up.  Overall, his rectal bleeding has improved.  In office today, we proceeded to apply rubber bands x2 to his anterior column.  Given his overall improvement, I have advised him to follow-up as needed.

## 2023-05-17 NOTE — PHYSICAL EXAM
[Normal rectal exam] : exam was normal [Reduce Spontaneously] : a spontaneously reducible (grade II) [Skin Tags] : there were no residual hemorrhoidal skin tags seen [Normal] : was normal [None] : there was no rectal mass  [Gross Blood] : no gross blood [No Rash or Lesion] : No rash or lesion [Alert] : alert [Oriented to Person] : oriented to person [Oriented to Place] : oriented to place [Oriented to Time] : oriented to time [Calm] : calm [de-identified] : No apparent distress [de-identified] : Normocephalic atraumatic [de-identified] : Moving all extremities x4

## 2023-05-24 ENCOUNTER — RX RENEWAL (OUTPATIENT)
Age: 73
End: 2023-05-24

## 2023-06-14 LAB
ALBUMIN SERPL ELPH-MCNC: 4.1 G/DL
ALP BLD-CCNC: 32 U/L
ALT SERPL-CCNC: 14 U/L
ANION GAP SERPL CALC-SCNC: 12 MMOL/L
AST SERPL-CCNC: 16 U/L
BILIRUB SERPL-MCNC: 0.7 MG/DL
BUN SERPL-MCNC: 32 MG/DL
CALCIUM SERPL-MCNC: 9.7 MG/DL
CHLORIDE SERPL-SCNC: 103 MMOL/L
CO2 SERPL-SCNC: 26 MMOL/L
CREAT SERPL-MCNC: 1.03 MG/DL
EGFR: 76 ML/MIN/1.73M2
GLUCOSE SERPL-MCNC: 114 MG/DL
POTASSIUM SERPL-SCNC: 3.7 MMOL/L
PROT SERPL-MCNC: 5.8 G/DL
PSA SERPL-MCNC: 1.81 NG/ML
SODIUM SERPL-SCNC: 141 MMOL/L

## 2023-06-28 ENCOUNTER — RX RENEWAL (OUTPATIENT)
Age: 73
End: 2023-06-28

## 2023-07-11 ENCOUNTER — APPOINTMENT (OUTPATIENT)
Dept: COLORECTAL SURGERY | Facility: CLINIC | Age: 73
End: 2023-07-11
Payer: MEDICARE

## 2023-07-11 VITALS
DIASTOLIC BLOOD PRESSURE: 83 MMHG | RESPIRATION RATE: 14 BRPM | OXYGEN SATURATION: 96 % | SYSTOLIC BLOOD PRESSURE: 132 MMHG | WEIGHT: 170 LBS | HEIGHT: 62 IN | TEMPERATURE: 97.3 F | BODY MASS INDEX: 31.28 KG/M2 | HEART RATE: 80 BPM

## 2023-07-11 PROCEDURE — 46221 LIGATION OF HEMORRHOID(S): CPT

## 2023-07-11 PROCEDURE — 99214 OFFICE O/P EST MOD 30 MIN: CPT | Mod: 25

## 2023-07-11 RX ORDER — HYDROCORTISONE 25 MG/G
2.5 CREAM TOPICAL
Qty: 1 | Refills: 0 | Status: ACTIVE | COMMUNITY
Start: 2023-07-11 | End: 1900-01-01

## 2023-07-12 NOTE — PHYSICAL EXAM
[Manually Reducible] : a manually reducible (grade III) [Tender, Swollen] : tender, swollen [Normal] : was normal [None] : there was no rectal mass  [Normal Breath Sounds] : Normal breath sounds [Normal Heart Sounds] : normal heart sounds [No Rash or Lesion] : No rash or lesion [Alert] : alert [Oriented to Person] : oriented to person [Oriented to Place] : oriented to place [Oriented to Time] : oriented to time [Calm] : calm [Abdomen Masses] : No abdominal masses [Abdomen Tenderness] : ~T No ~M abdominal tenderness [JVD] : no jugular venous distention  [de-identified] : looks well nad

## 2023-07-12 NOTE — PROCEDURE
[FreeTextEntry1] : after consent anoscopy and rubber band ligation x 2 internal hemorrhoids done. no complications

## 2023-08-14 ENCOUNTER — APPOINTMENT (OUTPATIENT)
Dept: COLORECTAL SURGERY | Facility: CLINIC | Age: 73
End: 2023-08-14
Payer: MEDICARE

## 2023-08-14 VITALS
RESPIRATION RATE: 14 BRPM | HEIGHT: 62 IN | HEART RATE: 103 BPM | OXYGEN SATURATION: 93 % | TEMPERATURE: 97.8 F | DIASTOLIC BLOOD PRESSURE: 91 MMHG | WEIGHT: 170 LBS | BODY MASS INDEX: 31.28 KG/M2 | SYSTOLIC BLOOD PRESSURE: 153 MMHG

## 2023-08-14 DIAGNOSIS — K64.8 OTHER HEMORRHOIDS: ICD-10-CM

## 2023-08-14 PROCEDURE — 46221 LIGATION OF HEMORRHOID(S): CPT

## 2023-08-14 NOTE — PROCEDURE
[FreeTextEntry1] : Anoscopy  I discussed the reason for the procedure, and the risks and benefits with the patient. Risks including bleeding and discomfort were discussed. The patient understood or discussion and would like to proceed with the procedure.  After completing a digital rectal exam a well lubricated anoscope was gently inserted into the anus. Complete inspection was performed. No tenderness on insertion of the anoscope, and the procedure was tolerated well. No fissures, fistula openings, enlarged hemorrhoids or masses were identified.  Findings on anoscopy: Slightly prominent internal hemorrhoids. Banding of RA x 2 performed

## 2023-08-14 NOTE — PHYSICAL EXAM
[Normal rectal exam] : exam was normal [Excoriation] : no perianal excoriation [Multiple Sinus Tracts] : no perianal sinus tracts [Fistula] : no fistulas [Wart] : no warts [Pilonidal Cyst] : no pilonidal cysts [Pilonidal Sinus] : no pilonidal sinus [Tender, Swollen] : nontender, non-swollen [Nonprolapsing] : a nonprolapsing (grade I) [Normal] : was normal [None] : there was no rectal abscess [Alert] : alert [Oriented to Person] : oriented to person [Oriented to Place] : oriented to place [Oriented to Time] : oriented to time [Calm] : calm [de-identified] : [place]: Nontender, no gross blood, slightly prominent internal hemorrhoids [de-identified] : NAD [de-identified] : Nonlabored [de-identified] : Normal rate

## 2023-08-14 NOTE — HISTORY OF PRESENT ILLNESS
[FreeTextEntry1] : 73 year old male who presents after noticing blood in the toilet bowl and on wiping this morning. No other complaints. Patient has a history of prolapsing bleeding internal hemorrhoids with prior banding in the office.

## 2023-08-14 NOTE — ASSESSMENT
[FreeTextEntry1] : 73 year old male with at least grade I internal hemorrhoids. Findings on anoscopy: Slightly prominent internal hemorrhoids. Banding of RA x 2 performed  Plan of care for Hemorrhoids Add daily fiber supplementation to diet, Metamucil, Benefiber, or fiber supplement of preference Daily over the counter stool softener (eg.Colace) to prevent straining Increased fluid intake, preferably water Refrain from straining or lingering (eg. reading) on the toilet Post band ligation instruction given - For worsening pain, fevers, or trouble urinating, patient advised to call office to arrange for urgent re-evaluation. Patient will follow up as needed

## 2023-08-22 ENCOUNTER — RX RENEWAL (OUTPATIENT)
Age: 73
End: 2023-08-22

## 2023-08-23 ENCOUNTER — APPOINTMENT (OUTPATIENT)
Dept: COLORECTAL SURGERY | Facility: CLINIC | Age: 73
End: 2023-08-23

## 2023-09-16 LAB
ALBUMIN SERPL ELPH-MCNC: 4.2 G/DL
ALP BLD-CCNC: 45 U/L
ALT SERPL-CCNC: 14 U/L
ANION GAP SERPL CALC-SCNC: 19 MMOL/L
AST SERPL-CCNC: 18 U/L
BASOPHILS # BLD AUTO: 0.04 K/UL
BASOPHILS NFR BLD AUTO: 0.3 %
BILIRUB SERPL-MCNC: 0.5 MG/DL
BUN SERPL-MCNC: 31 MG/DL
CALCIUM SERPL-MCNC: 9.9 MG/DL
CHLORIDE SERPL-SCNC: 98 MMOL/L
CHOLEST SERPL-MCNC: 192 MG/DL
CO2 SERPL-SCNC: 22 MMOL/L
CREAT SERPL-MCNC: 1.23 MG/DL
EGFR: 61 ML/MIN/1.73M2
EOSINOPHIL # BLD AUTO: 0.06 K/UL
EOSINOPHIL NFR BLD AUTO: 0.4 %
ESTIMATED AVERAGE GLUCOSE: 131 MG/DL
GLUCOSE SERPL-MCNC: 73 MG/DL
HBA1C MFR BLD HPLC: 6.2 %
HCT VFR BLD CALC: 38.8 %
HDLC SERPL-MCNC: 63 MG/DL
HGB BLD-MCNC: 13.2 G/DL
IMM GRANULOCYTES NFR BLD AUTO: 0.5 %
LDLC SERPL CALC-MCNC: 98 MG/DL
LYMPHOCYTES # BLD AUTO: 2.11 K/UL
LYMPHOCYTES NFR BLD AUTO: 13.7 %
MAN DIFF?: NORMAL
MCHC RBC-ENTMCNC: 31.4 PG
MCHC RBC-ENTMCNC: 34 GM/DL
MCV RBC AUTO: 92.2 FL
MONOCYTES # BLD AUTO: 1.31 K/UL
MONOCYTES NFR BLD AUTO: 8.5 %
NEUTROPHILS # BLD AUTO: 11.83 K/UL
NEUTROPHILS NFR BLD AUTO: 76.6 %
NONHDLC SERPL-MCNC: 129 MG/DL
PLATELET # BLD AUTO: 284 K/UL
POTASSIUM SERPL-SCNC: 3.5 MMOL/L
PROT SERPL-MCNC: 6 G/DL
PSA SERPL-MCNC: 1.08 NG/ML
RBC # BLD: 4.21 M/UL
RBC # FLD: 12.5 %
SODIUM SERPL-SCNC: 139 MMOL/L
TRIGL SERPL-MCNC: 181 MG/DL
TSH SERPL-ACNC: 1.96 UIU/ML
WBC # FLD AUTO: 15.42 K/UL

## 2023-09-20 ENCOUNTER — APPOINTMENT (OUTPATIENT)
Dept: COLORECTAL SURGERY | Facility: CLINIC | Age: 73
End: 2023-09-20

## 2023-09-21 ENCOUNTER — APPOINTMENT (OUTPATIENT)
Dept: INTERNAL MEDICINE | Facility: CLINIC | Age: 73
End: 2023-09-21
Payer: MEDICARE

## 2023-09-21 ENCOUNTER — EMERGENCY (EMERGENCY)
Facility: HOSPITAL | Age: 73
LOS: 1 days | Discharge: HOME | End: 2023-09-21
Attending: EMERGENCY MEDICINE | Admitting: EMERGENCY MEDICINE
Payer: MEDICARE

## 2023-09-21 VITALS
DIASTOLIC BLOOD PRESSURE: 77 MMHG | TEMPERATURE: 99 F | RESPIRATION RATE: 17 BRPM | SYSTOLIC BLOOD PRESSURE: 145 MMHG | HEART RATE: 67 BPM | OXYGEN SATURATION: 98 %

## 2023-09-21 VITALS
BODY MASS INDEX: 31.28 KG/M2 | OXYGEN SATURATION: 95 % | RESPIRATION RATE: 14 BRPM | WEIGHT: 170 LBS | HEIGHT: 62 IN | HEART RATE: 79 BPM | SYSTOLIC BLOOD PRESSURE: 126 MMHG | DIASTOLIC BLOOD PRESSURE: 88 MMHG | TEMPERATURE: 97.9 F

## 2023-09-21 VITALS
HEIGHT: 66 IN | RESPIRATION RATE: 15 BRPM | WEIGHT: 160.06 LBS | HEART RATE: 89 BPM | DIASTOLIC BLOOD PRESSURE: 93 MMHG | OXYGEN SATURATION: 97 % | SYSTOLIC BLOOD PRESSURE: 156 MMHG | TEMPERATURE: 99 F

## 2023-09-21 DIAGNOSIS — Z98.890 OTHER SPECIFIED POSTPROCEDURAL STATES: Chronic | ICD-10-CM

## 2023-09-21 DIAGNOSIS — R10.11 RIGHT UPPER QUADRANT PAIN: ICD-10-CM

## 2023-09-21 DIAGNOSIS — K40.90 UNILATERAL INGUINAL HERNIA, WITHOUT OBSTRUCTION OR GANGRENE, NOT SPECIFIED AS RECURRENT: Chronic | ICD-10-CM

## 2023-09-21 LAB
ALBUMIN SERPL ELPH-MCNC: 3.6 G/DL — SIGNIFICANT CHANGE UP (ref 3.3–5)
ALP SERPL-CCNC: 37 U/L — SIGNIFICANT CHANGE UP (ref 30–120)
ALT FLD-CCNC: 24 U/L — SIGNIFICANT CHANGE UP (ref 10–60)
ANION GAP SERPL CALC-SCNC: 12 MMOL/L — SIGNIFICANT CHANGE UP (ref 5–17)
APPEARANCE UR: CLEAR — SIGNIFICANT CHANGE UP
AST SERPL-CCNC: 20 U/L — SIGNIFICANT CHANGE UP (ref 10–40)
BASOPHILS # BLD AUTO: 0.06 K/UL — SIGNIFICANT CHANGE UP (ref 0–0.2)
BASOPHILS NFR BLD AUTO: 0.5 % — SIGNIFICANT CHANGE UP (ref 0–2)
BILIRUB SERPL-MCNC: 0.6 MG/DL — SIGNIFICANT CHANGE UP (ref 0.2–1.2)
BILIRUB UR-MCNC: NEGATIVE — SIGNIFICANT CHANGE UP
BUN SERPL-MCNC: 23 MG/DL — SIGNIFICANT CHANGE UP (ref 7–23)
CALCIUM SERPL-MCNC: 9.7 MG/DL — SIGNIFICANT CHANGE UP (ref 8.4–10.5)
CHLORIDE SERPL-SCNC: 98 MMOL/L — SIGNIFICANT CHANGE UP (ref 96–108)
CO2 SERPL-SCNC: 28 MMOL/L — SIGNIFICANT CHANGE UP (ref 22–31)
COLOR SPEC: YELLOW — SIGNIFICANT CHANGE UP
CREAT SERPL-MCNC: 0.99 MG/DL — SIGNIFICANT CHANGE UP (ref 0.5–1.3)
DIFF PNL FLD: NEGATIVE — SIGNIFICANT CHANGE UP
EGFR: 79 ML/MIN/1.73M2 — SIGNIFICANT CHANGE UP
EOSINOPHIL # BLD AUTO: 0.2 K/UL — SIGNIFICANT CHANGE UP (ref 0–0.5)
EOSINOPHIL NFR BLD AUTO: 1.5 % — SIGNIFICANT CHANGE UP (ref 0–6)
GLUCOSE SERPL-MCNC: 117 MG/DL — HIGH (ref 70–99)
GLUCOSE UR QL: NEGATIVE MG/DL — SIGNIFICANT CHANGE UP
HCT VFR BLD CALC: 43.8 % — SIGNIFICANT CHANGE UP (ref 39–50)
HGB BLD-MCNC: 14.1 G/DL — SIGNIFICANT CHANGE UP (ref 13–17)
IMM GRANULOCYTES NFR BLD AUTO: 1.6 % — HIGH (ref 0–0.9)
KETONES UR-MCNC: NEGATIVE MG/DL — SIGNIFICANT CHANGE UP
LEUKOCYTE ESTERASE UR-ACNC: NEGATIVE — SIGNIFICANT CHANGE UP
LIDOCAIN IGE QN: 53 U/L — SIGNIFICANT CHANGE UP (ref 16–77)
LYMPHOCYTES # BLD AUTO: 17.8 % — SIGNIFICANT CHANGE UP (ref 13–44)
LYMPHOCYTES # BLD AUTO: 2.35 K/UL — SIGNIFICANT CHANGE UP (ref 1–3.3)
MCHC RBC-ENTMCNC: 30.3 PG — SIGNIFICANT CHANGE UP (ref 27–34)
MCHC RBC-ENTMCNC: 32.2 GM/DL — SIGNIFICANT CHANGE UP (ref 32–36)
MCV RBC AUTO: 94 FL — SIGNIFICANT CHANGE UP (ref 80–100)
MONOCYTES # BLD AUTO: 1.05 K/UL — HIGH (ref 0–0.9)
MONOCYTES NFR BLD AUTO: 8 % — SIGNIFICANT CHANGE UP (ref 2–14)
NEUTROPHILS # BLD AUTO: 9.3 K/UL — HIGH (ref 1.8–7.4)
NEUTROPHILS NFR BLD AUTO: 70.6 % — SIGNIFICANT CHANGE UP (ref 43–77)
NITRITE UR-MCNC: NEGATIVE — SIGNIFICANT CHANGE UP
NRBC # BLD: 0 /100 WBCS — SIGNIFICANT CHANGE UP (ref 0–0)
PH UR: 6 — SIGNIFICANT CHANGE UP (ref 5–8)
PLATELET # BLD AUTO: 287 K/UL — SIGNIFICANT CHANGE UP (ref 150–400)
POTASSIUM SERPL-MCNC: 3.1 MMOL/L — LOW (ref 3.5–5.3)
POTASSIUM SERPL-SCNC: 3.1 MMOL/L — LOW (ref 3.5–5.3)
PROT SERPL-MCNC: 6.9 G/DL — SIGNIFICANT CHANGE UP (ref 6–8.3)
PROT UR-MCNC: NEGATIVE MG/DL — SIGNIFICANT CHANGE UP
RBC # BLD: 4.66 M/UL — SIGNIFICANT CHANGE UP (ref 4.2–5.8)
RBC # FLD: 12.3 % — SIGNIFICANT CHANGE UP (ref 10.3–14.5)
SODIUM SERPL-SCNC: 138 MMOL/L — SIGNIFICANT CHANGE UP (ref 135–145)
SP GR SPEC: 1.02 — SIGNIFICANT CHANGE UP (ref 1–1.03)
TROPONIN I, HIGH SENSITIVITY RESULT: 7.4 NG/L — SIGNIFICANT CHANGE UP
UROBILINOGEN FLD QL: 0.2 MG/DL — SIGNIFICANT CHANGE UP (ref 0.2–1)
WBC # BLD: 13.17 K/UL — HIGH (ref 3.8–10.5)
WBC # FLD AUTO: 13.17 K/UL — HIGH (ref 3.8–10.5)

## 2023-09-21 PROCEDURE — 71275 CT ANGIOGRAPHY CHEST: CPT | Mod: MA

## 2023-09-21 PROCEDURE — 96365 THER/PROPH/DIAG IV INF INIT: CPT | Mod: XU

## 2023-09-21 PROCEDURE — 81003 URINALYSIS AUTO W/O SCOPE: CPT

## 2023-09-21 PROCEDURE — 96361 HYDRATE IV INFUSION ADD-ON: CPT

## 2023-09-21 PROCEDURE — 83690 ASSAY OF LIPASE: CPT

## 2023-09-21 PROCEDURE — 99213 OFFICE O/P EST LOW 20 MIN: CPT | Mod: 25

## 2023-09-21 PROCEDURE — 71275 CT ANGIOGRAPHY CHEST: CPT | Mod: 26,MA

## 2023-09-21 PROCEDURE — 93005 ELECTROCARDIOGRAM TRACING: CPT

## 2023-09-21 PROCEDURE — 99285 EMERGENCY DEPT VISIT HI MDM: CPT | Mod: 25

## 2023-09-21 PROCEDURE — 74177 CT ABD & PELVIS W/CONTRAST: CPT | Mod: 26,MA

## 2023-09-21 PROCEDURE — 84484 ASSAY OF TROPONIN QUANT: CPT

## 2023-09-21 PROCEDURE — 76700 US EXAM ABDOM COMPLETE: CPT | Mod: 26

## 2023-09-21 PROCEDURE — 74177 CT ABD & PELVIS W/CONTRAST: CPT | Mod: MA

## 2023-09-21 PROCEDURE — 87086 URINE CULTURE/COLONY COUNT: CPT

## 2023-09-21 PROCEDURE — 85025 COMPLETE CBC W/AUTO DIFF WBC: CPT

## 2023-09-21 PROCEDURE — 76700 US EXAM ABDOM COMPLETE: CPT

## 2023-09-21 PROCEDURE — 36415 COLL VENOUS BLD VENIPUNCTURE: CPT

## 2023-09-21 PROCEDURE — 80053 COMPREHEN METABOLIC PANEL: CPT

## 2023-09-21 PROCEDURE — 93010 ELECTROCARDIOGRAM REPORT: CPT

## 2023-09-21 PROCEDURE — 99285 EMERGENCY DEPT VISIT HI MDM: CPT

## 2023-09-21 RX ORDER — LEVOCETIRIZINE DIHYDROCHLORIDE 0.5 MG/ML
1 SOLUTION ORAL
Qty: 0 | Refills: 0 | DISCHARGE

## 2023-09-21 RX ORDER — AMLODIPINE BESYLATE 2.5 MG/1
1 TABLET ORAL
Qty: 0 | Refills: 0 | DISCHARGE

## 2023-09-21 RX ORDER — OMEPRAZOLE 10 MG/1
1 CAPSULE, DELAYED RELEASE ORAL
Qty: 0 | Refills: 0 | DISCHARGE

## 2023-09-21 RX ORDER — POTASSIUM CHLORIDE 20 MEQ
40 PACKET (EA) ORAL ONCE
Refills: 0 | Status: COMPLETED | OUTPATIENT
Start: 2023-09-21 | End: 2023-09-21

## 2023-09-21 RX ORDER — FLUTICASONE PROPIONATE 50 MCG
2 SPRAY, SUSPENSION NASAL
Qty: 0 | Refills: 0 | DISCHARGE

## 2023-09-21 RX ORDER — LISINOPRIL 2.5 MG/1
1 TABLET ORAL
Qty: 0 | Refills: 0 | DISCHARGE

## 2023-09-21 RX ORDER — ACETAMINOPHEN 500 MG
1000 TABLET ORAL ONCE
Refills: 0 | Status: COMPLETED | OUTPATIENT
Start: 2023-09-21 | End: 2023-09-21

## 2023-09-21 RX ORDER — HYDROCHLOROTHIAZIDE 25 MG
1 TABLET ORAL
Qty: 0 | Refills: 0 | DISCHARGE

## 2023-09-21 RX ORDER — SODIUM CHLORIDE 9 MG/ML
1000 INJECTION INTRAMUSCULAR; INTRAVENOUS; SUBCUTANEOUS ONCE
Refills: 0 | Status: COMPLETED | OUTPATIENT
Start: 2023-09-21 | End: 2023-09-21

## 2023-09-21 RX ORDER — CETIRIZINE HYDROCHLORIDE 10 MG/1
1 TABLET ORAL
Refills: 0 | DISCHARGE

## 2023-09-21 RX ORDER — MELOXICAM 15 MG/1
1 TABLET ORAL
Qty: 0 | Refills: 0 | DISCHARGE

## 2023-09-21 RX ORDER — ROSUVASTATIN CALCIUM 5 MG/1
1 TABLET ORAL
Refills: 0 | DISCHARGE

## 2023-09-21 RX ORDER — CELECOXIB 200 MG/1
1 CAPSULE ORAL
Refills: 0 | DISCHARGE

## 2023-09-21 RX ADMIN — SODIUM CHLORIDE 1000 MILLILITER(S): 9 INJECTION INTRAMUSCULAR; INTRAVENOUS; SUBCUTANEOUS at 10:51

## 2023-09-21 RX ADMIN — Medication 1000 MILLIGRAM(S): at 13:00

## 2023-09-21 RX ADMIN — SODIUM CHLORIDE 1000 MILLILITER(S): 9 INJECTION INTRAMUSCULAR; INTRAVENOUS; SUBCUTANEOUS at 12:10

## 2023-09-21 RX ADMIN — Medication 400 MILLIGRAM(S): at 12:40

## 2023-09-21 RX ADMIN — Medication 40 MILLIEQUIVALENT(S): at 11:55

## 2023-09-21 NOTE — ED PROVIDER NOTE - CLINICAL SUMMARY MEDICAL DECISION MAKING FREE TEXT BOX
Patient referred by PMD for few days of right upper quadrant abdominal pain.  Patient denies fevers chills chest pain short of breath cough nausea vomiting diarrhea dysuria hematuria frequency or any other complaints.  PMD Huysman    Plan EKG labs abdominal ultrasound IV fluid    Differential including but not limited to MI esophagitis reflux gastritis pancreatitis enteritis cholecystitis

## 2023-09-21 NOTE — ED PROVIDER NOTE - PATIENT PORTAL LINK FT
You can access the FollowMyHealth Patient Portal offered by [facility] by registering at the following website: [email or web address]. By joining [employer]’s FollowMyHealth portal, you will also be able to view your health information using other applications (apps) compatible with our system.

## 2023-09-21 NOTE — ED PROVIDER NOTE - DIFFERENTIAL DIAGNOSIS
Differential including but not limited to MI esophagitis reflux gastritis pancreatitis enteritis cholecystitis Differential Diagnosis

## 2023-09-21 NOTE — ED ADULT NURSE NOTE - NSFALLUNIVINTERV_ED_ALL_ED
Bed/Stretcher in lowest position, wheels locked, appropriate side rails in place/Call bell, personal items and telephone in reach/Instruct patient to call for assistance before getting out of bed/chair/stretcher/Non-slip footwear applied when patient is off stretcher/[place] to call system/Physically safe environment - no spills, clutter or unnecessary equipment/Purposeful proactive rounding/Room/bathroom lighting operational, light cord in reach

## 2023-09-21 NOTE — ED PROVIDER NOTE - CARE PROVIDER_API CALL
Ariane Tee  Internal Medicine  [address] [place] [id number]  Phone: [phone]  Fax: [phone]  Follow Up Time: 1-3 Days

## 2023-09-21 NOTE — ED ADULT NURSE NOTE - OBJECTIVE STATEMENT
ruq pain few days. denies n/v/d. denies fever or chills. pt aaox3 skin warm and dry no resp distress lungs clear and equal b/l ascultation abd soft non tender + bs  pt with bruise to right lateral rib cage x 2 pt unsure how. 2 small brownish areas of discoloration no pain with palpation to area no crepitus to ribs. no sob

## 2023-09-24 LAB
CULTURE RESULTS: SIGNIFICANT CHANGE UP
SPECIMEN SOURCE: SIGNIFICANT CHANGE UP

## 2023-10-08 ENCOUNTER — RX RENEWAL (OUTPATIENT)
Age: 73
End: 2023-10-08

## 2023-12-14 ENCOUNTER — LABORATORY RESULT (OUTPATIENT)
Age: 73
End: 2023-12-14

## 2023-12-15 LAB
ALBUMIN SERPL ELPH-MCNC: 3.9 G/DL
ALP BLD-CCNC: 33 U/L
ALT SERPL-CCNC: 13 U/L
ANION GAP SERPL CALC-SCNC: 16 MMOL/L
AST SERPL-CCNC: 15 U/L
BASOPHILS # BLD AUTO: 0.06 K/UL
BASOPHILS NFR BLD AUTO: 0.5 %
BILIRUB SERPL-MCNC: 0.6 MG/DL
BUN SERPL-MCNC: 28 MG/DL
CALCIUM SERPL-MCNC: 9.4 MG/DL
CHLORIDE SERPL-SCNC: 103 MMOL/L
CHOLEST SERPL-MCNC: 223 MG/DL
CO2 SERPL-SCNC: 22 MMOL/L
CREAT SERPL-MCNC: 1.16 MG/DL
EGFR: 66 ML/MIN/1.73M2
EOSINOPHIL # BLD AUTO: 0.14 K/UL
EOSINOPHIL NFR BLD AUTO: 1.2 %
ESTIMATED AVERAGE GLUCOSE: 131 MG/DL
GLUCOSE SERPL-MCNC: 112 MG/DL
HBA1C MFR BLD HPLC: 6.2 %
HCT VFR BLD CALC: 40.3 %
HDLC SERPL-MCNC: 63 MG/DL
HGB BLD-MCNC: 13.1 G/DL
IMM GRANULOCYTES NFR BLD AUTO: 1.2 %
LDLC SERPL CALC-MCNC: 121 MG/DL
LYMPHOCYTES # BLD AUTO: 1.42 K/UL
LYMPHOCYTES NFR BLD AUTO: 11.8 %
MAN DIFF?: NORMAL
MCHC RBC-ENTMCNC: 31.4 PG
MCHC RBC-ENTMCNC: 32.5 GM/DL
MCV RBC AUTO: 96.6 FL
MONOCYTES # BLD AUTO: 0.37 K/UL
MONOCYTES NFR BLD AUTO: 3.1 %
NEUTROPHILS # BLD AUTO: 9.87 K/UL
NEUTROPHILS NFR BLD AUTO: 82.2 %
NONHDLC SERPL-MCNC: 160 MG/DL
PLATELET # BLD AUTO: 215 K/UL
POTASSIUM SERPL-SCNC: 3.9 MMOL/L
PROT SERPL-MCNC: 5.8 G/DL
PSA SERPL-MCNC: 0.78 NG/ML
RBC # BLD: 4.17 M/UL
RBC # FLD: 13.8 %
SODIUM SERPL-SCNC: 140 MMOL/L
TRIGL SERPL-MCNC: 227 MG/DL
TSH SERPL-ACNC: 6.22 UIU/ML
WBC # FLD AUTO: 12.01 K/UL

## 2023-12-22 ENCOUNTER — RX RENEWAL (OUTPATIENT)
Age: 73
End: 2023-12-22

## 2023-12-22 RX ORDER — HYDROCORTISONE 25 MG/G
2.5 CREAM TOPICAL
Qty: 28 | Refills: 0 | Status: ACTIVE | COMMUNITY
Start: 2023-05-01 | End: 1900-01-01

## 2023-12-29 ENCOUNTER — RX RENEWAL (OUTPATIENT)
Age: 73
End: 2023-12-29

## 2024-01-03 ENCOUNTER — APPOINTMENT (OUTPATIENT)
Dept: INTERNAL MEDICINE | Facility: CLINIC | Age: 74
End: 2024-01-03
Payer: MEDICARE

## 2024-01-03 VITALS
DIASTOLIC BLOOD PRESSURE: 84 MMHG | RESPIRATION RATE: 16 BRPM | TEMPERATURE: 98.5 F | WEIGHT: 170 LBS | OXYGEN SATURATION: 98 % | HEIGHT: 62 IN | BODY MASS INDEX: 31.28 KG/M2 | HEART RATE: 122 BPM | SYSTOLIC BLOOD PRESSURE: 132 MMHG

## 2024-01-03 VITALS — HEART RATE: 108 BPM

## 2024-01-03 DIAGNOSIS — E03.9 HYPOTHYROIDISM, UNSPECIFIED: ICD-10-CM

## 2024-01-03 PROCEDURE — 99214 OFFICE O/P EST MOD 30 MIN: CPT

## 2024-01-03 PROCEDURE — G2211 COMPLEX E/M VISIT ADD ON: CPT

## 2024-01-03 NOTE — HISTORY OF PRESENT ILLNESS
[FreeTextEntry8] : cc: sinus infection  c/i 4-5 days of sinus congestion and runny nose.   h/o prostate ca and sees Dr Ashford.

## 2024-01-03 NOTE — PHYSICAL EXAM
[Regular Rhythm] : with a regular rhythm [No Edema] : there was no peripheral edema [Normal] : affect was normal and insight and judgment were intact [de-identified] : sinus tenderness [de-identified] : tachycardic

## 2024-01-03 NOTE — ASSESSMENT
[FreeTextEntry1] : sinusitis rx for doxycycline  HTN continue amlodipine, hctz, lisinopril  hyperlipidemia continue rosuvastatin  tachycardic regular rhythm  advise ekg and cardiology follow up he wants to speak to his daughter and doesn't want ekg today denies any symptoms of chest pain, palpitations

## 2024-01-24 ENCOUNTER — NON-APPOINTMENT (OUTPATIENT)
Age: 74
End: 2024-01-24

## 2024-01-24 ENCOUNTER — RX RENEWAL (OUTPATIENT)
Age: 74
End: 2024-01-24

## 2024-01-25 ENCOUNTER — APPOINTMENT (OUTPATIENT)
Dept: INTERNAL MEDICINE | Facility: CLINIC | Age: 74
End: 2024-01-25
Payer: MEDICARE

## 2024-01-25 VITALS
WEIGHT: 170 LBS | DIASTOLIC BLOOD PRESSURE: 72 MMHG | HEIGHT: 62 IN | BODY MASS INDEX: 31.28 KG/M2 | SYSTOLIC BLOOD PRESSURE: 112 MMHG | RESPIRATION RATE: 16 BRPM | OXYGEN SATURATION: 97 % | HEART RATE: 106 BPM | TEMPERATURE: 99.3 F

## 2024-01-25 DIAGNOSIS — J01.90 ACUTE SINUSITIS, UNSPECIFIED: ICD-10-CM

## 2024-01-25 DIAGNOSIS — D72.829 ELEVATED WHITE BLOOD CELL COUNT, UNSPECIFIED: ICD-10-CM

## 2024-01-25 PROCEDURE — 99214 OFFICE O/P EST MOD 30 MIN: CPT

## 2024-01-25 PROCEDURE — G2211 COMPLEX E/M VISIT ADD ON: CPT

## 2024-01-25 RX ORDER — DOXYCYCLINE HYCLATE 100 MG/1
100 CAPSULE ORAL TWICE DAILY
Qty: 14 | Refills: 0 | Status: ACTIVE | COMMUNITY
Start: 2024-01-03 | End: 1900-01-01

## 2024-01-25 RX ORDER — BENZONATATE 100 MG/1
100 CAPSULE ORAL 3 TIMES DAILY
Qty: 30 | Refills: 0 | Status: ACTIVE | COMMUNITY
Start: 2024-01-25 | End: 1900-01-01

## 2024-02-12 ENCOUNTER — RX RENEWAL (OUTPATIENT)
Age: 74
End: 2024-02-12

## 2024-02-14 ENCOUNTER — APPOINTMENT (OUTPATIENT)
Dept: COLORECTAL SURGERY | Facility: CLINIC | Age: 74
End: 2024-02-14
Payer: MEDICARE

## 2024-02-14 VITALS
OXYGEN SATURATION: 99 % | WEIGHT: 160 LBS | BODY MASS INDEX: 29.44 KG/M2 | SYSTOLIC BLOOD PRESSURE: 144 MMHG | HEART RATE: 138 BPM | DIASTOLIC BLOOD PRESSURE: 96 MMHG | RESPIRATION RATE: 15 BRPM | HEIGHT: 62 IN

## 2024-02-14 DIAGNOSIS — K64.9 UNSPECIFIED HEMORRHOIDS: ICD-10-CM

## 2024-02-14 PROCEDURE — 46221 LIGATION OF HEMORRHOID(S): CPT

## 2024-02-14 PROCEDURE — 99213 OFFICE O/P EST LOW 20 MIN: CPT | Mod: 25

## 2024-02-14 NOTE — PROCEDURE
[FreeTextEntry1] : Anoscopy  I discussed the reason for the procedure, and the risks and benefits with the patient. Risks including bleeding and discomfort were discussed. The patient understood or discussion and would like to proceed with the procedure.  After completing a digital rectal exam a well lubricated anoscope was gently inserted into the anus. Complete inspection was performed. No tenderness on insertion of the anoscope, and the procedure was tolerated well. No fissures, fistula openings, enlarged hemorrhoids or masses were identified.  Findings on anoscopy: Slightly prominent internal hemorrhoids. Banding of LL x 1 performed

## 2024-02-14 NOTE — HISTORY OF PRESENT ILLNESS
[FreeTextEntry1] : 2/14/24 - Patient presents today for evaluation after noticing blood when wiping after a bowel movement. He reports improvement this morning but decided to be evaluated. No other complaints.    8/14/23 - 73-year-old male who presents after noticing blood in the toilet bowl and on wiping this morning. No other complaints. Patient has a history of prolapsing bleeding internal hemorrhoids with prior banding in the office.

## 2024-02-14 NOTE — ASSESSMENT
[FreeTextEntry1] : 74-year-old male with at least grade I internal hemorrhoids. Findings on anoscopy: Slightly prominent internal hemorrhoids. Banding of LL x 1 performed.  Plan of care for Hemorrhoids Add daily fiber supplementation to diet, Metamucil, Benefiber, or fiber supplement of preference Daily over the counter stool softener (eg.Colace) to prevent straining Increased fluid intake, preferably water Refrain from straining or lingering (eg. reading) on the toilet Post band ligation instruction given - For worsening pain, fevers, or trouble urinating, patient advised to call office to arrange for urgent re-evaluation. Patient will follow up as needed

## 2024-02-14 NOTE — PHYSICAL EXAM
[Normal rectal exam] : exam was normal [Nonprolapsing] : a nonprolapsing (grade I) [Normal] : was normal [None] : there was no rectal abscess [Alert] : alert [Oriented to Person] : oriented to person [Oriented to Place] : oriented to place [Oriented to Time] : oriented to time [Calm] : calm [Excoriation] : no perianal excoriation [Multiple Sinus Tracts] : no perianal sinus tracts [Fistula] : no fistulas [Wart] : no warts [Pilonidal Cyst] : no pilonidal cysts [Pilonidal Sinus] : no pilonidal sinus [Tender, Swollen] : nontender, non-swollen [de-identified] : [place]: Nontender, no gross blood, slightly prominent internal hemorrhoids [de-identified] : NAD [de-identified] : Nonlabored [de-identified] : Normal rate

## 2024-03-07 ENCOUNTER — RX RENEWAL (OUTPATIENT)
Age: 74
End: 2024-03-07

## 2024-03-11 ENCOUNTER — APPOINTMENT (OUTPATIENT)
Dept: ORTHOPEDIC SURGERY | Facility: CLINIC | Age: 74
End: 2024-03-11
Payer: MEDICARE

## 2024-03-11 ENCOUNTER — APPOINTMENT (OUTPATIENT)
Dept: INTERNAL MEDICINE | Facility: CLINIC | Age: 74
End: 2024-03-11
Payer: MEDICARE

## 2024-03-11 ENCOUNTER — RX RENEWAL (OUTPATIENT)
Age: 74
End: 2024-03-11

## 2024-03-11 VITALS
RESPIRATION RATE: 16 BRPM | BODY MASS INDEX: 30.51 KG/M2 | OXYGEN SATURATION: 98 % | SYSTOLIC BLOOD PRESSURE: 104 MMHG | TEMPERATURE: 97.8 F | HEART RATE: 115 BPM | DIASTOLIC BLOOD PRESSURE: 68 MMHG | WEIGHT: 165.8 LBS | HEIGHT: 62 IN

## 2024-03-11 VITALS — HEIGHT: 62 IN | BODY MASS INDEX: 30.36 KG/M2 | WEIGHT: 165 LBS

## 2024-03-11 DIAGNOSIS — N18.30 CHRONIC KIDNEY DISEASE, STAGE 3 UNSPECIFIED: ICD-10-CM

## 2024-03-11 DIAGNOSIS — C61 MALIGNANT NEOPLASM OF PROSTATE: ICD-10-CM

## 2024-03-11 DIAGNOSIS — M25.551 PAIN IN RIGHT HIP: ICD-10-CM

## 2024-03-11 PROCEDURE — G2211 COMPLEX E/M VISIT ADD ON: CPT

## 2024-03-11 PROCEDURE — 99204 OFFICE O/P NEW MOD 45 MIN: CPT | Mod: 25

## 2024-03-11 PROCEDURE — 72110 X-RAY EXAM L-2 SPINE 4/>VWS: CPT

## 2024-03-11 PROCEDURE — 20552 NJX 1/MLT TRIGGER POINT 1/2: CPT | Mod: 59

## 2024-03-11 PROCEDURE — 20610 DRAIN/INJ JOINT/BURSA W/O US: CPT | Mod: RT

## 2024-03-11 PROCEDURE — 99214 OFFICE O/P EST MOD 30 MIN: CPT

## 2024-03-11 NOTE — END OF VISIT
[FreeTextEntry3] : "I, Júnior Kim, personally scribed the services dictated to me by Dr. Rey eLyva MD in this documentation on 03/11/2024"   "I Dr. Rey Leyva MD, personally performed the services described in this documentation on 03/11/2024 for the patient as scribed by Júnior Kim in my presence. I have reviewed and verified that all the information is accurate and true."

## 2024-03-11 NOTE — PLAN
[FreeTextEntry1] : continue medications Amlodipine HCTZ Rosuvastatin Further instructions pending lab results  Sent to orthopedist [facility]  Advised to lose weight  chronic medical conditions HTN HLD Hypothyroidism Return in 3 months

## 2024-03-11 NOTE — HISTORY OF PRESENT ILLNESS
[Lower back] : lower back [de-identified] : 03/11/2024: 74  yr old male c/o lower back pain that developed a while ago. States he had seen a couple of other doctors for this problem, was diagnosed w/ arthritis and was given CSI. Denies specific injury/trauma.   PmHx: BPH, CKD Stage 3, Hx of DVT in Rt leg/foot, HLD, HTN Hx of Lumbar VCF, Osteoporosis, All: PCN, Sulfa [] : no

## 2024-03-11 NOTE — ASSESSMENT
[FreeTextEntry1] : 74M with LBP and BL Graeter torhc bursitis PT No nsaids due to CKD TPI today.  Bilateral greater troch bursa and bilateral lumbar paraspinals . 2cc of Kenalog 10 mg/ml and 8 cc of 1% Lidocaine were split equally between the 4 injected locations. Fu 8 weeks

## 2024-03-11 NOTE — HEALTH RISK ASSESSMENT
[Never (0 pts)] : Never (0 points) [No] : In the past 12 months have you used drugs other than those required for medical reasons? No [No falls in past year] : Patient reported no falls in the past year [Little interest or pleasure doing things] : 1) Little interest or pleasure doing things [Feeling down, depressed, or hopeless] : 2) Feeling down, depressed, or hopeless [0] : 2) Feeling down, depressed, or hopeless: Not at all (0) [PHQ-2 Negative - No further assessment needed] : PHQ-2 Negative - No further assessment needed [Never] : Never [PKP6Orctr] : 0

## 2024-03-11 NOTE — HISTORY OF PRESENT ILLNESS
[FreeTextEntry1] : follow up  [de-identified] : WILFRID LAYNE is a 74 year old M who presents today for follow up for HTN, HLD and hypothyroidism. Pt has a hx of CKD. Pt reports having arthritis pain and wants to receives cortisone shots. Pt reports having some sinus congestion due to the cold weather.  History Prostate Cancer

## 2024-03-11 NOTE — PROCEDURE
[Large Joint Injection] : Large joint injection [Bilateral] : bilaterally of the [Lumbar paraspinal muscle] : lumbar paraspinal muscle [Greater Trochanteric Bursa] : greater trochanteric bursa [___ cc    1%] : Lidocaine ~Vcc of 1%  [___ cc    10mg] : Triamcinolone (Kenalog) ~Vcc of 10 mg  [Risks, benefits, alternatives discussed / Verbal consent obtained] : the risks benefits, and alternatives have been discussed, and verbal consent was obtained [Call if redness, pain or fever occur] : call if redness, pain or fever occur [] : Patient tolerated procedure well

## 2024-03-11 NOTE — PHYSICAL EXAM
[No Acute Distress] : no acute distress [Well Nourished] : well nourished [Well Developed] : well developed [Well-Appearing] : well-appearing [Normal Voice/Communication] : normal voice/communication [Normal Sclera/Conjunctiva] : normal sclera/conjunctiva [PERRL] : pupils equal round and reactive to light [EOMI] : extraocular movements intact [Normal Outer Ear/Nose] : the outer ears and nose were normal in appearance [Normal Oropharynx] : the oropharynx was normal [Normal TMs] : both tympanic membranes were normal [No JVD] : no jugular venous distention [No Lymphadenopathy] : no lymphadenopathy [Supple] : supple [Thyroid Normal, No Nodules] : the thyroid was normal and there were no nodules present [No Respiratory Distress] : no respiratory distress  [Clear to Auscultation] : lungs were clear to auscultation bilaterally [No Accessory Muscle Use] : no accessory muscle use [Normal Rate] : normal rate  [Regular Rhythm] : with a regular rhythm [Normal S1, S2] : normal S1 and S2 [No Murmur] : no murmur heard [No Carotid Bruits] : no carotid bruits [No Abdominal Bruit] : a ~M bruit was not heard ~T in the abdomen [No Varicosities] : no varicosities [Pedal Pulses Present] : the pedal pulses are present [No Edema] : there was no peripheral edema [No Extremity Clubbing/Cyanosis] : no extremity clubbing/cyanosis [No Palpable Aorta] : no palpable aorta [Soft] : abdomen soft [Non Tender] : non-tender [Non-distended] : non-distended [No Masses] : no abdominal mass palpated [No HSM] : no HSM [Normal Bowel Sounds] : normal bowel sounds [Normal Supraclavicular Nodes] : no supraclavicular lymphadenopathy [Normal Posterior Cervical Nodes] : no posterior cervical lymphadenopathy [Normal Anterior Cervical Nodes] : no anterior cervical lymphadenopathy [No CVA Tenderness] : no CVA  tenderness [No Spinal Tenderness] : no spinal tenderness [No Joint Swelling] : no joint swelling [Grossly Normal Strength/Tone] : grossly normal strength/tone [No Rash] : no rash [Coordination Grossly Intact] : coordination grossly intact [No Focal Deficits] : no focal deficits [Deep Tendon Reflexes (DTR)] : deep tendon reflexes were 2+ and symmetric [Normal Gait] : normal gait [Normal Affect] : the affect was normal [Speech Grossly Normal] : speech grossly normal [Memory Grossly Normal] : memory grossly normal [Normal Mood] : the mood was normal [Alert and Oriented x3] : oriented to person, place, and time [Normal Insight/Judgement] : insight and judgment were intact

## 2024-03-11 NOTE — COUNSELING
[Potential consequences of obesity discussed] : Potential consequences of obesity discussed [Benefits of weight loss discussed] : Benefits of weight loss discussed [Decrease Portions] : decrease portions [Encouraged to increase physical activity] : Encouraged to increase physical activity [Good understanding] : Patient has a good understanding of disease, goals and obesity follow-up plan

## 2024-03-12 ENCOUNTER — LABORATORY RESULT (OUTPATIENT)
Age: 74
End: 2024-03-12

## 2024-03-12 ENCOUNTER — TRANSCRIPTION ENCOUNTER (OUTPATIENT)
Age: 74
End: 2024-03-12

## 2024-03-13 ENCOUNTER — EMERGENCY (EMERGENCY)
Facility: HOSPITAL | Age: 74
LOS: 1 days | Discharge: HOME | End: 2024-03-13
Attending: EMERGENCY MEDICINE | Admitting: EMERGENCY MEDICINE
Payer: MEDICARE

## 2024-03-13 VITALS
DIASTOLIC BLOOD PRESSURE: 68 MMHG | HEART RATE: 98 BPM | SYSTOLIC BLOOD PRESSURE: 122 MMHG | OXYGEN SATURATION: 97 % | RESPIRATION RATE: 18 BRPM | TEMPERATURE: 98 F

## 2024-03-13 VITALS
HEART RATE: 114 BPM | HEIGHT: 66 IN | TEMPERATURE: 98 F | RESPIRATION RATE: 18 BRPM | SYSTOLIC BLOOD PRESSURE: 106 MMHG | WEIGHT: 160.06 LBS | DIASTOLIC BLOOD PRESSURE: 70 MMHG | OXYGEN SATURATION: 97 %

## 2024-03-13 DIAGNOSIS — K40.90 UNILATERAL INGUINAL HERNIA, WITHOUT OBSTRUCTION OR GANGRENE, NOT SPECIFIED AS RECURRENT: Chronic | ICD-10-CM

## 2024-03-13 DIAGNOSIS — Z98.890 OTHER SPECIFIED POSTPROCEDURAL STATES: Chronic | ICD-10-CM

## 2024-03-13 LAB
25(OH)D3 SERPL-MCNC: >240 NG/ML
ALBUMIN SERPL ELPH-MCNC: 3.6 G/DL — SIGNIFICANT CHANGE UP (ref 3.3–5)
ALBUMIN SERPL ELPH-MCNC: 4.3 G/DL
ALP BLD-CCNC: 30 U/L
ALP SERPL-CCNC: 29 U/L — LOW (ref 40–120)
ALT FLD-CCNC: 23 U/L — SIGNIFICANT CHANGE UP (ref 12–78)
ALT SERPL-CCNC: 16 U/L
ANION GAP SERPL CALC-SCNC: 13 MMOL/L — SIGNIFICANT CHANGE UP (ref 5–17)
ANION GAP SERPL CALC-SCNC: 18 MMOL/L
ANION GAP SERPL CALC-SCNC: 9 MMOL/L — SIGNIFICANT CHANGE UP (ref 5–17)
APPEARANCE UR: CLEAR — SIGNIFICANT CHANGE UP
APTT BLD: 26.3 SEC — SIGNIFICANT CHANGE UP (ref 24.5–35.6)
AST SERPL-CCNC: 18 U/L
AST SERPL-CCNC: 19 U/L — SIGNIFICANT CHANGE UP (ref 15–37)
BASOPHILS # BLD AUTO: 0.05 K/UL — SIGNIFICANT CHANGE UP (ref 0–0.2)
BASOPHILS # BLD AUTO: 0.09 K/UL
BASOPHILS NFR BLD AUTO: 0.5 % — SIGNIFICANT CHANGE UP (ref 0–2)
BASOPHILS NFR BLD AUTO: 0.7 %
BILIRUB SERPL-MCNC: 0.7 MG/DL
BILIRUB SERPL-MCNC: 0.8 MG/DL — SIGNIFICANT CHANGE UP (ref 0.2–1.2)
BILIRUB UR-MCNC: NEGATIVE — SIGNIFICANT CHANGE UP
BUN SERPL-MCNC: 52 MG/DL — HIGH (ref 7–23)
BUN SERPL-MCNC: 57 MG/DL — HIGH (ref 7–23)
BUN SERPL-MCNC: 58 MG/DL
CALCIUM SERPL-MCNC: 10.3 MG/DL — HIGH (ref 8.5–10.1)
CALCIUM SERPL-MCNC: 12.2 MG/DL — HIGH (ref 8.5–10.1)
CALCIUM SERPL-MCNC: 12.5 MG/DL
CALCIUM SERPL-MCNC: 12.6 MG/DL
CHLORIDE SERPL-SCNC: 103 MMOL/L
CHLORIDE SERPL-SCNC: 106 MMOL/L — SIGNIFICANT CHANGE UP (ref 96–108)
CHLORIDE SERPL-SCNC: 113 MMOL/L — HIGH (ref 96–108)
CHOLEST SERPL-MCNC: 211 MG/DL
CK SERPL-CCNC: 79 U/L
CO2 SERPL-SCNC: 22 MMOL/L
CO2 SERPL-SCNC: 25 MMOL/L — SIGNIFICANT CHANGE UP (ref 22–31)
CO2 SERPL-SCNC: 25 MMOL/L — SIGNIFICANT CHANGE UP (ref 22–31)
COLOR SPEC: YELLOW — SIGNIFICANT CHANGE UP
CREAT SERPL-MCNC: 1.8 MG/DL — HIGH (ref 0.5–1.3)
CREAT SERPL-MCNC: 1.92 MG/DL
CREAT SERPL-MCNC: 2.1 MG/DL — HIGH (ref 0.5–1.3)
DIFF PNL FLD: NEGATIVE — SIGNIFICANT CHANGE UP
EGFR: 32 ML/MIN/1.73M2 — LOW
EGFR: 36 ML/MIN/1.73M2
EGFR: 39 ML/MIN/1.73M2 — LOW
EOSINOPHIL # BLD AUTO: 0.04 K/UL — SIGNIFICANT CHANGE UP (ref 0–0.5)
EOSINOPHIL # BLD AUTO: 0.17 K/UL
EOSINOPHIL NFR BLD AUTO: 0.4 % — SIGNIFICANT CHANGE UP (ref 0–6)
EOSINOPHIL NFR BLD AUTO: 1.3 %
EPI CELLS # UR: PRESENT
ESTIMATED AVERAGE GLUCOSE: 140 MG/DL
GLUCOSE SERPL-MCNC: 107 MG/DL
GLUCOSE SERPL-MCNC: 129 MG/DL — HIGH (ref 70–99)
GLUCOSE SERPL-MCNC: 95 MG/DL — SIGNIFICANT CHANGE UP (ref 70–99)
GLUCOSE UR QL: NEGATIVE MG/DL — SIGNIFICANT CHANGE UP
HBA1C MFR BLD HPLC: 6.5 %
HCT VFR BLD CALC: 38.1 % — LOW (ref 39–50)
HCT VFR BLD CALC: 41.7 %
HDLC SERPL-MCNC: 58 MG/DL
HGB BLD-MCNC: 12.7 G/DL — LOW (ref 13–17)
HGB BLD-MCNC: 13.5 G/DL
IMM GRANULOCYTES NFR BLD AUTO: 1 % — HIGH (ref 0–0.9)
IMM GRANULOCYTES NFR BLD AUTO: 1.4 %
INR BLD: 0.92 RATIO — SIGNIFICANT CHANGE UP (ref 0.85–1.18)
KETONES UR-MCNC: NEGATIVE MG/DL — SIGNIFICANT CHANGE UP
LDLC SERPL CALC-MCNC: 119 MG/DL
LEUKOCYTE ESTERASE UR-ACNC: ABNORMAL
LYMPHOCYTES # BLD AUTO: 1.88 K/UL — SIGNIFICANT CHANGE UP (ref 1–3.3)
LYMPHOCYTES # BLD AUTO: 19.1 % — SIGNIFICANT CHANGE UP (ref 13–44)
LYMPHOCYTES # BLD AUTO: 3.37 K/UL
LYMPHOCYTES NFR BLD AUTO: 25.5 %
MAN DIFF?: NORMAL
MCHC RBC-ENTMCNC: 31.4 PG
MCHC RBC-ENTMCNC: 31.8 PG — SIGNIFICANT CHANGE UP (ref 27–34)
MCHC RBC-ENTMCNC: 32.4 GM/DL
MCHC RBC-ENTMCNC: 33.3 GM/DL — SIGNIFICANT CHANGE UP (ref 32–36)
MCV RBC AUTO: 95.5 FL — SIGNIFICANT CHANGE UP (ref 80–100)
MCV RBC AUTO: 97 FL
MONOCYTES # BLD AUTO: 0.82 K/UL — SIGNIFICANT CHANGE UP (ref 0–0.9)
MONOCYTES # BLD AUTO: 1.04 K/UL
MONOCYTES NFR BLD AUTO: 7.9 %
MONOCYTES NFR BLD AUTO: 8.3 % — SIGNIFICANT CHANGE UP (ref 2–14)
NEUTROPHILS # BLD AUTO: 6.96 K/UL — SIGNIFICANT CHANGE UP (ref 1.8–7.4)
NEUTROPHILS # BLD AUTO: 8.34 K/UL
NEUTROPHILS NFR BLD AUTO: 63.2 %
NEUTROPHILS NFR BLD AUTO: 70.7 % — SIGNIFICANT CHANGE UP (ref 43–77)
NITRITE UR-MCNC: NEGATIVE — SIGNIFICANT CHANGE UP
NONHDLC SERPL-MCNC: 153 MG/DL
NRBC # BLD: 0 /100 WBCS — SIGNIFICANT CHANGE UP (ref 0–0)
PARATHYROID HORMONE INTACT: 6 PG/ML
PH UR: 6 — SIGNIFICANT CHANGE UP (ref 5–8)
PLATELET # BLD AUTO: 248 K/UL — SIGNIFICANT CHANGE UP (ref 150–400)
PLATELET # BLD AUTO: 257 K/UL
POTASSIUM SERPL-MCNC: 3.1 MMOL/L — LOW (ref 3.5–5.3)
POTASSIUM SERPL-MCNC: 3.2 MMOL/L — LOW (ref 3.5–5.3)
POTASSIUM SERPL-SCNC: 3.1 MMOL/L — LOW (ref 3.5–5.3)
POTASSIUM SERPL-SCNC: 3.2 MMOL/L — LOW (ref 3.5–5.3)
POTASSIUM SERPL-SCNC: 3.8 MMOL/L
PROT SERPL-MCNC: 6.4 G/DL
PROT SERPL-MCNC: 6.7 G/DL — SIGNIFICANT CHANGE UP (ref 6–8.3)
PROT UR-MCNC: SIGNIFICANT CHANGE UP MG/DL
PROTHROM AB SERPL-ACNC: 10.8 SEC — SIGNIFICANT CHANGE UP (ref 9.5–13)
PSA SERPL-MCNC: 0.66 NG/ML
RBC # BLD: 3.99 M/UL — LOW (ref 4.2–5.8)
RBC # BLD: 4.3 M/UL
RBC # FLD: 13.8 % — SIGNIFICANT CHANGE UP (ref 10.3–14.5)
RBC # FLD: 14.2 %
RBC CASTS # UR COMP ASSIST: 1 /HPF — SIGNIFICANT CHANGE UP (ref 0–4)
SODIUM SERPL-SCNC: 143 MMOL/L
SODIUM SERPL-SCNC: 144 MMOL/L — SIGNIFICANT CHANGE UP (ref 135–145)
SODIUM SERPL-SCNC: 147 MMOL/L — HIGH (ref 135–145)
SP GR SPEC: 1.02 — SIGNIFICANT CHANGE UP (ref 1–1.03)
TRIGL SERPL-MCNC: 198 MG/DL
TSH SERPL-ACNC: 5.6 UIU/ML
UROBILINOGEN FLD QL: 0.2 MG/DL — SIGNIFICANT CHANGE UP (ref 0.2–1)
WBC # BLD: 9.85 K/UL — SIGNIFICANT CHANGE UP (ref 3.8–10.5)
WBC # FLD AUTO: 13.2 K/UL
WBC # FLD AUTO: 9.85 K/UL — SIGNIFICANT CHANGE UP (ref 3.8–10.5)
WBC UR QL: 1 /HPF — SIGNIFICANT CHANGE UP (ref 0–5)

## 2024-03-13 PROCEDURE — 36415 COLL VENOUS BLD VENIPUNCTURE: CPT

## 2024-03-13 PROCEDURE — 80053 COMPREHEN METABOLIC PANEL: CPT

## 2024-03-13 PROCEDURE — 81001 URINALYSIS AUTO W/SCOPE: CPT

## 2024-03-13 PROCEDURE — 85730 THROMBOPLASTIN TIME PARTIAL: CPT

## 2024-03-13 PROCEDURE — 93010 ELECTROCARDIOGRAM REPORT: CPT

## 2024-03-13 PROCEDURE — 99285 EMERGENCY DEPT VISIT HI MDM: CPT

## 2024-03-13 PROCEDURE — 85610 PROTHROMBIN TIME: CPT

## 2024-03-13 PROCEDURE — 99283 EMERGENCY DEPT VISIT LOW MDM: CPT | Mod: 25

## 2024-03-13 PROCEDURE — 85025 COMPLETE CBC W/AUTO DIFF WBC: CPT

## 2024-03-13 PROCEDURE — 93005 ELECTROCARDIOGRAM TRACING: CPT

## 2024-03-13 PROCEDURE — 80048 BASIC METABOLIC PNL TOTAL CA: CPT

## 2024-03-13 RX ORDER — POTASSIUM CHLORIDE 20 MEQ
40 PACKET (EA) ORAL ONCE
Refills: 0 | Status: COMPLETED | OUTPATIENT
Start: 2024-03-13 | End: 2024-03-13

## 2024-03-13 RX ORDER — SODIUM CHLORIDE 9 MG/ML
1000 INJECTION INTRAMUSCULAR; INTRAVENOUS; SUBCUTANEOUS ONCE
Refills: 0 | Status: COMPLETED | OUTPATIENT
Start: 2024-03-13 | End: 2024-03-13

## 2024-03-13 RX ADMIN — SODIUM CHLORIDE 1000 MILLILITER(S): 9 INJECTION INTRAMUSCULAR; INTRAVENOUS; SUBCUTANEOUS at 10:28

## 2024-03-13 RX ADMIN — SODIUM CHLORIDE 1000 MILLILITER(S): 9 INJECTION INTRAMUSCULAR; INTRAVENOUS; SUBCUTANEOUS at 09:11

## 2024-03-13 RX ADMIN — Medication 40 MILLIEQUIVALENT(S): at 11:59

## 2024-03-13 NOTE — ED PROVIDER NOTE - PATIENT PORTAL LINK FT
You can access the FollowMyHealth Patient Portal offered by [facility] by registering at the following website: [email or web address]. By joining [employer]’s FollowMyHealth portal, you will also be able to view your health information using other applications (apps) compatible with our system.

## 2024-03-13 NOTE — ED PROVIDER NOTE - CPE EDP NEURO NORM
Problem: Goal Outcome Summary  Goal: Goal Outcome Summary  Outcome: No Change  Remains on LUCIA CPAP +6; FiO2 26-34%. Occassional desaturations and one self-resolved heart rate drop with desaturation. Tolerating q 2hr feeds with no emesis. Voiding; large stool. Continue to monitor and notify provider with concerns       Verbal - The patient responds to verbal stimuli by opening their eyes when someone speaks to them. The patient is not fully oriented to time, place, or person. normal...

## 2024-03-13 NOTE — ED PROVIDER NOTE - OBJECTIVE STATEMENT
75yo male sent in by pmd after having blood drawn 2 days ago and was called today that his kidney function is abnormal, pt states he has some abd pain but denies urinary complaitns, no vomiting or diarrhea, no other complaints

## 2024-03-13 NOTE — ED ADULT NURSE NOTE - OBJECTIVE STATEMENT
patient ambulatory from triage with c/o abdnormal lab patient ambulatory from triage with c/o abdnormal labs sent by Dr Leyva for elevated BUN and creatinine. denies pain/discomort. denies CP/SOB. skin warm and dry, speaking in full sentences.

## 2024-03-13 NOTE — ED PROVIDER NOTE - PROGRESS NOTE DETAILS
repeat bmp is improved, results reviewed with dr snyder, pt to be d/c home, will follow up in the office for repeat bmp

## 2024-03-13 NOTE — ED ADULT NURSE NOTE - NSFALLUNIVINTERV_ED_ALL_ED
Bed/Stretcher in lowest position, wheels locked, appropriate side rails in place/Call bell, personal items and telephone in reach/Instruct patient to call for assistance before getting out of bed/chair/stretcher/Non-slip footwear applied when patient is off stretcher/[place] to call system/Physically safe environment - no spills, clutter or unnecessary equipment/Purposeful proactive rounding/Room/bathroom lighting operational, light cord in reach

## 2024-03-18 ENCOUNTER — LABORATORY RESULT (OUTPATIENT)
Age: 74
End: 2024-03-18

## 2024-03-18 ENCOUNTER — APPOINTMENT (OUTPATIENT)
Dept: INTERNAL MEDICINE | Facility: CLINIC | Age: 74
End: 2024-03-18
Payer: MEDICARE

## 2024-03-18 VITALS
TEMPERATURE: 98.3 F | BODY MASS INDEX: 29.44 KG/M2 | OXYGEN SATURATION: 98 % | DIASTOLIC BLOOD PRESSURE: 64 MMHG | RESPIRATION RATE: 16 BRPM | HEART RATE: 101 BPM | WEIGHT: 160 LBS | HEIGHT: 62 IN | SYSTOLIC BLOOD PRESSURE: 102 MMHG

## 2024-03-18 VITALS — HEART RATE: 92 BPM

## 2024-03-18 DIAGNOSIS — E78.5 HYPERLIPIDEMIA, UNSPECIFIED: ICD-10-CM

## 2024-03-18 DIAGNOSIS — E83.52 HYPERCALCEMIA: ICD-10-CM

## 2024-03-18 DIAGNOSIS — N18.30 CHRONIC KIDNEY DISEASE, STAGE 3 UNSPECIFIED: ICD-10-CM

## 2024-03-18 DIAGNOSIS — I10 ESSENTIAL (PRIMARY) HYPERTENSION: ICD-10-CM

## 2024-03-18 PROCEDURE — 99214 OFFICE O/P EST MOD 30 MIN: CPT

## 2024-03-18 PROCEDURE — G2211 COMPLEX E/M VISIT ADD ON: CPT

## 2024-03-18 RX ORDER — HYDROCHLOROTHIAZIDE 12.5 MG/1
12.5 TABLET ORAL
Qty: 90 | Refills: 0 | Status: DISCONTINUED | COMMUNITY
Start: 2019-10-07 | End: 2024-03-18

## 2024-03-18 NOTE — END OF VISIT
[FreeTextEntry3] : "I, Chrissy Echevarria, personally scribed the services dictated to me by Dr. Rey Leyva MD in this documentation on 03/18/2024 "   "I Dr. Rey Leyva MD, personally performed the services described in this documentation on 03/18/2024 for the patient as scribed by Chrissy Echevarria in my presence. I have reviewed and verified that all the information is accurate and true."

## 2024-03-18 NOTE — PLAN
[FreeTextEntry1] : continue medications including Amlodipine and Rosuvastatin Increase Rosuvastatin to 10 MG  Further instructions pending lab results.  chronic medical conditions HTN HLD Hypothyroidism CKD Advised to lose weight.  Continue drinking more fluids.

## 2024-03-19 LAB
25(OH)D3 SERPL-MCNC: >240 NG/ML
ALBUMIN SERPL ELPH-MCNC: 4.1 G/DL
ALP BLD-CCNC: 29 U/L
ALT SERPL-CCNC: 18 U/L
ANION GAP SERPL CALC-SCNC: 15 MMOL/L
AST SERPL-CCNC: 16 U/L
BASOPHILS # BLD AUTO: 0.05 K/UL
BASOPHILS NFR BLD AUTO: 0.4 %
BILIRUB SERPL-MCNC: 0.7 MG/DL
BUN SERPL-MCNC: 41 MG/DL
CALCIUM SERPL-MCNC: 10.7 MG/DL
CHLORIDE SERPL-SCNC: 105 MMOL/L
CO2 SERPL-SCNC: 21 MMOL/L
CREAT SERPL-MCNC: 1.46 MG/DL
EGFR: 50 ML/MIN/1.73M2
EOSINOPHIL # BLD AUTO: 0.03 K/UL
EOSINOPHIL NFR BLD AUTO: 0.2 %
GLUCOSE SERPL-MCNC: 87 MG/DL
HCT VFR BLD CALC: 37.3 %
HGB BLD-MCNC: 12.4 G/DL
IMM GRANULOCYTES NFR BLD AUTO: 1.1 %
LYMPHOCYTES # BLD AUTO: 1.41 K/UL
LYMPHOCYTES NFR BLD AUTO: 10.5 %
MAN DIFF?: NORMAL
MCHC RBC-ENTMCNC: 31.9 PG
MCHC RBC-ENTMCNC: 33.2 GM/DL
MCV RBC AUTO: 95.9 FL
MONOCYTES # BLD AUTO: 0.58 K/UL
MONOCYTES NFR BLD AUTO: 4.3 %
NEUTROPHILS # BLD AUTO: 11.2 K/UL
NEUTROPHILS NFR BLD AUTO: 83.5 %
PLATELET # BLD AUTO: 218 K/UL
POTASSIUM SERPL-SCNC: 4 MMOL/L
PROT SERPL-MCNC: 6.1 G/DL
RBC # BLD: 3.89 M/UL
RBC # FLD: 14 %
SODIUM SERPL-SCNC: 141 MMOL/L
TSH SERPL-ACNC: 4.94 UIU/ML
WBC # FLD AUTO: 13.42 K/UL

## 2024-03-20 LAB — 24R-OH-CALCIDIOL SERPL-MCNC: 194 PG/ML

## 2024-03-29 ENCOUNTER — APPOINTMENT (OUTPATIENT)
Dept: INTERNAL MEDICINE | Facility: CLINIC | Age: 74
End: 2024-03-29

## 2024-04-01 ENCOUNTER — APPOINTMENT (OUTPATIENT)
Dept: INTERNAL MEDICINE | Facility: CLINIC | Age: 74
End: 2024-04-01

## 2024-04-15 RX ORDER — LEVOCETIRIZINE DIHYDROCHLORIDE 5 MG/1
5 TABLET ORAL DAILY
Qty: 90 | Refills: 0 | Status: ACTIVE | COMMUNITY
Start: 2018-01-29 | End: 1900-01-01

## 2024-04-15 RX ORDER — AMLODIPINE BESYLATE 5 MG/1
5 TABLET ORAL DAILY
Qty: 90 | Refills: 0 | Status: ACTIVE | COMMUNITY
Start: 2019-10-07 | End: 1900-01-01

## 2024-04-15 RX ORDER — LISINOPRIL 40 MG/1
40 TABLET ORAL
Qty: 90 | Refills: 0 | Status: ACTIVE | COMMUNITY
Start: 2017-03-15 | End: 1900-01-01

## 2024-04-29 ENCOUNTER — APPOINTMENT (OUTPATIENT)
Dept: ORTHOPEDIC SURGERY | Facility: CLINIC | Age: 74
End: 2024-04-29
Payer: MEDICARE

## 2024-04-29 PROCEDURE — 99213 OFFICE O/P EST LOW 20 MIN: CPT

## 2024-04-29 NOTE — HISTORY OF PRESENT ILLNESS
[Lower back] : lower back [de-identified] : 4/29/24 Pain imrpoved with CSI but then recurring slightly.   03/11/2024: 74  yr old male c/o lower back pain that developed a while ago. States he had seen a couple of other doctors for this problem, was diagnosed w/ arthritis and was given CSI. Denies specific injury/trauma.   PmHx: BPH, CKD Stage 3, Hx of DVT in Rt leg/foot, HLD, HTN Hx of Lumbar VCF, Osteoporosis, All: PCN, Sulfa [] : no

## 2024-04-29 NOTE — ASSESSMENT
[FreeTextEntry1] : 74M with LBP and BL Graeter troch bursitis PT No nsaids due to CKD No TPI soon after last injection FU 6 weeks can repeat CSI then if he would like Discussed MRI and possible pain management but patient not interested at this time.

## 2024-05-02 ENCOUNTER — APPOINTMENT (OUTPATIENT)
Dept: ORTHOPEDIC SURGERY | Facility: CLINIC | Age: 74
End: 2024-05-02

## 2024-05-06 ENCOUNTER — APPOINTMENT (OUTPATIENT)
Dept: ORTHOPEDIC SURGERY | Facility: CLINIC | Age: 74
End: 2024-05-06

## 2024-05-13 NOTE — HEALTH RISK ASSESSMENT
Subjective:  Chloe Ruiz is a 23 year old  at 30w6d who presents for decreased fetal movement, abdominal tightness, vaginal pressure, and hip pain.    Chloe has not felt fetal movement since yesterday, but is feeling regular movement in triage.  She reports an increase in overall pains over the last 3 days.  Her main complaint is hip pain and vaginal pressure.  This is worse with movement and activity, but is pretty consistently painful that it makes it hard to sleep at night.  She never remembers having this much pain with her previous pregnancies.  She denies urinary burning, frequency, urgency. Denies vaginal odor, itching or reason to think she has an infection.  Denies vaginal bleeding, LOF, and contractions.      Her current pregnancy is significant for:  -Anemia  -Abnormal O'Rosas with passing 3 hour      Patient Active Problem List    Diagnosis Date Noted    Abnormal glucose in pregnancy, antepartum (CMD) 2024     Priority: Low     Elevated DMS:  3 HR normal      Anemia during pregnancy in third trimester (CMD) 2024     Priority: Low     [ x] Start daily iron if Hgb < 11  [ ] Workup if Hgb < 10:   [ ] Repeat CBC in 4 weeks   [ ] Iron and total iron binding capacity    [ ] Ferritin levels   [ ] Hemoglobin electrophoresis if not     [ ] If mixed picture (not only microcytic anemia-MCV <80), check folate and B12 to evaluate for causes of macrocytic anemia   [ ] Refer to hematology if not iron deficiency anemia   [ ] Start IV iron infusions (order IV venofer 200 mg weekly x 3 doses in outpatient infusion center) if iron deficiency confirmed and still < 10 after oral iron       Supervision of other normal pregnancy, antepartum (CMD) 2023     Priority: Low     [X ] Viability ultrasound  [X ] Prenatal labs  [ X] Flu vaccine  [ ] Genetic screening  [ ] AFP in 2nd trimester if NIPS performed  [ ] Anatomy US  [ ] Discuss prenatal classes/choosing peds  [ ] 28 wk labs  [ ] Rhogam  [Never] : Never [0] : 2) Feeling down, depressed, or hopeless: Not at all (0) if indicated  [ ] Tdap  [ ] Discuss breastfeeding/breast pump  [ ] GBS  [ ] Confirm presentation          OB History    Para Term  AB Living   3 2 2     2   SAB IAB Ectopic Molar Multiple Live Births           0 2      # Outcome Date GA Lbr Christos/2nd Weight Sex Delivery Anes PTL Lv   3 Current            2 Term 18 40w2d  3274 g M Vag-Spont IV analgesic N MICHAEL   1 Term 10/09/16 38w0d  2693 g M Vag-Spont IV analgesic N MICHAEL      Birth Comments: Spontaneous labor after unremarkable pregnancy.       History reviewed. No pertinent past medical history.  History reviewed. No pertinent surgical history.  Social History     Tobacco Use    Smoking status: Never    Smokeless tobacco: Never   Substance Use Topics    Alcohol use: No       Sexually Active: Yes             Partners with: Male       Comment:       Drug Use:    No              Review of patient's social economics indicates:  No social economics status on file    History reviewed. No pertinent family history.    ALLERGIES:  Patient has no known allergies.    No current facility-administered medications for this encounter.     Current Outpatient Medications   Medication Sig Dispense Refill    Prenatal Vit-DSS-Fe Fum-FA (Se-Ankur 19) 29-1 MG tablet Take 1 tablet by mouth daily. 90 tablet 3       Pertinent items are noted in HPI.    Objective:  Temp:  [98.4 °F (36.9 °C)] 98.4 °F (36.9 °C)  Heart Rate:  [97] 97  Resp:  [15] 15  BP: (123)/(69) 123/69    Physical Exam:  General: AAO x 3  Lungs: no increased respiratory effort  Abdomen: gravid soft  non tender  Extremities: LE edema none  Skin: warm, dry, intact    Presentation:breech, confirmed with informal bedside US        Fetal Heart Rate/ Movement:   Baseline: 130 bpm  Variability: Moderate 6 - 25 BPM  Periodic changes: Accelerations present  Interpretation: Category: 1  Positive fetal movement marked by patient    Uterine Activity/ Exam:  Mode: [place]  Contraction frequency (min):  Irritability       Assessment:   Chloe Ruiz is a 23 year old  at 30w6d who presents for or decreased fetal movement, abdominal tightness, vaginal pressure, and hip pain.  -Anemia  -Abnormal O'Rosas with passing 3 hour    -FHT category 1 with positive fetal movement  -No signs of labor  -Normal discomforts of pregnancy    Plan:   -Prenatal record reviewed  -EFM demonstrating reassuring fetal status  -Reassurance provided and comfort measures reviewed.  Chloe has previously tried a maternity support belt but felt like the baby didn't like it, recommended kinesiotaping instead  -Reviewed when to call or return to L&D including regular strong contractions, vaginal bleeding, leaking fluid, and decreased fetal movement.   Kick counts reinforced  -Continue home meds  -DC home, follow up as scheduled for next KANDY Gil CNM     [PHQ-2 Negative - No further assessment needed] : PHQ-2 Negative - No further assessment needed

## 2024-06-06 ENCOUNTER — APPOINTMENT (OUTPATIENT)
Dept: ORTHOPEDIC SURGERY | Facility: CLINIC | Age: 74
End: 2024-06-06
Payer: MEDICARE

## 2024-06-06 ENCOUNTER — RX RENEWAL (OUTPATIENT)
Age: 74
End: 2024-06-06

## 2024-06-06 DIAGNOSIS — M54.9 DORSALGIA, UNSPECIFIED: ICD-10-CM

## 2024-06-06 PROCEDURE — 99213 OFFICE O/P EST LOW 20 MIN: CPT | Mod: 25

## 2024-06-06 PROCEDURE — 20610 DRAIN/INJ JOINT/BURSA W/O US: CPT | Mod: 50

## 2024-06-06 RX ORDER — ROSUVASTATIN CALCIUM 10 MG/1
10 TABLET, FILM COATED ORAL
Qty: 30 | Refills: 0 | Status: ACTIVE | COMMUNITY
Start: 2021-07-07 | End: 1900-01-01

## 2024-06-06 RX ORDER — AZELASTINE HYDROCHLORIDE 137 UG/1
0.1 SPRAY, METERED NASAL
Qty: 30 | Refills: 0 | Status: ACTIVE | COMMUNITY
Start: 2022-09-07 | End: 1900-01-01

## 2024-06-06 RX ORDER — AZELASTINE HYDROCHLORIDE 137 UG/1
137 SPRAY, METERED NASAL
Qty: 30 | Refills: 0 | Status: ACTIVE | COMMUNITY
Start: 2022-03-14 | End: 1900-01-01

## 2024-06-06 NOTE — PROCEDURE
[Large Joint Injection] : Large joint injection [Bilateral] : bilaterally of the [Greater Trochanteric Bursa] : greater trochanteric bursa [Lumbar paraspinal muscle] : lumbar paraspinal muscle [___ cc    1%] : Lidocaine ~Vcc of 1%  [___ cc    10mg] : Triamcinolone (Kenalog) ~Vcc of 10 mg  [] : Patient tolerated procedure well [Call if redness, pain or fever occur] : call if redness, pain or fever occur [Risks, benefits, alternatives discussed / Verbal consent obtained] : the risks benefits, and alternatives have been discussed, and verbal consent was obtained

## 2024-06-06 NOTE — ASSESSMENT
[FreeTextEntry1] : 74M with LBP and BL Graeter troch bursitis  No nsaids due to CKD TPI MRI L spine

## 2024-06-06 NOTE — HISTORY OF PRESENT ILLNESS
[Lower back] : lower back [de-identified] : 4/29/24 Pain imrpoved with CSI but then recurring slightly.   03/11/2024: 74  yr old male c/o lower back pain that developed a while ago. States he had seen a couple of other doctors for this problem, was diagnosed w/ arthritis and was given CSI. Denies specific injury/trauma.   PmHx: BPH, CKD Stage 3, Hx of DVT in Rt leg/foot, HLD, HTN Hx of Lumbar VCF, Osteoporosis, All: PCN, Sulfa [] : no

## 2024-06-11 ENCOUNTER — APPOINTMENT (OUTPATIENT)
Dept: MRI IMAGING | Facility: CLINIC | Age: 74
End: 2024-06-11
Payer: MEDICARE

## 2024-06-11 PROCEDURE — 72148 MRI LUMBAR SPINE W/O DYE: CPT | Mod: MH

## 2024-06-18 ENCOUNTER — APPOINTMENT (OUTPATIENT)
Dept: INTERNAL MEDICINE | Facility: CLINIC | Age: 74
End: 2024-06-18
Payer: MEDICARE

## 2024-06-18 VITALS
HEART RATE: 102 BPM | SYSTOLIC BLOOD PRESSURE: 124 MMHG | DIASTOLIC BLOOD PRESSURE: 70 MMHG | OXYGEN SATURATION: 97 % | HEIGHT: 62 IN | BODY MASS INDEX: 29.44 KG/M2 | RESPIRATION RATE: 16 BRPM | WEIGHT: 160 LBS | TEMPERATURE: 97.3 F

## 2024-06-18 PROCEDURE — 99214 OFFICE O/P EST MOD 30 MIN: CPT

## 2024-06-18 RX ORDER — MUPIROCIN 20 MG/G
2 OINTMENT TOPICAL 3 TIMES DAILY
Qty: 2 | Refills: 2 | Status: ACTIVE | COMMUNITY
Start: 2024-06-18 | End: 1900-01-01

## 2024-06-18 RX ORDER — HYDROCORTISONE 25 MG/G
2.5 OINTMENT TOPICAL TWICE DAILY
Qty: 20 | Refills: 2 | Status: ACTIVE | COMMUNITY
Start: 2024-02-14 | End: 1900-01-01

## 2024-06-18 NOTE — ASSESSMENT
[FreeTextEntry1] : Pt reports sulfa allergy; will avoid silvadene Start Mupirocin ointment BID See Dr. Higginbotham, Dermatology

## 2024-06-18 NOTE — PHYSICAL EXAM
[No Acute Distress] : no acute distress [Well Nourished] : well nourished [Well Developed] : well developed [Well-Appearing] : well-appearing [Normal Sclera/Conjunctiva] : normal sclera/conjunctiva [PERRL] : pupils equal round and reactive to light [EOMI] : extraocular movements intact [Normal Outer Ear/Nose] : the outer ears and nose were normal in appearance [Normal Oropharynx] : the oropharynx was normal [No JVD] : no jugular venous distention [No Lymphadenopathy] : no lymphadenopathy [Supple] : supple [Thyroid Normal, No Nodules] : the thyroid was normal and there were no nodules present [No Respiratory Distress] : no respiratory distress  [No Accessory Muscle Use] : no accessory muscle use [Clear to Auscultation] : lungs were clear to auscultation bilaterally [Normal Rate] : normal rate  [Regular Rhythm] : with a regular rhythm [Normal S1, S2] : normal S1 and S2 [No Murmur] : no murmur heard [No Carotid Bruits] : no carotid bruits [No Abdominal Bruit] : a ~M bruit was not heard ~T in the abdomen [No Varicosities] : no varicosities [Pedal Pulses Present] : the pedal pulses are present [No Edema] : there was no peripheral edema [No Palpable Aorta] : no palpable aorta [No Extremity Clubbing/Cyanosis] : no extremity clubbing/cyanosis [Soft] : abdomen soft [Non Tender] : non-tender [Non-distended] : non-distended [No Masses] : no abdominal mass palpated [No HSM] : no HSM [Normal Bowel Sounds] : normal bowel sounds [Normal Posterior Cervical Nodes] : no posterior cervical lymphadenopathy [Normal Anterior Cervical Nodes] : no anterior cervical lymphadenopathy [No CVA Tenderness] : no CVA  tenderness [No Spinal Tenderness] : no spinal tenderness [No Joint Swelling] : no joint swelling [Grossly Normal Strength/Tone] : grossly normal strength/tone [No Rash] : no rash [Coordination Grossly Intact] : coordination grossly intact [No Focal Deficits] : no focal deficits [Normal Gait] : normal gait [Deep Tendon Reflexes (DTR)] : deep tendon reflexes were 2+ and symmetric [Normal Affect] : the affect was normal [Normal Insight/Judgement] : insight and judgment were intact [de-identified] : Mild burn on right hand and right forearm

## 2024-06-18 NOTE — HISTORY OF PRESENT ILLNESS
[FreeTextEntry8] : Pt has a mild burn on his right hand and right forearm that he suffered 3 days ago from hot water while  cooking spaghetti.

## 2024-06-20 ENCOUNTER — APPOINTMENT (OUTPATIENT)
Dept: DERMATOLOGY | Facility: CLINIC | Age: 74
End: 2024-06-20
Payer: MEDICARE

## 2024-06-20 DIAGNOSIS — L98.9 DISORDER OF THE SKIN AND SUBCUTANEOUS TISSUE, UNSPECIFIED: ICD-10-CM

## 2024-06-20 DIAGNOSIS — T30.0 BURN OF UNSPECIFIED BODY REGION, UNSPECIFIED DEGREE: ICD-10-CM

## 2024-06-20 PROCEDURE — 99204 OFFICE O/P NEW MOD 45 MIN: CPT

## 2024-06-24 ENCOUNTER — APPOINTMENT (OUTPATIENT)
Dept: ORTHOPEDIC SURGERY | Facility: CLINIC | Age: 74
End: 2024-06-24
Payer: MEDICARE

## 2024-06-24 VITALS — BODY MASS INDEX: 29.44 KG/M2 | WEIGHT: 160 LBS | HEIGHT: 62 IN

## 2024-06-24 DIAGNOSIS — S32.000A WEDGE COMPRESSION FRACTURE OF UNSPECIFIED LUMBAR VERTEBRA, INITIAL ENCOUNTER FOR CLOSED FRACTURE: ICD-10-CM

## 2024-06-24 DIAGNOSIS — D64.9 ANEMIA, UNSPECIFIED: ICD-10-CM

## 2024-06-24 PROCEDURE — 99213 OFFICE O/P EST LOW 20 MIN: CPT

## 2024-06-25 LAB
25(OH)D3 SERPL-MCNC: 193 NG/ML
ALBUMIN SERPL ELPH-MCNC: 4 G/DL
ALBUMIN SERPL ELPH-MCNC: 4.1 G/DL
ALP BLD-CCNC: 38 U/L
ALP BLD-CCNC: 38 U/L
ALT SERPL-CCNC: 17 U/L
ALT SERPL-CCNC: 18 U/L
ANION GAP SERPL CALC-SCNC: 12 MMOL/L
ANION GAP SERPL CALC-SCNC: 17 MMOL/L
AST SERPL-CCNC: 13 U/L
AST SERPL-CCNC: 17 U/L
BASOPHILS # BLD AUTO: 0.04 K/UL
BASOPHILS # BLD AUTO: 0.11 K/UL
BASOPHILS NFR BLD AUTO: 0.3 %
BASOPHILS NFR BLD AUTO: 0.8 %
BILIRUB SERPL-MCNC: 0.4 MG/DL
BILIRUB SERPL-MCNC: 0.4 MG/DL
BUN SERPL-MCNC: 38 MG/DL
BUN SERPL-MCNC: 38 MG/DL
CALCIUM SERPL-MCNC: 9.1 MG/DL
CALCIUM SERPL-MCNC: 9.2 MG/DL
CHLORIDE SERPL-SCNC: 107 MMOL/L
CHLORIDE SERPL-SCNC: 107 MMOL/L
CO2 SERPL-SCNC: 18 MMOL/L
CO2 SERPL-SCNC: 22 MMOL/L
CREAT SERPL-MCNC: 1.09 MG/DL
CREAT SERPL-MCNC: 1.1 MG/DL
EGFR: 70 ML/MIN/1.73M2
EGFR: 70 ML/MIN/1.73M2
EOSINOPHIL # BLD AUTO: 0.06 K/UL
EOSINOPHIL # BLD AUTO: 0.07 K/UL
EOSINOPHIL NFR BLD AUTO: 0.4 %
EOSINOPHIL NFR BLD AUTO: 0.5 %
FERRITIN SERPL-MCNC: 36 NG/ML
GLUCOSE SERPL-MCNC: 92 MG/DL
GLUCOSE SERPL-MCNC: 93 MG/DL
HCT VFR BLD CALC: 44 %
HCT VFR BLD CALC: 46.2 %
HGB BLD-MCNC: 14.1 G/DL
HGB BLD-MCNC: 14.3 G/DL
IMM GRANULOCYTES NFR BLD AUTO: 2.2 %
IMM GRANULOCYTES NFR BLD AUTO: 2.6 %
IRON SATN MFR SERPL: 26 %
IRON SERPL-MCNC: 89 UG/DL
LYMPHOCYTES # BLD AUTO: 1.67 K/UL
LYMPHOCYTES # BLD AUTO: 1.69 K/UL
LYMPHOCYTES NFR BLD AUTO: 11 %
LYMPHOCYTES NFR BLD AUTO: 11.6 %
MAN DIFF?: NORMAL
MAN DIFF?: NORMAL
MCHC RBC-ENTMCNC: 30.5 GM/DL
MCHC RBC-ENTMCNC: 30.7 PG
MCHC RBC-ENTMCNC: 31.3 PG
MCHC RBC-ENTMCNC: 32.5 GM/DL
MCV RBC AUTO: 100.4 FL
MCV RBC AUTO: 96.3 FL
MONOCYTES # BLD AUTO: 1.05 K/UL
MONOCYTES # BLD AUTO: 1.08 K/UL
MONOCYTES NFR BLD AUTO: 6.9 %
MONOCYTES NFR BLD AUTO: 7.4 %
NEUTROPHILS # BLD AUTO: 11.25 K/UL
NEUTROPHILS # BLD AUTO: 11.96 K/UL
NEUTROPHILS NFR BLD AUTO: 77.5 %
NEUTROPHILS NFR BLD AUTO: 78.8 %
PLATELET # BLD AUTO: 209 K/UL
PLATELET # BLD AUTO: 221 K/UL
POTASSIUM SERPL-SCNC: 4.3 MMOL/L
POTASSIUM SERPL-SCNC: 4.3 MMOL/L
PROT SERPL-MCNC: 6 G/DL
PROT SERPL-MCNC: 6 G/DL
PSA SERPL-MCNC: 0.76 NG/ML
RBC # BLD: 4.57 M/UL
RBC # BLD: 4.6 M/UL
RBC # FLD: 13.5 %
RBC # FLD: 13.8 %
SODIUM SERPL-SCNC: 141 MMOL/L
SODIUM SERPL-SCNC: 142 MMOL/L
TIBC SERPL-MCNC: 345 UG/DL
UIBC SERPL-MCNC: 256 UG/DL
WBC # FLD AUTO: 14.52 K/UL
WBC # FLD AUTO: 15.17 K/UL

## 2024-07-08 ENCOUNTER — APPOINTMENT (OUTPATIENT)
Dept: INTERNAL MEDICINE | Facility: CLINIC | Age: 74
End: 2024-07-08
Payer: MEDICARE

## 2024-07-08 ENCOUNTER — APPOINTMENT (OUTPATIENT)
Dept: DERMATOLOGY | Facility: CLINIC | Age: 74
End: 2024-07-08
Payer: MEDICARE

## 2024-07-08 VITALS
SYSTOLIC BLOOD PRESSURE: 128 MMHG | RESPIRATION RATE: 16 BRPM | BODY MASS INDEX: 29.44 KG/M2 | TEMPERATURE: 98.3 F | HEART RATE: 74 BPM | OXYGEN SATURATION: 96 % | DIASTOLIC BLOOD PRESSURE: 76 MMHG | WEIGHT: 160 LBS | HEIGHT: 62 IN

## 2024-07-08 DIAGNOSIS — L98.9 DISORDER OF THE SKIN AND SUBCUTANEOUS TISSUE, UNSPECIFIED: ICD-10-CM

## 2024-07-08 DIAGNOSIS — T30.0 BURN OF UNSPECIFIED BODY REGION, UNSPECIFIED DEGREE: ICD-10-CM

## 2024-07-08 PROCEDURE — 99213 OFFICE O/P EST LOW 20 MIN: CPT

## 2024-07-12 NOTE — BRIEF OPERATIVE NOTE - NSICDXBRIEFPROCEDURE_GEN_ALL_CORE_FT
Gloria PROCEDURES:  Pudendal nerve block 09-Nov-2020 21:40:58  Rachel Solis  Placement, seton stitch, prone position 09-Nov-2020 21:40:51  Rachel Solis  Incision and drainage of deep rectal abscess 09-Nov-2020 21:40:32  Rachel Solis

## 2024-08-05 ENCOUNTER — APPOINTMENT (OUTPATIENT)
Dept: PAIN MANAGEMENT | Facility: CLINIC | Age: 74
End: 2024-08-05

## 2024-08-05 PROCEDURE — 99214 OFFICE O/P EST MOD 30 MIN: CPT

## 2024-08-05 NOTE — ASSESSMENT
[FreeTextEntry1] : After discussing various treatment options with the patient including but not limited to oral medications, physical therapy, exercise, modalities as well as interventional spinal injections, we have decided with the following plan:   1) Intervention Injection Therapy: I personally reviewed the MRI/CT scan images and agree with the radiologist's report. The radiological findings were discussed with the patient. The risks, benefits, contents and alternatives to injection were explained in full to the patient. Risks outlined include but are not limited to infection, sepsis, bleeding, post-dural puncture headache, nerve damage, temporary increase in pain, syncopal episode, failure to resolve symptoms, allergic reaction, symptom recurrence, and elevation of blood sugar in diabetics. Cortisone may cause immunosuppression. Patient understands the risks. All questions were answered. After discussion of options, patient requested an injection. Information regarding the injection was given to the patient. Which medications to stop prior to the injection was explained to the patient as well.   Follow up in 1-2 weeks post injection for re-evaluation. Continue Home exercises, stretching, activity modification, physical therapy, and conservative care.   Patient is presenting with acute/sub-acute radicular pain with impairment in ADLs and functionality.  The pain has not responded sufficiently to  conservative care including nsaid therapy and/or physical therapy.  There is no bleeding tendency, unstable medical condition, or systemic infection. The purpose of the spinal injections is to facilitate active therapy by providing short term relief through reduction of pain and inflammation.   Injections, by themselves, are not likely to provide long-term relief. Rather, active rehabilitation with modified work achieves long-term relief by increasing active ROM, strength and stability.   LESI L4/5   2)continue PT.

## 2024-08-05 NOTE — PHYSICAL EXAM
[] : no lumbar paraspinal tenderness [NL (90)] : forward flexion 90 degrees [3___] : left dorsiflexors 3[unfilled]/5 [de-identified] : extension 10 degrees

## 2024-08-05 NOTE — PHYSICAL EXAM
[] : no lumbar paraspinal tenderness [NL (90)] : forward flexion 90 degrees [3___] : left dorsiflexors 3[unfilled]/5 [de-identified] : extension 10 degrees

## 2024-08-05 NOTE — HISTORY OF PRESENT ILLNESS
[Lower back] : lower back [5] : 5 [Dull/Aching] : dull/aching [Intermittent] : intermittent [Leisure] : leisure [Sleep] : sleep [Nothing helps with pain getting better] : Nothing helps with pain getting better [Sitting] : sitting [Walking] : walking [FreeTextEntry1] : 08/05/2024 : Patient presents for initial evaluation. He c/o of having pain on his lower back. Pain radiates to the b/l hips. no n/t.   PmHx: BPH, CKD Stage 3, Hx of DVT in Rt leg/foot, HLD, HTN Hx of Lumbar VCF, Osteoporosis, All: PCN, Sulfa  Subjective Weakness: Yes Numbness/Tingling: No Bladder/Bowel dysfunction: No Treatments Tried: Cortisone shot, physical therapy  Attempted modalities for current pain complaint: See above: Medications: Yes: Tylenol   Injections: No   Previous Spine Surgery: No  Imaging: MRI Lumbar Spine (6/11/24)   severe height loss of L1, acute process. approx. 3mm retropulsion at the T12-L1 and L1-2. mild central stenosis T12-L1. bulge at L1/2 wtih 11x6mm periarticular cyst, moderate central stenosis and moderate b/l foraminal narrowing. bulge at L3/4 with mild NF narrowing. bulge at L4/5 with mild central stenosis and moderate b/l NF narrowing.  bulge at L5/S1 with mild NF narrowing. multifocal facet arthropathy.  [] : Post Surgical Visit: no [de-identified] : OC L MRI

## 2024-08-05 NOTE — HISTORY OF PRESENT ILLNESS
[Lower back] : lower back [5] : 5 [Dull/Aching] : dull/aching [Intermittent] : intermittent [Leisure] : leisure [Sleep] : sleep [Nothing helps with pain getting better] : Nothing helps with pain getting better [Sitting] : sitting [Walking] : walking [FreeTextEntry1] : 08/05/2024 : Patient presents for initial evaluation. He c/o of having pain on his lower back. Pain radiates to the b/l hips. no n/t.   PmHx: BPH, CKD Stage 3, Hx of DVT in Rt leg/foot, HLD, HTN Hx of Lumbar VCF, Osteoporosis, All: PCN, Sulfa  Subjective Weakness: Yes Numbness/Tingling: No Bladder/Bowel dysfunction: No Treatments Tried: Cortisone shot, physical therapy  Attempted modalities for current pain complaint: See above: Medications: Yes: Tylenol   Injections: No   Previous Spine Surgery: No  Imaging: MRI Lumbar Spine (6/11/24)   severe height loss of L1, acute process. approx. 3mm retropulsion at the T12-L1 and L1-2. mild central stenosis T12-L1. bulge at L1/2 wtih 11x6mm periarticular cyst, moderate central stenosis and moderate b/l foraminal narrowing. bulge at L3/4 with mild NF narrowing. bulge at L4/5 with mild central stenosis and moderate b/l NF narrowing.  bulge at L5/S1 with mild NF narrowing. multifocal facet arthropathy.  [] : Post Surgical Visit: no [de-identified] : OC L MRI

## 2024-08-06 ENCOUNTER — RX RENEWAL (OUTPATIENT)
Age: 74
End: 2024-08-06

## 2024-08-07 ENCOUNTER — RX RENEWAL (OUTPATIENT)
Age: 74
End: 2024-08-07

## 2024-08-09 ENCOUNTER — RX RENEWAL (OUTPATIENT)
Age: 74
End: 2024-08-09

## 2024-08-13 ENCOUNTER — APPOINTMENT (OUTPATIENT)
Dept: PAIN MANAGEMENT | Facility: CLINIC | Age: 74
End: 2024-08-13
Payer: MEDICARE

## 2024-08-13 DIAGNOSIS — S32.000A WEDGE COMPRESSION FRACTURE OF UNSPECIFIED LUMBAR VERTEBRA, INITIAL ENCOUNTER FOR CLOSED FRACTURE: ICD-10-CM

## 2024-08-13 DIAGNOSIS — M54.16 RADICULOPATHY, LUMBAR REGION: ICD-10-CM

## 2024-08-13 PROCEDURE — 62323 NJX INTERLAMINAR LMBR/SAC: CPT

## 2024-08-13 NOTE — PROCEDURE
[FreeTextEntry3] : Date of Service: 08/13/2024   Account: [id number]   Patient: WILFRID LAYNE   Date of Birth: Zechariah 10 1950   Age: 74 year   Surgeon: Jenny Ramos M.D.   Pre-Operative Diagnosis:  Lumbosacral Radiculitis (M54.17)   Post Operative Diagnosis: Lumbosacral Radiculitis (M54.17)   Procedure: Interlaminar lumbar epidural steroid injection (L4-5) under fluoroscopic guidance   Anesthesia:           None     This procedure was carried out using fluoroscopic guidance.  The risks and benefits of the procedure were discussed extensively with the patient.  The consent of the patient was obtained and the following procedure was performed.   The patient was placed in the prone position.  The lumbar area was prepped and draped in a sterile fashion.  Under AP view with slight cephalad-caudad angulation, the L4-5 interspace was identified and marked.  Using sterile technique the superficial skin was anesthetized with 1% Lidocaine without epinephrine.  A 20 gauge Tuohoy needle was advanced into the epidural space under fluoroscopy using [id number] technique and using loss of resistance at the L4-5 level.  After negative aspiration for heme or CSF, an epidurogram was obtained using 3 cc Omnipaque contrast confirming epidural placement of the needle.   Lumbar epidurogram showed no intrathecal or intravascular spread and showed adequate bilateral epidural spread from L1 to S1 levels.   After this, 5 cc of preservative free normal saline and 80 mg of Kenalog were injected into the epidural space.   The needle was subsequently removed.     The patient tolerated the procedure well and was instructed to contact me immediately if there were any problems.   Jenny Ramos M.D.

## 2024-08-15 ENCOUNTER — APPOINTMENT (OUTPATIENT)
Dept: INTERNAL MEDICINE | Facility: CLINIC | Age: 74
End: 2024-08-15
Payer: MEDICARE

## 2024-08-15 VITALS
OXYGEN SATURATION: 97 % | DIASTOLIC BLOOD PRESSURE: 84 MMHG | RESPIRATION RATE: 16 BRPM | SYSTOLIC BLOOD PRESSURE: 142 MMHG | TEMPERATURE: 98.2 F | BODY MASS INDEX: 29.44 KG/M2 | HEIGHT: 62 IN | WEIGHT: 160 LBS | HEART RATE: 99 BPM

## 2024-08-15 DIAGNOSIS — R07.81 PLEURODYNIA: ICD-10-CM

## 2024-08-15 PROCEDURE — 99213 OFFICE O/P EST LOW 20 MIN: CPT

## 2024-08-15 RX ORDER — CYCLOBENZAPRINE HYDROCHLORIDE 5 MG/1
5 TABLET, FILM COATED ORAL
Qty: 10 | Refills: 0 | Status: ACTIVE | COMMUNITY
Start: 2024-08-15 | End: 1900-01-01

## 2024-08-15 NOTE — HISTORY OF PRESENT ILLNESS
[FreeTextEntry8] : 74 yr old male here for an acute visit. He reports he was lifting and moving around some furniture 4 days ago and is now experiencing a dull pain over his right side of the chest. He denies any cough, shortness of breath. He took Tylenol with mild relief. He recently saw pain management for lumbar compression fracture and is s/p LESI. He feels better post injection.  He has a rash over his right hand for which he saw dermatology and feels like it is improving.

## 2024-08-15 NOTE — PHYSICAL EXAM
[No Acute Distress] : no acute distress [Well Nourished] : well nourished [Well Developed] : well developed [Normal Sclera/Conjunctiva] : normal sclera/conjunctiva [Normal Oropharynx] : the oropharynx was normal [No JVD] : no jugular venous distention [No Lymphadenopathy] : no lymphadenopathy [Supple] : supple [No Respiratory Distress] : no respiratory distress  [No Accessory Muscle Use] : no accessory muscle use [Clear to Auscultation] : lungs were clear to auscultation bilaterally [Normal Rate] : normal rate  [Regular Rhythm] : with a regular rhythm [No Edema] : there was no peripheral edema [Soft] : abdomen soft [Non Tender] : non-tender [Non-distended] : non-distended [Normal Bowel Sounds] : normal bowel sounds [Normal Posterior Cervical Nodes] : no posterior cervical lymphadenopathy [Normal Anterior Cervical Nodes] : no anterior cervical lymphadenopathy [No Joint Swelling] : no joint swelling [Grossly Normal Strength/Tone] : grossly normal strength/tone [No Focal Deficits] : no focal deficits [Normal Gait] : normal gait [Normal Affect] : the affect was normal [Normal Insight/Judgement] : insight and judgment were intact [de-identified] : right chest wall skin intact. reproducible tenderness to palpation [de-identified] : rash over right hand, healing

## 2024-08-15 NOTE — PLAN
[FreeTextEntry1] : Given risk for fractures, advised CXR to rule out bony pathology will defer NSAIDS in this elderly pain Trial of Flexeril for pain, at bedtime. Prn Tylenol Follow up in 1 week if symptoms not improved or clinically worse.

## 2024-08-19 ENCOUNTER — NON-APPOINTMENT (OUTPATIENT)
Age: 74
End: 2024-08-19

## 2024-08-20 ENCOUNTER — NON-APPOINTMENT (OUTPATIENT)
Age: 74
End: 2024-08-20

## 2024-08-21 ENCOUNTER — RX RENEWAL (OUTPATIENT)
Age: 74
End: 2024-08-21

## 2024-09-03 ENCOUNTER — APPOINTMENT (OUTPATIENT)
Dept: PAIN MANAGEMENT | Facility: CLINIC | Age: 74
End: 2024-09-03
Payer: MEDICARE

## 2024-09-03 VITALS — BODY MASS INDEX: 29.44 KG/M2 | HEIGHT: 62 IN | WEIGHT: 160 LBS

## 2024-09-03 VITALS — HEIGHT: 62 IN | WEIGHT: 164 LBS | BODY MASS INDEX: 30.18 KG/M2

## 2024-09-03 DIAGNOSIS — M54.16 RADICULOPATHY, LUMBAR REGION: ICD-10-CM

## 2024-09-03 PROBLEM — M47.816 LUMBAR SPONDYLOSIS: Status: ACTIVE | Noted: 2024-09-03

## 2024-09-03 PROCEDURE — 99214 OFFICE O/P EST MOD 30 MIN: CPT

## 2024-09-03 NOTE — HISTORY OF PRESENT ILLNESS
[Lower back] : lower back [5] : 5 [Dull/Aching] : dull/aching [Intermittent] : intermittent [Leisure] : leisure [Sleep] : sleep [Nothing helps with pain getting better] : Nothing helps with pain getting better [Sitting] : sitting [Walking] : walking [] : Post Surgical Visit: no [FreeTextEntry1] : b/l hips  [de-identified] : OC L MRI

## 2024-09-03 NOTE — ASSESSMENT

## 2024-09-03 NOTE — PHYSICAL EXAM
[NL (90)] : forward flexion 90 degrees [3___] : left dorsiflexors 3[unfilled]/5 [Extension] : extension [] : positive Taylor maneuver facet loading [de-identified] : extension 10 degrees

## 2024-09-03 NOTE — HISTORY OF PRESENT ILLNESS
[Lower back] : lower back [5] : 5 [Dull/Aching] : dull/aching [Intermittent] : intermittent [Leisure] : leisure [Sleep] : sleep [Nothing helps with pain getting better] : Nothing helps with pain getting better [Sitting] : sitting [Walking] : walking [] : Post Surgical Visit: no [FreeTextEntry1] : b/l hips  [de-identified] : OC L MRI

## 2024-09-03 NOTE — PHYSICAL EXAM
[NL (90)] : forward flexion 90 degrees [3___] : left dorsiflexors 3[unfilled]/5 [Extension] : extension [] : positive Taylor maneuver facet loading [de-identified] : extension 10 degrees

## 2024-09-10 ENCOUNTER — APPOINTMENT (OUTPATIENT)
Dept: PAIN MANAGEMENT | Facility: CLINIC | Age: 74
End: 2024-09-10
Payer: MEDICARE

## 2024-09-10 DIAGNOSIS — M47.816 SPONDYLOSIS W/OUT MYELOPATHY OR RADICULOPATHY, LUMBAR REGION: ICD-10-CM

## 2024-09-10 PROCEDURE — 64493 INJ PARAVERT F JNT L/S 1 LEV: CPT | Mod: 59,RT

## 2024-09-10 PROCEDURE — 64494 INJ PARAVERT F JNT L/S 2 LEV: CPT | Mod: 59,RT

## 2024-09-10 NOTE — PROCEDURE
[FreeTextEntry3] : Date of Service: 09/10/2024   Account: [id number]   Patient: WILFRID LAYNE   Date of Birth: Zechariah 10 1950   Age: 74 year     Surgeon:                                      Jenny Ramos M.D.   Assistant:                                    None.   Pre-Operative Diagnosis:            Spondylosis of Lumbar Region without Myelopathy or Radiculopathy (M47.816)      Post Operative Diagnosis:        Same    Procedure:                 Right L4-5, L5-S1 Facet block                                       Left L4-5, L5-S1 Facet block under fluoroscopic guidance    Anesthesia:                None     This procedure was carried out using fluoroscopic guidance.  The risks and benefits of the procedure were discussed extensively with the patient.  The consent of the patient was obtained and the following procedure was performed.   The patient was placed in the prone position.  The patient's back was prepped and draped in a sterile fashion.  The left L4 and L5 lumbar vertebral bodies were identified and the fluoroscope left obliqued to approximately 30 degrees to reveal good "Matt-dog" anatomical view.  The junction of the superior articulate process and tranverse process at the L4 and L5 level was then identified and marked. The skin at these target points was then localized using 1 cc of 1% Lidocaine without epinephrine at each injection site.  A spinal needle was then introduced and advanced to the above target points at the junction of the SAP and transverse processes until oss was contacted.  After negative aspiration for heme and CSF, an injectate of 1cc 0.25% marcaine and 10mg of methylprednisolone acetate was injected at each of the two levels.   The right L4 and L5 lumbar vertebral bodies were identified and the fluoroscope right obliqued to approximately 30 degrees to reveal good "Matt-dog" anatomical view.  The junction of the superior articulate process and tranverse process at the L4 and L5 level was then identified and marked. The skin at these target points was then localized using 1 cc of 1% Lidocaine without epinephrine at each injection site.  A spinal needle was then introduced and advanced to the above target points at the junction of the SAP and transverse processes until oss was contacted.  After negative aspiration for heme and CSF, an injectate of 1cc 0.25% marcaine and 10mg of methylprednisolone acetate was injected at each of the two levels.   Fluoroscope then focused on the bilateral sacral ala on AP view, and marked at these points.  The skin and subcutaneous structures were localized using 1cc of 1.0 % lidocaine without epinephrine.  A spinal needle was then advanced under fluoroscopic guidance until oss was contacted at the ala bilaterally.  After negative aspiration for heme and CSF, an injectate of 1cc 0.25% marcaine and 10mg of methylprednisolone acetate was injected at each site.   The needles were then removed and pressure was applied.  Vitals were monitored throughout.    Jenny Ramos M.D.

## 2024-09-17 ENCOUNTER — NON-APPOINTMENT (OUTPATIENT)
Age: 74
End: 2024-09-17

## 2024-09-23 ENCOUNTER — APPOINTMENT (OUTPATIENT)
Dept: PAIN MANAGEMENT | Facility: CLINIC | Age: 74
End: 2024-09-23

## 2024-09-23 NOTE — PHYSICAL EXAM
[NL (90)] : forward flexion 90 degrees [Extension] : extension [3___] : left dorsiflexors 3[unfilled]/5 [] : no lumbar paraspinal tenderness [de-identified] : extension 10 degrees

## 2024-09-23 NOTE — HISTORY OF PRESENT ILLNESS
[Lower back] : lower back [5] : 5 [Dull/Aching] : dull/aching [Intermittent] : intermittent [Leisure] : leisure [Sleep] : sleep [Nothing helps with pain getting better] : Nothing helps with pain getting better [Sitting] : sitting [Walking] : walking [] : no [FreeTextEntry1] : b/l hips  [de-identified] : OC L MRI

## 2024-09-23 NOTE — HISTORY OF PRESENT ILLNESS
[Lower back] : lower back [5] : 5 [Dull/Aching] : dull/aching [Intermittent] : intermittent [Leisure] : leisure [Sleep] : sleep [Nothing helps with pain getting better] : Nothing helps with pain getting better [Sitting] : sitting [Walking] : walking [] : Post Surgical Visit: no [FreeTextEntry1] : b/l hips  [de-identified] : OC L MRI

## 2024-09-23 NOTE — PHYSICAL EXAM
[NL (90)] : forward flexion 90 degrees [Extension] : extension [3___] : left dorsiflexors 3[unfilled]/5 [] : no lumbar paraspinal tenderness [de-identified] : extension 10 degrees

## 2024-09-24 ENCOUNTER — RX RENEWAL (OUTPATIENT)
Age: 74
End: 2024-09-24

## 2024-09-25 ENCOUNTER — APPOINTMENT (OUTPATIENT)
Dept: ORTHOPEDIC SURGERY | Facility: CLINIC | Age: 74
End: 2024-09-25
Payer: MEDICARE

## 2024-09-25 VITALS — HEIGHT: 62 IN | WEIGHT: 160 LBS | BODY MASS INDEX: 29.44 KG/M2

## 2024-09-25 DIAGNOSIS — M70.61 TROCHANTERIC BURSITIS, RIGHT HIP: ICD-10-CM

## 2024-09-25 DIAGNOSIS — M54.9 DORSALGIA, UNSPECIFIED: ICD-10-CM

## 2024-09-25 DIAGNOSIS — M70.62 TROCHANTERIC BURSITIS, RIGHT HIP: ICD-10-CM

## 2024-09-25 PROCEDURE — 20610 DRAIN/INJ JOINT/BURSA W/O US: CPT | Mod: 50

## 2024-09-25 PROCEDURE — 72100 X-RAY EXAM L-S SPINE 2/3 VWS: CPT

## 2024-09-25 PROCEDURE — J3490M: CUSTOM | Mod: NC

## 2024-09-25 PROCEDURE — 73522 X-RAY EXAM HIPS BI 3-4 VIEWS: CPT

## 2024-09-25 PROCEDURE — 99214 OFFICE O/P EST MOD 30 MIN: CPT | Mod: 25

## 2024-09-25 NOTE — PROCEDURE
[Large Joint Injection] : Large joint injection [Bilateral] : bilaterally of the [Greater Trochanteric Bursa] : greater trochanteric bursa [Pain] : pain [Alcohol] : alcohol [Betadine] : betadine [Ethyl Chloride sprayed topically] : ethyl chloride sprayed topically [Sterile technique used] : sterile technique used [___ cc    1%] : Lidocaine ~Vcc of 1%  [___ cc    0.25%] : Bupivacaine (Marcaine) ~Vcc of 0.25%  [___ cc    10mg] : Triamcinolone (Kenalog) ~Vcc of 10 mg  [] : Patient tolerated procedure well [Call if redness, pain or fever occur] : call if redness, pain or fever occur [Previous OTC use and PT nontherapeutic] : patient has tried OTC's including aspirin, Ibuprofen, Aleve, etc or prescription NSAIDS, and/or exercises at home and/or physical therapy without satisfactory response [Patient had decreased mobility in the joint] : patient had decreased mobility in the joint [Risks, benefits, alternatives discussed / Verbal consent obtained] : the risks benefits, and alternatives have been discussed, and verbal consent was obtained

## 2024-09-29 NOTE — HISTORY OF PRESENT ILLNESS
[10] : 10 [Sharp] : sharp [Rest] : rest [Walking] : walking [de-identified] : 9/25/24:  75 yo M - New patient is here for dorinda hip pain.  Pain is to the lateral aspect of his hip; Has had pain for the last couple of weeks. Hard to walk. Looking to get cortisone shots today. Denies prior treatment. Denies any injury/trauma. B/L greater troch injections given by Dr SOHA King on 6/24/24. Has seen Dr King, Wes and Dr Ramos for his back   PmHx: BPH, CKD Stage 3, Hx of DVT in Rt leg/foot, HLD, HTN Hx of Lumbar VCF, Osteoporosis, All: PCN, Sulfa  MRi L spine - Imaging: MRI Lumbar Spine (6/11/24) severe height loss of L1, acute process. approx. 3mm retropulsion at the T12-L1 and L1-2. mild central stenosis T12-L1. bulge at L1/2 wtih 11x6mm periarticular cyst, moderate central stenosis and moderate b/l foraminal narrowing. bulge at L3/4 with mild NF narrowing. bulge at L4/5 with mild central stenosis and moderate b/l NF narrowing. bulge at L5/S1 with mild NF narrowing. multifocal facet arthropathy.  xrays reviewed today B/L hips - no visible fractures; calcification at the right greater troch   [] : no [FreeTextEntry1] : dorinda hips

## 2024-09-29 NOTE — HISTORY OF PRESENT ILLNESS
[10] : 10 [Sharp] : sharp [Rest] : rest [Walking] : walking [de-identified] : 9/25/24:  75 yo M - New patient is here for dorinda hip pain.  Pain is to the lateral aspect of his hip; Has had pain for the last couple of weeks. Hard to walk. Looking to get cortisone shots today. Denies prior treatment. Denies any injury/trauma. B/L greater troch injections given by Dr SOHA King on 6/24/24. Has seen Dr King, Wes and Dr Ramos for his back   PmHx: BPH, CKD Stage 3, Hx of DVT in Rt leg/foot, HLD, HTN Hx of Lumbar VCF, Osteoporosis, All: PCN, Sulfa  MRi L spine - Imaging: MRI Lumbar Spine (6/11/24) severe height loss of L1, acute process. approx. 3mm retropulsion at the T12-L1 and L1-2. mild central stenosis T12-L1. bulge at L1/2 wtih 11x6mm periarticular cyst, moderate central stenosis and moderate b/l foraminal narrowing. bulge at L3/4 with mild NF narrowing. bulge at L4/5 with mild central stenosis and moderate b/l NF narrowing. bulge at L5/S1 with mild NF narrowing. multifocal facet arthropathy.  xrays reviewed today B/L hips - no visible fractures; calcification at the right greater troch   [] : no [FreeTextEntry1] : dorinda hips

## 2024-09-29 NOTE — DISCUSSION/SUMMARY
[Medication Risks Reviewed] : Medication risks reviewed [de-identified] : reviewed the case and the imaging with the patient  bilateral hip pain  discussion of the condition and treatment options cautions discussed questions answered discussion of natural history of the condition and what the next step would be injection B/L greater troch injections tolerated well

## 2024-09-29 NOTE — DISCUSSION/SUMMARY
[Medication Risks Reviewed] : Medication risks reviewed [de-identified] : reviewed the case and the imaging with the patient  bilateral hip pain  discussion of the condition and treatment options cautions discussed questions answered discussion of natural history of the condition and what the next step would be injection B/L greater troch injections tolerated well

## 2024-10-07 ENCOUNTER — LABORATORY RESULT (OUTPATIENT)
Age: 74
End: 2024-10-07

## 2024-10-07 DIAGNOSIS — D64.9 ANEMIA, UNSPECIFIED: ICD-10-CM

## 2024-10-08 DIAGNOSIS — E83.52 HYPERCALCEMIA: ICD-10-CM

## 2024-10-08 LAB
ALBUMIN SERPL ELPH-MCNC: 3.8 G/DL
ALP BLD-CCNC: 54 U/L
ALT SERPL-CCNC: 15 U/L
ANION GAP SERPL CALC-SCNC: 15 MMOL/L
AST SERPL-CCNC: 14 U/L
BASOPHILS # BLD AUTO: 0.03 K/UL
BASOPHILS NFR BLD AUTO: 0.2 %
BILIRUB SERPL-MCNC: 0.5 MG/DL
BUN SERPL-MCNC: 47 MG/DL
CALCIUM SERPL-MCNC: 9.1 MG/DL
CHLORIDE SERPL-SCNC: 108 MMOL/L
CHOLEST SERPL-MCNC: 174 MG/DL
CO2 SERPL-SCNC: 19 MMOL/L
CREAT SERPL-MCNC: 1.03 MG/DL
EGFR: 75 ML/MIN/1.73M2
EOSINOPHIL # BLD AUTO: 0.06 K/UL
EOSINOPHIL NFR BLD AUTO: 0.4 %
ESTIMATED AVERAGE GLUCOSE: 134 MG/DL
FERRITIN SERPL-MCNC: 90 NG/ML
GLUCOSE SERPL-MCNC: 97 MG/DL
HBA1C MFR BLD HPLC: 6.3 %
HCT VFR BLD CALC: 41.3 %
HDLC SERPL-MCNC: 64 MG/DL
HGB BLD-MCNC: 12.8 G/DL
IMM GRANULOCYTES NFR BLD AUTO: 2.3 %
IRON SATN MFR SERPL: 19 %
IRON SERPL-MCNC: 64 UG/DL
LDLC SERPL CALC-MCNC: 88 MG/DL
LYMPHOCYTES # BLD AUTO: 1.29 K/UL
LYMPHOCYTES NFR BLD AUTO: 8.9 %
MAN DIFF?: NORMAL
MCHC RBC-ENTMCNC: 31 GM/DL
MCHC RBC-ENTMCNC: 31.2 PG
MCV RBC AUTO: 100.7 FL
MONOCYTES # BLD AUTO: 0.9 K/UL
MONOCYTES NFR BLD AUTO: 6.2 %
NEUTROPHILS # BLD AUTO: 11.87 K/UL
NEUTROPHILS NFR BLD AUTO: 82 %
NONHDLC SERPL-MCNC: 110 MG/DL
PLATELET # BLD AUTO: 195 K/UL
POTASSIUM SERPL-SCNC: 4.1 MMOL/L
PROT SERPL-MCNC: 5.9 G/DL
PSA SERPL-MCNC: 0.73 NG/ML
RBC # BLD: 4.1 M/UL
RBC # FLD: 15.9 %
SODIUM SERPL-SCNC: 142 MMOL/L
TIBC SERPL-MCNC: 338 UG/DL
TRIGL SERPL-MCNC: 122 MG/DL
TSH SERPL-ACNC: 4.41 UIU/ML
UIBC SERPL-MCNC: 274 UG/DL
WBC # FLD AUTO: 14.49 K/UL

## 2024-10-09 LAB — 25(OH)D3 SERPL-MCNC: 125 NG/ML

## 2024-10-14 ENCOUNTER — RX RENEWAL (OUTPATIENT)
Age: 74
End: 2024-10-14

## 2024-11-13 ENCOUNTER — RX RENEWAL (OUTPATIENT)
Age: 74
End: 2024-11-13

## 2024-11-13 ENCOUNTER — APPOINTMENT (OUTPATIENT)
Dept: COLORECTAL SURGERY | Facility: CLINIC | Age: 74
End: 2024-11-13
Payer: MEDICARE

## 2024-11-13 VITALS
HEART RATE: 94 BPM | OXYGEN SATURATION: 95 % | DIASTOLIC BLOOD PRESSURE: 90 MMHG | WEIGHT: 160 LBS | BODY MASS INDEX: 29.44 KG/M2 | HEIGHT: 62 IN | RESPIRATION RATE: 15 BRPM | TEMPERATURE: 98.1 F | SYSTOLIC BLOOD PRESSURE: 144 MMHG

## 2024-11-13 DIAGNOSIS — K64.8 OTHER HEMORRHOIDS: ICD-10-CM

## 2024-11-13 PROCEDURE — 99213 OFFICE O/P EST LOW 20 MIN: CPT | Mod: 25

## 2024-11-13 PROCEDURE — 46221 LIGATION OF HEMORRHOID(S): CPT

## 2024-11-13 RX ORDER — HYDROCORTISONE 2.5% 25 MG/G
2.5 CREAM TOPICAL
Qty: 1 | Refills: 1 | Status: ACTIVE | COMMUNITY
Start: 2024-11-13 | End: 1900-01-01

## 2024-11-13 NOTE — ED ADULT NURSE NOTE - TEMPLATE
11/13/2024    Marianne Shultz  [address] [facility] [address]      Dear Marianne,    There’s no question about it - preventive care can save lives. Many health problems start out silently without symptoms. Preventive care is often the only way to catch these problems in early stages, when they can be more successfully treated.     Our records show that you are due for the screening(s) listed below. If you have completed these screening(s), please call us so we can update your record.    Screening Mammogram  Mammogram: A mammogram can find cancer long before a lump can be felt on the breast. To schedule your Mammogram, please call [phone].    Your health is important to us. Please feel free to call my office at [phone] or make an appointment to discuss the best screening options for you.     Sincerely,      Ed Wakefield MD   [facility] [place] [address]  [address] [facility] [address]  Dept: [phone]  Dept Fax: [phone]     Enclosures:    Mammography  Mammography is an X-ray exam of your breast tissue. The image it makes is called a mammogram. It can help find problems with your breasts, such as cysts or cancer. Mammography is the best breast cancer screening tool available.   Be proactive   Have mammograms and breast exams as often as your healthcare provider advises. Also be sure you know how your breasts normally look and feel. This makes it easier to see any changes. Report changes to your provider as soon as you can. Finally, check your insurance to know what's covered.   Types of mammography    X-ray. Uses low dose X-ray to see breast tissue.  Digital. This uses electronics that convert X-rays into mammogram pictures of the breast. It uses less radiation.  Computer-aided detection (CAD).  These look for abnormal areas of calcium deposits, and density.  3-D mammogram. Images of the breast from different angles are taken to make a 3-D image set.    How do  I get ready for a mammogram?    Schedule the test for 1 week after your period. Your breasts are less sore and dense then.  Make sure your clinic gets images of your last mammogram if they were done somewhere else. This lets the provider compare the 2 sets of images for any changes.  On the day of your test, don’t use deodorant, powder, or perfume.  Wear a top that you can easily take off.    What happens during a mammogram?   You will need to undress from the waist up.  The technologist will position your breast to get the best results.  Each of your breasts will be compressed one at a time. This helps get the most complete image.  Your breasts will be repositioned to get at least 2 views of each breast.    What happens after a mammogram?  More X-rays or an ultrasound are sometimes needed. If not done at the time of your initial mammogram, you’ll be called to schedule them.  You should get your test results in writing. Ask about this at your appointment.  Have mammograms as often as your healthcare provider advises.    What to tell your provider   Tell your provider if:  You’re pregnant or think you could be  You have breast implants  You have any scars or moles on or near your breasts  You’ve had a breast biopsy or surgery  You’re breastfeeding  Toby last reviewed this educational content on 9/1/2021 © 2000-2022 [employer]. All rights reserved. This information is not intended as a substitute for professional medical care. Always follow your healthcare professional's instructions.        [facility] offers online access to your health information via [email or web address]        Abdominal Pain, N/V/D

## 2024-11-18 ENCOUNTER — RX RENEWAL (OUTPATIENT)
Age: 74
End: 2024-11-18

## 2024-11-21 RX ORDER — WALKER
EACH MISCELLANEOUS
Qty: 1 | Refills: 0 | Status: ACTIVE | OUTPATIENT
Start: 2024-11-19

## 2024-12-02 ENCOUNTER — APPOINTMENT (OUTPATIENT)
Dept: COLORECTAL SURGERY | Facility: CLINIC | Age: 74
End: 2024-12-02

## 2024-12-09 DIAGNOSIS — M54.9 DORSALGIA, UNSPECIFIED: ICD-10-CM

## 2025-01-02 ENCOUNTER — RX RENEWAL (OUTPATIENT)
Age: 75
End: 2025-01-02

## 2025-01-13 DIAGNOSIS — C61 MALIGNANT NEOPLASM OF PROSTATE: ICD-10-CM

## 2025-01-14 LAB
25(OH)D3 SERPL-MCNC: 129 NG/ML
ALBUMIN SERPL ELPH-MCNC: 4 G/DL
ALP BLD-CCNC: 61 U/L
ALT SERPL-CCNC: 8 U/L
ANION GAP SERPL CALC-SCNC: 14 MMOL/L
AST SERPL-CCNC: 13 U/L
BASOPHILS # BLD AUTO: 0.07 K/UL
BASOPHILS NFR BLD AUTO: 0.6 %
BILIRUB SERPL-MCNC: 0.6 MG/DL
BUN SERPL-MCNC: 20 MG/DL
CALCIUM SERPL-MCNC: 9.4 MG/DL
CHLORIDE SERPL-SCNC: 106 MMOL/L
CO2 SERPL-SCNC: 23 MMOL/L
CREAT SERPL-MCNC: 0.86 MG/DL
EGFR: 89 ML/MIN/1.73M2
EOSINOPHIL # BLD AUTO: 0.32 K/UL
EOSINOPHIL NFR BLD AUTO: 2.6 %
ESTIMATED AVERAGE GLUCOSE: 131 MG/DL
GLUCOSE SERPL-MCNC: 103 MG/DL
HBA1C MFR BLD HPLC: 6.2 %
HCT VFR BLD CALC: 42.8 %
HGB BLD-MCNC: 13.3 G/DL
IMM GRANULOCYTES NFR BLD AUTO: 1.2 %
LYMPHOCYTES # BLD AUTO: 2.66 K/UL
LYMPHOCYTES NFR BLD AUTO: 21.3 %
MAN DIFF?: NORMAL
MCHC RBC-ENTMCNC: 30.3 PG
MCHC RBC-ENTMCNC: 31.1 G/DL
MCV RBC AUTO: 97.5 FL
MONOCYTES # BLD AUTO: 1.23 K/UL
MONOCYTES NFR BLD AUTO: 9.9 %
NEUTROPHILS # BLD AUTO: 8.05 K/UL
NEUTROPHILS NFR BLD AUTO: 64.4 %
PLATELET # BLD AUTO: 226 K/UL
POTASSIUM SERPL-SCNC: 4.1 MMOL/L
PROT SERPL-MCNC: 6 G/DL
PSA SERPL-MCNC: 0.39 NG/ML
RBC # BLD: 4.39 M/UL
RBC # FLD: 13.8 %
SODIUM SERPL-SCNC: 143 MMOL/L
WBC # FLD AUTO: 12.48 K/UL

## 2025-01-30 ENCOUNTER — APPOINTMENT (OUTPATIENT)
Dept: ORTHOPEDIC SURGERY | Facility: CLINIC | Age: 75
End: 2025-01-30
Payer: MEDICARE

## 2025-01-30 DIAGNOSIS — M54.9 DORSALGIA, UNSPECIFIED: ICD-10-CM

## 2025-01-30 DIAGNOSIS — M70.61 TROCHANTERIC BURSITIS, RIGHT HIP: ICD-10-CM

## 2025-01-30 DIAGNOSIS — M70.62 TROCHANTERIC BURSITIS, RIGHT HIP: ICD-10-CM

## 2025-01-30 PROCEDURE — 20610 DRAIN/INJ JOINT/BURSA W/O US: CPT | Mod: 50

## 2025-01-30 PROCEDURE — 99213 OFFICE O/P EST LOW 20 MIN: CPT | Mod: 25

## 2025-01-31 ENCOUNTER — RX RENEWAL (OUTPATIENT)
Age: 75
End: 2025-01-31

## 2025-02-13 ENCOUNTER — APPOINTMENT (OUTPATIENT)
Dept: INTERNAL MEDICINE | Facility: CLINIC | Age: 75
End: 2025-02-13
Payer: MEDICARE

## 2025-02-13 VITALS
RESPIRATION RATE: 16 BRPM | BODY MASS INDEX: 29.44 KG/M2 | SYSTOLIC BLOOD PRESSURE: 126 MMHG | WEIGHT: 160 LBS | HEART RATE: 109 BPM | OXYGEN SATURATION: 97 % | HEIGHT: 62 IN | DIASTOLIC BLOOD PRESSURE: 84 MMHG | TEMPERATURE: 97.3 F

## 2025-02-13 DIAGNOSIS — R05.9 COUGH, UNSPECIFIED: ICD-10-CM

## 2025-02-13 DIAGNOSIS — I10 ESSENTIAL (PRIMARY) HYPERTENSION: ICD-10-CM

## 2025-02-13 DIAGNOSIS — E78.5 HYPERLIPIDEMIA, UNSPECIFIED: ICD-10-CM

## 2025-02-13 DIAGNOSIS — N18.30 CHRONIC KIDNEY DISEASE, STAGE 3 UNSPECIFIED: ICD-10-CM

## 2025-02-13 PROCEDURE — G2211 COMPLEX E/M VISIT ADD ON: CPT

## 2025-02-13 PROCEDURE — 99213 OFFICE O/P EST LOW 20 MIN: CPT

## 2025-02-18 ENCOUNTER — APPOINTMENT (OUTPATIENT)
Dept: INTERNAL MEDICINE | Facility: CLINIC | Age: 75
End: 2025-02-18
Payer: MEDICARE

## 2025-02-18 VITALS
RESPIRATION RATE: 16 BRPM | HEIGHT: 62 IN | OXYGEN SATURATION: 97 % | TEMPERATURE: 97.3 F | DIASTOLIC BLOOD PRESSURE: 84 MMHG | BODY MASS INDEX: 29.44 KG/M2 | WEIGHT: 160 LBS | HEART RATE: 97 BPM | SYSTOLIC BLOOD PRESSURE: 122 MMHG

## 2025-02-18 DIAGNOSIS — J06.9 ACUTE UPPER RESPIRATORY INFECTION, UNSPECIFIED: ICD-10-CM

## 2025-02-18 PROCEDURE — 99214 OFFICE O/P EST MOD 30 MIN: CPT

## 2025-02-18 RX ORDER — AZITHROMYCIN 250 MG/1
250 TABLET, FILM COATED ORAL
Qty: 1 | Refills: 0 | Status: ACTIVE | COMMUNITY
Start: 2025-02-18 | End: 1900-01-01

## 2025-04-03 ENCOUNTER — APPOINTMENT (OUTPATIENT)
Dept: ORTHOPEDIC SURGERY | Facility: CLINIC | Age: 75
End: 2025-04-03

## 2025-04-07 ENCOUNTER — RX RENEWAL (OUTPATIENT)
Age: 75
End: 2025-04-07

## 2025-04-18 DIAGNOSIS — N18.30 CHRONIC KIDNEY DISEASE, STAGE 3 UNSPECIFIED: ICD-10-CM

## 2025-04-18 DIAGNOSIS — D64.9 ANEMIA, UNSPECIFIED: ICD-10-CM

## 2025-04-19 LAB
25(OH)D3 SERPL-MCNC: 113 NG/ML
ALBUMIN SERPL ELPH-MCNC: 3.8 G/DL
ALP BLD-CCNC: 52 U/L
ALT SERPL-CCNC: 15 U/L
ANION GAP SERPL CALC-SCNC: 14 MMOL/L
AST SERPL-CCNC: 15 U/L
BASOPHILS # BLD AUTO: 0.08 K/UL
BASOPHILS NFR BLD AUTO: 0.6 %
BILIRUB SERPL-MCNC: 0.6 MG/DL
BUN SERPL-MCNC: 29 MG/DL
CALCIUM SERPL-MCNC: 9 MG/DL
CHLORIDE SERPL-SCNC: 107 MMOL/L
CO2 SERPL-SCNC: 22 MMOL/L
CREAT SERPL-MCNC: 0.94 MG/DL
EGFRCR SERPLBLD CKD-EPI 2021: 83 ML/MIN/1.73M2
EOSINOPHIL # BLD AUTO: 0.23 K/UL
EOSINOPHIL NFR BLD AUTO: 1.8 %
FERRITIN SERPL-MCNC: 17 NG/ML
GLUCOSE SERPL-MCNC: 71 MG/DL
HCT VFR BLD CALC: 42.6 %
HGB BLD-MCNC: 13.1 G/DL
IMM GRANULOCYTES NFR BLD AUTO: 0.8 %
IRON SATN MFR SERPL: 15 %
IRON SERPL-MCNC: 57 UG/DL
LYMPHOCYTES # BLD AUTO: 3.03 K/UL
LYMPHOCYTES NFR BLD AUTO: 24.3 %
MAN DIFF?: NORMAL
MCHC RBC-ENTMCNC: 29.5 PG
MCHC RBC-ENTMCNC: 30.8 G/DL
MCV RBC AUTO: 95.9 FL
MONOCYTES # BLD AUTO: 1.2 K/UL
MONOCYTES NFR BLD AUTO: 9.6 %
NEUTROPHILS # BLD AUTO: 7.82 K/UL
NEUTROPHILS NFR BLD AUTO: 62.9 %
PLATELET # BLD AUTO: 268 K/UL
POTASSIUM SERPL-SCNC: 4 MMOL/L
PROT SERPL-MCNC: 5.7 G/DL
PSA SERPL-MCNC: 0.32 NG/ML
RBC # BLD: 4.44 M/UL
RBC # FLD: 16.2 %
SODIUM SERPL-SCNC: 143 MMOL/L
TIBC SERPL-MCNC: 382 UG/DL
UIBC SERPL-MCNC: 325 UG/DL
WBC # FLD AUTO: 12.46 K/UL

## 2025-05-15 ENCOUNTER — APPOINTMENT (OUTPATIENT)
Dept: INTERNAL MEDICINE | Facility: CLINIC | Age: 75
End: 2025-05-15
Payer: MEDICARE

## 2025-05-15 VITALS
HEIGHT: 62 IN | BODY MASS INDEX: 29.44 KG/M2 | RESPIRATION RATE: 16 BRPM | HEART RATE: 94 BPM | WEIGHT: 160 LBS | OXYGEN SATURATION: 98 % | DIASTOLIC BLOOD PRESSURE: 80 MMHG | SYSTOLIC BLOOD PRESSURE: 132 MMHG

## 2025-05-15 DIAGNOSIS — J30.9 ALLERGIC RHINITIS, UNSPECIFIED: ICD-10-CM

## 2025-05-15 DIAGNOSIS — C61 MALIGNANT NEOPLASM OF PROSTATE: ICD-10-CM

## 2025-05-15 PROCEDURE — 99213 OFFICE O/P EST LOW 20 MIN: CPT

## 2025-05-15 PROCEDURE — G2211 COMPLEX E/M VISIT ADD ON: CPT

## 2025-05-15 RX ORDER — FLUTICASONE PROPIONATE 50 UG/1
50 SPRAY, METERED NASAL DAILY
Qty: 1 | Refills: 2 | Status: ACTIVE | COMMUNITY
Start: 2025-05-15 | End: 1900-01-01

## 2025-05-29 ENCOUNTER — APPOINTMENT (OUTPATIENT)
Dept: INTERNAL MEDICINE | Facility: CLINIC | Age: 75
End: 2025-05-29
Payer: MEDICARE

## 2025-05-29 VITALS
HEART RATE: 93 BPM | RESPIRATION RATE: 16 BRPM | WEIGHT: 160 LBS | OXYGEN SATURATION: 96 % | DIASTOLIC BLOOD PRESSURE: 64 MMHG | SYSTOLIC BLOOD PRESSURE: 122 MMHG | BODY MASS INDEX: 29.44 KG/M2 | HEIGHT: 62 IN

## 2025-05-29 DIAGNOSIS — N18.30 CHRONIC KIDNEY DISEASE, STAGE 3 UNSPECIFIED: ICD-10-CM

## 2025-05-29 DIAGNOSIS — D64.9 ANEMIA, UNSPECIFIED: ICD-10-CM

## 2025-05-29 DIAGNOSIS — I10 ESSENTIAL (PRIMARY) HYPERTENSION: ICD-10-CM

## 2025-05-29 DIAGNOSIS — D72.829 ELEVATED WHITE BLOOD CELL COUNT, UNSPECIFIED: ICD-10-CM

## 2025-05-29 DIAGNOSIS — E78.5 HYPERLIPIDEMIA, UNSPECIFIED: ICD-10-CM

## 2025-05-29 DIAGNOSIS — C61 MALIGNANT NEOPLASM OF PROSTATE: ICD-10-CM

## 2025-05-29 PROCEDURE — G0439: CPT

## 2025-07-18 ENCOUNTER — APPOINTMENT (OUTPATIENT)
Dept: COLORECTAL SURGERY | Facility: CLINIC | Age: 75
End: 2025-07-18
Payer: MEDICARE

## 2025-07-18 VITALS
HEIGHT: 62 IN | WEIGHT: 160 LBS | OXYGEN SATURATION: 98 % | DIASTOLIC BLOOD PRESSURE: 91 MMHG | SYSTOLIC BLOOD PRESSURE: 148 MMHG | TEMPERATURE: 97.2 F | RESPIRATION RATE: 16 BRPM | BODY MASS INDEX: 29.44 KG/M2 | HEART RATE: 89 BPM

## 2025-07-18 VITALS — SYSTOLIC BLOOD PRESSURE: 111 MMHG | DIASTOLIC BLOOD PRESSURE: 75 MMHG

## 2025-07-18 PROCEDURE — 46221 LIGATION OF HEMORRHOID(S): CPT

## 2025-07-18 PROCEDURE — 99214 OFFICE O/P EST MOD 30 MIN: CPT | Mod: 25

## 2025-07-18 RX ORDER — HYDROCORTISONE 25 MG/G
2.5 CREAM TOPICAL
Qty: 45 | Refills: 6 | Status: ACTIVE | COMMUNITY
Start: 2025-07-18 | End: 1900-01-01

## 2025-07-18 NOTE — ED ADULT NURSE NOTE - NS PRO PASSIVE SMOKE EXP
No Bed/Stretcher in lowest position, wheels locked, appropriate side rails in place/Call bell, personal items and telephone in reach/Instruct patient to call for assistance before getting out of bed/chair/stretcher/Non-slip footwear applied when patient is off stretcher/[place] to call system/Physically safe environment - no spills, clutter or unnecessary equipment/Purposeful proactive rounding/Room/bathroom lighting operational, light cord in reach

## 2025-07-19 LAB
25(OH)D3 SERPL-MCNC: 116 NG/ML
ALBUMIN SERPL ELPH-MCNC: 4 G/DL
ALP BLD-CCNC: 46 U/L
ALT SERPL-CCNC: 17 U/L
ANION GAP SERPL CALC-SCNC: 15 MMOL/L
AST SERPL-CCNC: 15 U/L
BASOPHILS # BLD AUTO: 0.03 K/UL
BASOPHILS NFR BLD AUTO: 0.3 %
BILIRUB SERPL-MCNC: 0.5 MG/DL
BUN SERPL-MCNC: 30 MG/DL
CALCIUM SERPL-MCNC: 9.2 MG/DL
CHLORIDE SERPL-SCNC: 103 MMOL/L
CO2 SERPL-SCNC: 22 MMOL/L
CREAT SERPL-MCNC: 1.06 MG/DL
EGFRCR SERPLBLD CKD-EPI 2021: 72 ML/MIN/1.73M2
EOSINOPHIL # BLD AUTO: 0.07 K/UL
EOSINOPHIL NFR BLD AUTO: 0.6 %
FERRITIN SERPL-MCNC: 20 NG/ML
FOLATE SERPL-MCNC: 18.1 NG/ML
GLUCOSE SERPL-MCNC: 86 MG/DL
HCT VFR BLD CALC: 41.6 %
HGB BLD-MCNC: 13.5 G/DL
IMM GRANULOCYTES NFR BLD AUTO: 0.6 %
IRON SATN MFR SERPL: 16 %
IRON SERPL-MCNC: 53 UG/DL
LYMPHOCYTES # BLD AUTO: 1.86 K/UL
LYMPHOCYTES NFR BLD AUTO: 16.3 %
MAN DIFF?: NORMAL
MCHC RBC-ENTMCNC: 29.9 PG
MCHC RBC-ENTMCNC: 32.5 G/DL
MCV RBC AUTO: 92 FL
MONOCYTES # BLD AUTO: 0.99 K/UL
MONOCYTES NFR BLD AUTO: 8.7 %
NEUTROPHILS # BLD AUTO: 8.36 K/UL
NEUTROPHILS NFR BLD AUTO: 73.5 %
PLATELET # BLD AUTO: 253 K/UL
POTASSIUM SERPL-SCNC: 3.9 MMOL/L
PROT SERPL-MCNC: 5.8 G/DL
PSA SERPL-MCNC: 0.59 NG/ML
RBC # BLD: 4.52 M/UL
RBC # FLD: 15.2 %
SODIUM SERPL-SCNC: 140 MMOL/L
TIBC SERPL-MCNC: 341 UG/DL
UIBC SERPL-MCNC: 288 UG/DL
VIT B12 SERPL-MCNC: >2000 PG/ML
WBC # FLD AUTO: 11.38 K/UL

## 2025-08-28 RX ORDER — HYDROCORTISONE ACETATE 25 MG/1
25 SUPPOSITORY RECTAL
Qty: 7 | Refills: 2 | Status: ACTIVE | COMMUNITY
Start: 2025-08-28 | End: 1900-01-01